# Patient Record
Sex: MALE | Race: WHITE | NOT HISPANIC OR LATINO | Employment: FULL TIME | URBAN - METROPOLITAN AREA
[De-identification: names, ages, dates, MRNs, and addresses within clinical notes are randomized per-mention and may not be internally consistent; named-entity substitution may affect disease eponyms.]

---

## 2018-06-30 ENCOUNTER — HOSPITAL ENCOUNTER (EMERGENCY)
Facility: HOSPITAL | Age: 28
Discharge: HOME | End: 2018-06-30
Attending: EMERGENCY MEDICINE
Payer: COMMERCIAL

## 2018-06-30 VITALS
HEART RATE: 93 BPM | OXYGEN SATURATION: 97 % | BODY MASS INDEX: 28.04 KG/M2 | RESPIRATION RATE: 16 BRPM | HEIGHT: 68 IN | SYSTOLIC BLOOD PRESSURE: 153 MMHG | WEIGHT: 185 LBS | DIASTOLIC BLOOD PRESSURE: 71 MMHG | TEMPERATURE: 98.4 F

## 2018-06-30 DIAGNOSIS — S39.012A ACUTE MYOFASCIAL STRAIN OF LUMBAR REGION, INITIAL ENCOUNTER: Primary | ICD-10-CM

## 2018-06-30 PROCEDURE — 99283 EMERGENCY DEPT VISIT LOW MDM: CPT

## 2018-06-30 PROCEDURE — 96372 THER/PROPH/DIAG INJ SC/IM: CPT

## 2018-06-30 RX ORDER — DIAZEPAM 5 MG/1
TABLET ORAL
Status: DISCONTINUED
Start: 2018-06-30 | End: 2018-07-01 | Stop reason: HOSPADM

## 2018-06-30 RX ORDER — DIAZEPAM 5 MG/1
5 TABLET ORAL ONCE
Status: COMPLETED | OUTPATIENT
Start: 2018-06-30 | End: 2018-06-30

## 2018-06-30 RX ORDER — CYCLOBENZAPRINE HCL 10 MG
10 TABLET ORAL 2 TIMES DAILY PRN
Qty: 20 TABLET | Refills: 0 | Status: SHIPPED | OUTPATIENT
Start: 2018-06-30 | End: 2018-12-16

## 2018-06-30 RX ORDER — IBUPROFEN 800 MG/1
800 TABLET ORAL 3 TIMES DAILY
Qty: 21 TABLET | Refills: 0 | Status: SHIPPED | OUTPATIENT
Start: 2018-06-30 | End: 2018-12-16

## 2018-06-30 RX ORDER — KETOROLAC TROMETHAMINE 30 MG/ML
60 INJECTION, SOLUTION INTRAMUSCULAR; INTRAVENOUS ONCE
Status: COMPLETED | OUTPATIENT
Start: 2018-06-30 | End: 2018-06-30

## 2018-06-30 RX ORDER — KETOROLAC TROMETHAMINE 30 MG/ML
INJECTION, SOLUTION INTRAMUSCULAR; INTRAVENOUS
Status: DISCONTINUED
Start: 2018-06-30 | End: 2018-07-01 | Stop reason: HOSPADM

## 2018-06-30 RX ADMIN — DIAZEPAM 5 MG: 5 TABLET ORAL at 21:37

## 2018-06-30 RX ADMIN — KETOROLAC TROMETHAMINE 60 MG: 30 INJECTION, SOLUTION INTRAMUSCULAR; INTRAVENOUS at 21:36
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
## 2018-07-01 NOTE — DISCHARGE INSTRUCTIONS
Low Back Strain, Ambulatory Care   GENERAL INFORMATION:   Low back strain  is an injury to your lower back muscles or tendons  Tendons are strong tissues that connect muscles to bones  The lower back supports most of your body weight and helps you move, twist, and bend  Low back strain is usually caused by activities that increase stress on the lower back, such as exercise or injury  Common symptoms include the following:   · Low back pain or muscle spasms    · Stiffness or limited movement    · Pain that goes down to the buttocks, groin, or legs    · Pain that is worse with activity  Seek immediate care for the following symptoms:   · A pop in your lower back    · Increased swelling or pain in your lower back    · Trouble moving your legs    · Numbness in your legs  Treatment for low back strain:   · NSAIDs  help decrease swelling and pain or fever  This medicine is available with or without a doctor's order  NSAIDs can cause stomach bleeding or kidney problems in certain people  If you take blood thinner medicine, always ask your healthcare provider if NSAIDs are safe for you  Always read the medicine label and follow directions  · Muscle relaxers  help decrease muscle spasms pain  · Prescription pain medicine  may be given  Ask how to take this medicine safely  Manage your symptoms:   · Rest  in bed after your injury  Slowly start to increase your activity as the pain decreases, or as directed  · Apply ice  on your lower back for 15 to 20 minutes every hour or as directed  Use an ice pack, or put crushed ice in a plastic bag  Cover it with a towel  Ice helps prevent tissue damage and decreases swelling and pain  You can alternate ice and heat  · Apply heat  on your lower back for 20 to 30 minutes every 2 hours for as many days as directed  Heat helps decrease pain and muscle spasms  Prevent another low back strain:   · Use proper body mechanics        ¨ Bend at the hips and knees when you  objects  Do not bend from the waist  Use your leg muscles as you lift the load  Do not use your back  Keep the object close to your chest as you lift it  Try not to twist or lift anything above your waist     ¨ Change your position often when you stand for long periods of time  Rest one foot on a small box or footrest, and then switch to the other foot often  ¨ Try not to sit for long periods of time  When you do, sit in a straight-backed chair with your feet flat on the floor  Never reach, pull, or push while you are sitting  · Exercise regularly  Warm up before you exercise  Do exercises that strengthen your back muscles  Ask about the best exercise plan for you  · Maintain a healthy weight  Ask your healthcare provider how much you should weigh  Ask him to help you create a weight loss plan if you are overweight  Follow up with your healthcare provider as directed:  Write down your questions so you remember to ask them during your visits  CARE AGREEMENT:   You have the right to help plan your care  Learn about your health condition and how it may be treated  Discuss treatment options with your caregivers to decide what care you want to receive  You always have the right to refuse treatment  The above information is an  only  It is not intended as medical advice for individual conditions or treatments  Talk to your doctor, nurse or pharmacist before following any medical regimen to see if it is safe and effective for you  © 2014 [address] is for End User's use only and may not be sold, redistributed or otherwise used for commercial purposes  All illustrations and images included in CareNotes® are the copyrighted property of [employer]  or Dave Sabillon

## 2018-12-16 ENCOUNTER — APPOINTMENT (EMERGENCY)
Dept: CT IMAGING | Facility: HOSPITAL | Age: 28
End: 2018-12-16
Payer: COMMERCIAL

## 2018-12-16 ENCOUNTER — HOSPITAL ENCOUNTER (EMERGENCY)
Facility: HOSPITAL | Age: 28
Discharge: HOME | End: 2018-12-16
Attending: EMERGENCY MEDICINE | Admitting: EMERGENCY MEDICINE
Payer: COMMERCIAL

## 2018-12-16 VITALS
WEIGHT: 185 LBS | HEIGHT: 68 IN | SYSTOLIC BLOOD PRESSURE: 139 MMHG | OXYGEN SATURATION: 96 % | BODY MASS INDEX: 28.04 KG/M2 | TEMPERATURE: 98 F | DIASTOLIC BLOOD PRESSURE: 90 MMHG | RESPIRATION RATE: 18 BRPM | HEART RATE: 80 BPM

## 2018-12-16 DIAGNOSIS — S01.512A LACERATION OF BUCCAL MUCOSA, INITIAL ENCOUNTER: ICD-10-CM

## 2018-12-16 DIAGNOSIS — S01.81XA FACIAL LACERATION, INITIAL ENCOUNTER: ICD-10-CM
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
## 2018-12-16 PROCEDURE — 99284 EMERGENCY DEPT VISIT MOD MDM: CPT

## 2018-12-16 PROCEDURE — 70450 CT HEAD/BRAIN W/O DYE: CPT

## 2018-12-16 RX ORDER — IBUPROFEN 400 MG/1
800 TABLET ORAL ONCE
Status: COMPLETED | OUTPATIENT
Start: 2018-12-16 | End: 2018-12-16

## 2018-12-16 RX ORDER — LIDOCAINE HYDROCHLORIDE AND EPINEPHRINE 10; 10 MG/ML; UG/ML
1 INJECTION, SOLUTION INFILTRATION; PERINEURAL ONCE
Status: COMPLETED | OUTPATIENT
Start: 2018-12-16 | End: 2018-12-16

## 2018-12-16 RX ORDER — BUPIVACAINE HYDROCHLORIDE 2.5 MG/ML
10 INJECTION, SOLUTION EPIDURAL; INFILTRATION; INTRACAUDAL ONCE
Status: COMPLETED | OUTPATIENT
Start: 2018-12-16 | End: 2018-12-16

## 2018-12-16 RX ADMIN — LIDOCAINE HYDROCHLORIDE,EPINEPHRINE BITARTRATE 1 ML: 10; .01 INJECTION, SOLUTION INFILTRATION; PERINEURAL at 15:08

## 2018-12-16 RX ADMIN — BUPIVACAINE HYDROCHLORIDE 10 ML: 2.5 INJECTION, SOLUTION EPIDURAL; INFILTRATION; INTRACAUDAL; PERINEURAL at 15:09

## 2018-12-16 RX ADMIN — IBUPROFEN 800 MG: 400 TABLET ORAL at 15:43
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
## 2018-12-16 NOTE — DISCHARGE INSTRUCTIONS
Facial Laceration   WHAT YOU NEED TO KNOW:   A facial laceration is a tear or cut in the skin caused by blunt or shearing forces, or sharp objects  Facial lacerations may be closed within 24 hours of injury  DISCHARGE INSTRUCTIONS:   Return to the emergency department if:   · You have a fever and the wound is painful, warm, or swollen  The wound area may be red, or fluid may come out of it  · You have heavy bleeding or bleeding that does not stop after 10 minutes of holding firm, direct pressure over the wound  Contact your healthcare provider if:   · Your wound reopens or your tape comes off  · Your wound is very painful  · Your wound is not healing, or you think there is an object in the wound  · The skin around your wound stays numb  · You have questions or concerns about your condition or care  Medicines:   · Antibiotics  may be given to prevent an infection if your wound was deep and had to be cleaned out  · Take your medicine as directed  Contact your healthcare provider if you think your medicine is not helping or if you have side effects  Tell him of her if you are allergic to any medicine  Keep a list of the medicines, vitamins, and herbs you take  Include the amounts, and when and why you take them  Bring the list or the pill bottles to follow-up visits  Carry your medicine list with you in case of an emergency  Care for your wound:  Care for your wound as directed to prevent infection and help it heal  Wash your hands with soap and warm water before and after you care for your wound  You may need to keep the wound dry for the first 24 to 48 hours  When your healthcare provider says it is okay, wash around your wound with soap and water, or as directed  Gently pat the area dry  Do not use alcohol or hydrogen peroxide to clean your wound unless you are directed to  · Do not take aspirin or NSAIDs for 24 hours after being injured  Aspirin and NSAIDs can increase blood flow   Your laceration may continue to bleed  · Do not take hot showers, eat or drink hot foods and liquids for 48 hours after being injured  Also, do not use a heating pad near your laceration  The heat can cause swelling in and around your laceration  · If your wound was covered with a bandage,  leave your bandage on as long as directed  Bandages keep your wound clean and protected  They can also prevent swelling  Ask when and how to change your bandage  Be careful not to apply the bandage or tape too tightly  This could cut off blood flow and cause more injury  · If your wound was closed with stitches,  keep your wound clean  Your healthcare provider may recommend that you apply antibiotic ointment after you clean your wound  · If your wound was closed with wound tape or medical strips,  keep the area clean and dry  The strips will usually fall off on their own after several days  · If your wound was closed with tissue glue,  do not use any ointments or lotions on the area  You may shower, but do not swim or soak in a bathtub  Gently pat the area dry after you take a shower  Do not pick at or scrub the glue area  Decrease scarring: The skin in the area of your wound may turn a different color if it is exposed to direct sunlight  After your wound is healed, use sunscreen over the area when you are out in the sun  You should do this for at least 6 months to 1 year after your injury  Some wounds scar less if they are covered while they heal   Follow up with your healthcare provider as directed: You may need to follow up with your healthcare provider in 24 to 48 hours to have your wound checked for infection  You may need to return in 3 to 5 days if you have stitches that need to be removed  Write down your questions so you remember to ask them during your visits    © 2017 [employer] Information is for End User's use only and may not be sold, redistributed or otherwise used for commercial purposes  All illustrations and images included in CareNotes® are the copyrighted property of [employer]  or Dave Sabillon  The above information is an  only  It is not intended as medical advice for individual conditions or treatments  Talk to your doctor, nurse or pharmacist before following any medical regimen to see if it is safe and effective for you

## 2019-12-21 ENCOUNTER — HOSPITAL ENCOUNTER (EMERGENCY)
Facility: HOSPITAL | Age: 29
Discharge: HOME | End: 2019-12-21
Attending: FAMILY MEDICINE
Payer: COMMERCIAL

## 2019-12-21 ENCOUNTER — APPOINTMENT (EMERGENCY)
Dept: RADIOLOGY | Facility: HOSPITAL | Age: 29
End: 2019-12-21
Payer: COMMERCIAL

## 2019-12-21 VITALS
DIASTOLIC BLOOD PRESSURE: 71 MMHG | SYSTOLIC BLOOD PRESSURE: 135 MMHG | BODY MASS INDEX: 27.28 KG/M2 | WEIGHT: 180 LBS | HEART RATE: 90 BPM | RESPIRATION RATE: 16 BRPM | HEIGHT: 68 IN | OXYGEN SATURATION: 99 % | TEMPERATURE: 102 F

## 2019-12-21 DIAGNOSIS — J20.9 ACUTE BRONCHITIS: Primary | ICD-10-CM

## 2019-12-21 DIAGNOSIS — J10.1 INFLUENZA B: ICD-10-CM

## 2019-12-21 LAB
ANION GAP SERPL CALCULATED.3IONS-SCNC: 7 MMOL/L (ref 4–13)
BUN SERPL-MCNC: 13 MG/DL (ref 7–25)
CALCIUM SERPL-MCNC: 9.7 MG/DL (ref 8.6–10.5)
CHLORIDE SERPL-SCNC: 99 MMOL/L (ref 98–107)
CO2 SERPL-SCNC: 31 MMOL/L (ref 21–31)
CREAT SERPL-MCNC: 1.13 MG/DL (ref 0.7–1.3)
ERYTHROCYTE [DISTWIDTH] IN BLOOD BY AUTOMATED COUNT: 13 % (ref 11.5–14.5)
FLUAV RNA NPH QL NAA+PROBE: ABNORMAL
FLUBV RNA NPH QL NAA+PROBE: DETECTED
GFR SERPL CREATININE-BSD FRML MDRD: 87 ML/MIN/1.73SQ M
GLUCOSE SERPL-MCNC: 124 MG/DL (ref 65–99)
HCT VFR BLD AUTO: 46.7 % (ref 42–47)
HGB BLD-MCNC: 16 G/DL (ref 14–18)
LACTATE SERPL-SCNC: 1.8 MMOL/L (ref 0.5–2)
MCH RBC QN AUTO: 29.2 PG (ref 26–34)
MCHC RBC AUTO-ENTMCNC: 34.3 G/DL (ref 31–37)
MCV RBC AUTO: 85 FL (ref 81–99)
PLATELET # BLD AUTO: 159 THOUSANDS/UL (ref 149–390)
PMV BLD AUTO: 7.5 FL (ref 8.6–11.7)
POTASSIUM SERPL-SCNC: 4 MMOL/L (ref 3.5–5.5)
RBC # BLD AUTO: 5.5 MILLION/UL (ref 4.3–5.9)
RSV RNA NPH QL NAA+PROBE: ABNORMAL
SODIUM SERPL-SCNC: 137 MMOL/L (ref 134–143)
WBC # BLD AUTO: 5.4 THOUSAND/UL (ref 4.8–10.8)

## 2019-12-21 PROCEDURE — 83605 ASSAY OF LACTIC ACID: CPT | Performed by: PHYSICIAN ASSISTANT

## 2019-12-21 PROCEDURE — 96361 HYDRATE IV INFUSION ADD-ON: CPT

## 2019-12-21 PROCEDURE — 87040 BLOOD CULTURE FOR BACTERIA: CPT | Performed by: PHYSICIAN ASSISTANT

## 2019-12-21 PROCEDURE — 99284 EMERGENCY DEPT VISIT MOD MDM: CPT | Performed by: PHYSICIAN ASSISTANT

## 2019-12-21 PROCEDURE — 96365 THER/PROPH/DIAG IV INF INIT: CPT

## 2019-12-21 PROCEDURE — 36415 COLL VENOUS BLD VENIPUNCTURE: CPT | Performed by: PHYSICIAN ASSISTANT

## 2019-12-21 PROCEDURE — 87631 RESP VIRUS 3-5 TARGETS: CPT | Performed by: PHYSICIAN ASSISTANT

## 2019-12-21 PROCEDURE — 99283 EMERGENCY DEPT VISIT LOW MDM: CPT

## 2019-12-21 PROCEDURE — 71046 X-RAY EXAM CHEST 2 VIEWS: CPT

## 2019-12-21 PROCEDURE — 80048 BASIC METABOLIC PNL TOTAL CA: CPT | Performed by: PHYSICIAN ASSISTANT

## 2019-12-21 RX ORDER — AMOXICILLIN AND CLAVULANATE POTASSIUM 875; 125 MG/1; MG/1
1 TABLET, FILM COATED ORAL EVERY 12 HOURS SCHEDULED
Qty: 14 TABLET | Refills: 0 | Status: SHIPPED | OUTPATIENT
Start: 2019-12-21 | End: 2019-12-28

## 2019-12-21 RX ORDER — ACETAMINOPHEN 325 MG/1
650 TABLET ORAL ONCE
Status: COMPLETED | OUTPATIENT
Start: 2019-12-21 | End: 2019-12-21

## 2019-12-21 RX ORDER — CEFTRIAXONE 1 G/50ML
1000 INJECTION, SOLUTION INTRAVENOUS ONCE
Status: COMPLETED | OUTPATIENT
Start: 2019-12-21 | End: 2019-12-21

## 2019-12-21 RX ADMIN — CEFTRIAXONE 1000 MG: 1 INJECTION, SOLUTION INTRAVENOUS at 14:55

## 2019-12-21 RX ADMIN — SODIUM CHLORIDE 1000 ML: 0.9 INJECTION, SOLUTION INTRAVENOUS at 14:27

## 2019-12-21 RX ADMIN — ACETAMINOPHEN 650 MG: 325 TABLET ORAL at 15:30

## 2019-12-21 NOTE — ED PROVIDER NOTES
History  Chief Complaint   Patient presents with    Cough     about 4 weeks, white/green mucus nyquil/ daywuil    Chills     19-year-old male presents emergency room with complaint of cough fevers and chills and sore throat times proximally 4 weeks  He states he was trying to deal with at home but has just gotten worse over the past 3-4 days and now has difficulty breathing presents to emergency room for evaluation  Patient's history of tobacco abuse quit 2 years ago  History provided by:  Patient   used: No    Cough   Cough characteristics:  Productive  Sputum characteristics:  Yellow and green  Severity:  Moderate  Onset quality:  Gradual  Duration:  4 weeks  Timing:  Constant  Progression:  Worsening  Chronicity:  New  Smoker: no    Relieved by:  None tried  Worsened by:  Nothing  Ineffective treatments:  None tried  Associated symptoms: chills, fever, rhinorrhea, shortness of breath and sore throat    Associated symptoms: no chest pain, no diaphoresis, no ear pain, no headaches, no myalgias, no rash and no wheezing    Fever:     Timing:  Intermittent    Temp source:  Subjective    Progression:  Unchanged  Rhinorrhea:     Quality:  Clear    Severity:  Moderate    Timing:  Constant    Progression:  Unchanged  Shortness of breath:     Severity:  Moderate    Onset quality:  Gradual    Timing:  Intermittent    Progression:  Worsening  Sore throat:     Severity:  Mild    Onset quality:  Gradual    Timing:  Intermittent    Progression:  Worsening  Risk factors: recent infection      No Known Allergies      None       Past Medical History:   Diagnosis Date    Asthma        Past Surgical History:   Procedure Laterality Date    APPENDECTOMY         History reviewed  No pertinent family history  I have reviewed and agree with the history as documented      Social History     Tobacco Use    Smoking status: Never Smoker    Smokeless tobacco: Never Used   Substance Use Topics    Alcohol use: No    Drug use: No        Review of Systems   Constitutional: Positive for chills and fever  Negative for diaphoresis and fatigue  HENT: Positive for rhinorrhea and sore throat  Negative for congestion, ear pain and sneezing  Respiratory: Positive for cough and shortness of breath  Negative for wheezing and stridor  Cardiovascular: Negative for chest pain, palpitations and leg swelling  Gastrointestinal: Negative for abdominal pain, constipation, diarrhea, nausea and vomiting  Genitourinary: Negative for difficulty urinating, dysuria, frequency, hematuria and urgency  Musculoskeletal: Negative for gait problem, myalgias and neck pain  Skin: Negative for rash  Neurological: Negative for dizziness, syncope, weakness, light-headedness and headaches  All other systems reviewed and are negative  Physical Exam  Physical Exam   Constitutional: He is oriented to person, place, and time  He appears well-developed and well-nourished  HENT:   Head: Normocephalic and atraumatic  Right Ear: Hearing, tympanic membrane, external ear and ear canal normal  Tympanic membrane is not erythematous  Left Ear: Hearing, tympanic membrane, external ear and ear canal normal  Tympanic membrane is not erythematous  Nose: Mucosal edema and rhinorrhea present  Mouth/Throat: Uvula is midline and mucous membranes are normal  Posterior oropharyngeal erythema present  No oropharyngeal exudate or posterior oropharyngeal edema  Eyes: Pupils are equal, round, and reactive to light  Conjunctivae and EOM are normal    Neck: Normal range of motion  Neck supple  No tracheal deviation present  Cardiovascular: Normal rate, regular rhythm, normal heart sounds and intact distal pulses  No murmur heard  Pulmonary/Chest: Effort normal and breath sounds normal  No respiratory distress  He has no wheezes  He has no rales  Abdominal: Soft  Bowel sounds are normal  He exhibits no distension and no mass   There is no tenderness  Musculoskeletal: Normal range of motion  He exhibits no edema or tenderness  Neurological: He is alert and oriented to person, place, and time  Skin: Skin is warm and dry  No rash noted  No erythema  Nursing note and vitals reviewed        Vital Signs  ED Triage Vitals   Temperature Pulse Respirations Blood Pressure SpO2   12/21/19 1333 12/21/19 1333 12/21/19 1333 12/21/19 1333 12/21/19 1333   (!) 100 6 °F (38 1 °C) 100 16 141/81 98 %      Temp Source Heart Rate Source Patient Position - Orthostatic VS BP Location FiO2 (%)   12/21/19 1333 12/21/19 1333 12/21/19 1525 12/21/19 1525 --   Temporal Monitor Sitting Left arm       Pain Score       12/21/19 1333       No Pain           Vitals:    12/21/19 1333 12/21/19 1525   BP: 141/81 135/71   Pulse: 100 92   Patient Position - Orthostatic VS:  Sitting         Visual Acuity      ED Medications  Medications   sodium chloride 0 9 % bolus 1,000 mL (1,000 mL Intravenous New Bag 12/21/19 1427)   cefTRIAXone (ROCEPHIN) IVPB (premix) 1,000 mg (0 mg Intravenous Stopped 12/21/19 1531)   acetaminophen (TYLENOL) tablet 650 mg (650 mg Oral Given 12/21/19 1530)       Diagnostic Studies  Results Reviewed     Procedure Component Value Units Date/Time    Influenza A/B and RSV PCR [[id number]]  (Abnormal) Collected:  12/21/19 1449    Lab Status:  Final result Specimen:  Nose Updated:  12/21/19 1529     INFLUENZA A PCR None Detected     INFLUENZA B PCR Detected     RSV PCR None Detected    Basic metabolic panel [[id number]]  (Abnormal) Collected:  12/21/19 1403    Lab Status:  Final result Specimen:  Blood from Arm, Right Updated:  12/21/19 1437     Sodium 137 mmol/L      Potassium 4 0 mmol/L      Chloride 99 mmol/L      CO2 31 mmol/L      ANION GAP 7 mmol/L      BUN 13 mg/dL      Creatinine 1 13 mg/dL      Glucose 124 mg/dL      Calcium 9 7 mg/dL      eGFR 87 ml/min/1 73sq m     Narrative:       [place] guidelines for Chronic Kidney Disease (CKD):   Stage 1 with normal or high GFR (GFR > 90 mL/min/1 73 square meters)    Stage 2 Mild CKD (GFR = 60-89 mL/min/1 73 square meters)    Stage 3A Moderate CKD (GFR = 45-59 mL/min/1 73 square meters)    Stage 3B Moderate CKD (GFR = 30-44 mL/min/1 73 square meters)    Stage 4 Severe CKD (GFR = 15-29 mL/min/1 73 square meters)    Stage 5 End Stage CKD (GFR <15 mL/min/1 73 square meters)  Note: GFR calculation is accurate only with a steady state creatinine    Lactic acid, plasma [[id number]]  (Normal) Collected:  12/21/19 1403    Lab Status:  Final result Specimen:  Blood from Arm, Right Updated:  12/21/19 1437     LACTIC ACID 1 8 mmol/L     Narrative:       Result may be elevated if tourniquet was used during collection  CBC and differential [[id number]]  (Abnormal) Collected:  12/21/19 1403    Lab Status:  Final result Specimen:  Blood from Arm, Right Updated:  12/21/19 1427     WBC 5 40 Thousand/uL      RBC 5 50 Million/uL      Hemoglobin 16 0 g/dL      Hematocrit 46 7 %      MCV 85 fL      MCH 29 2 pg      MCHC 34 3 g/dL      RDW 13 0 %      MPV 7 5 fL      Platelets 691 Thousands/uL     Blood culture #1 [[id number]] Collected:  12/21/19 1419    Lab Status: In process Specimen:  Blood from Arm, Left Updated:  12/21/19 1422    Blood culture #2 [[id number]] Collected:  12/21/19 1409    Lab Status: In process Specimen:  Blood from Arm, Right Updated:  12/21/19 1413                 XR chest 2 views   Final Result by Jaja Salter MD (12/21 1526)      No acute cardiopulmonary disease        Workstation performed: [id number]                    Procedures  Procedures         ED Course                               MDM  Number of Diagnoses or Management Options  Acute bronchitis: new and requires workup  Influenza B: new and requires workup     Amount and/or Complexity of Data Reviewed  Clinical lab tests: ordered and reviewed  Tests in the radiology section of CPT®: ordered and reviewed    Risk of Complications, Morbidity, and/or Mortality  Presenting problems: moderate  Diagnostic procedures: moderate  Management options: moderate  General comments: 26-year-old male with acute bronchitis and influenza B infection  O2 saturation remained stable while in emergency room symptomatic treatment of fevers body aches and antibiotic for associated bronchitis times several weeks with additional influenza new diagnosis  Patient is out of the window for Tamiflu will continue recommends conservative management return to ER for acute worsening symptoms anticipatory guidance given stable for discharge home today  Patient Progress  Patient progress: stable        Disposition  Final diagnoses:   Acute bronchitis   Influenza B     Time reflects when diagnosis was documented in both MDM as applicable and the Disposition within this note     Time User Action Codes Description Comment    12/21/2019  3:16 PM Billey Sane Add [J20 9] Acute bronchitis     12/21/2019  3:29 PM Billey Sane Add [A41 3,  R65 20] Severe sepsis with acute organ dysfunction due to Haemophilus influenzae ([facility] [address] )     12/21/2019  3:29 PM Billey Sane Remove [A41 3,  R65 20] Severe sepsis with acute organ dysfunction [facility] to [facility]ae [facility]     12/21/2019  3:29 PM Billey Sane Add [J10 1] Influenza B       ED Disposition     ED Disposition Condition Date/Time Comment    Discharge Stable Sat Dec 21, 2019  3:28 PM Daphene Patches discharge to home/self care  Follow-up Information     Follow up With Specialties Details Why Contact Info    PCP  Schedule an appointment as soon as possible for a visit in 1 week If symptoms worsen return to ER             Patient's Medications   Discharge Prescriptions    AMOXICILLIN-CLAVULANATE (AUGMENTIN) 875-125 MG PER TABLET    Take 1 tablet by mouth every 12 (twelve) hours for 7 days       Start Date: 12/21/2019End Date: 12/28/2019       Order Dose: 1 tablet       Quantity: 14 tablet    Refills: 0     No discharge procedures on file      ED Provider  Electronically Signed by           Scott Amos PA-C  12/21/19 2663

## 2019-12-21 NOTE — DISCHARGE INSTRUCTIONS
REST, DRINK PLENTY OF WATER, TYLENOL OR IBUPROFEN FOR FEVER AND BODY ACHES, STICK TO HEALTHY BLAND DIET  AVOID DAIRY WHILE SICK

## 2019-12-23 ENCOUNTER — HOSPITAL ENCOUNTER (EMERGENCY)
Facility: HOSPITAL | Age: 29
Discharge: HOME | End: 2019-12-23
Attending: EMERGENCY MEDICINE | Admitting: EMERGENCY MEDICINE
Payer: COMMERCIAL

## 2019-12-23 VITALS
DIASTOLIC BLOOD PRESSURE: 82 MMHG | TEMPERATURE: 98 F | SYSTOLIC BLOOD PRESSURE: 157 MMHG | WEIGHT: 180 LBS | OXYGEN SATURATION: 98 % | RESPIRATION RATE: 18 BRPM | HEART RATE: 81 BPM | HEIGHT: 68 IN | BODY MASS INDEX: 27.28 KG/M2

## 2019-12-23 DIAGNOSIS — R51.9 HEADACHE: Primary | ICD-10-CM

## 2019-12-23 PROCEDURE — 99284 EMERGENCY DEPT VISIT MOD MDM: CPT | Performed by: EMERGENCY MEDICINE

## 2019-12-23 PROCEDURE — 96361 HYDRATE IV INFUSION ADD-ON: CPT

## 2019-12-23 PROCEDURE — 96375 TX/PRO/DX INJ NEW DRUG ADDON: CPT

## 2019-12-23 PROCEDURE — 99283 EMERGENCY DEPT VISIT LOW MDM: CPT

## 2019-12-23 PROCEDURE — 96365 THER/PROPH/DIAG IV INF INIT: CPT

## 2019-12-23 RX ORDER — KETOROLAC TROMETHAMINE 30 MG/ML
15 INJECTION, SOLUTION INTRAMUSCULAR; INTRAVENOUS ONCE
Status: COMPLETED | OUTPATIENT
Start: 2019-12-23 | End: 2019-12-23

## 2019-12-23 RX ORDER — IBUPROFEN 600 MG/1
600 TABLET ORAL EVERY 6 HOURS PRN
Qty: 30 TABLET | Refills: 0 | Status: SHIPPED | OUTPATIENT
Start: 2019-12-23 | End: 2021-05-05

## 2019-12-23 RX ORDER — MAGNESIUM SULFATE 1 G/100ML
1 INJECTION INTRAVENOUS ONCE
Status: COMPLETED | OUTPATIENT
Start: 2019-12-23 | End: 2019-12-23

## 2019-12-23 RX ORDER — ONDANSETRON 2 MG/ML
4 INJECTION INTRAMUSCULAR; INTRAVENOUS ONCE
Status: COMPLETED | OUTPATIENT
Start: 2019-12-23 | End: 2019-12-23

## 2019-12-23 RX ADMIN — MAGNESIUM SULFATE HEPTAHYDRATE 1 G: 1 INJECTION, SOLUTION INTRAVENOUS at 11:55

## 2019-12-23 RX ADMIN — SODIUM CHLORIDE 1000 ML: 0.9 INJECTION, SOLUTION INTRAVENOUS at 11:50

## 2019-12-23 RX ADMIN — ONDANSETRON 4 MG: 2 INJECTION INTRAMUSCULAR; INTRAVENOUS at 11:52

## 2019-12-23 RX ADMIN — KETOROLAC TROMETHAMINE 15 MG: 30 INJECTION, SOLUTION INTRAMUSCULAR; INTRAVENOUS at 11:52

## 2019-12-23 NOTE — ED PROVIDER NOTES
History  Chief Complaint   Patient presents with    Headache     dx with flu B last week  has had headache since     69-year-old male past medical history of asthma who was recently diagnosed with influenza B and acute bronchitis on 12/21 presents to the emergency department for evaluation of a headache that has been present since his recent discharge from the emergency department  Patient states the headache has been unchanged since his diagnosis of the flu  Patient states the headache is throbbing sensation retrobulbar bilaterally with radiation toward the crown  Eleven seems alleviate headache that seems to exacerbate headache  Patient has been taking 5 mg q 6hours and Tylenol as well as amoxicillin prescribed    Patient has associated watery eyes as well as photophobia  Patient has been able to tolerate p o , however, has had decreased oral intake for the past few days    Patient denies fever, nuchal rigidity, visual changes, nausea, vomiting, diarrhea, abdominal pain, dysuria, hematuria          Prior to Admission Medications   Prescriptions Last Dose Informant Patient Reported? Taking?   amoxicillin-clavulanate (AUGMENTIN) 875-125 mg per tablet   No No   Sig: Take 1 tablet by mouth every 12 (twelve) hours for 7 days      Facility-Administered Medications: None       Past Medical History:   Diagnosis Date    Asthma        Past Surgical History:   Procedure Laterality Date    APPENDECTOMY         History reviewed  No pertinent family history  I have reviewed and agree with the history as documented  Social History     Tobacco Use    Smoking status: Never Smoker    Smokeless tobacco: Never Used   Substance Use Topics    Alcohol use: No    Drug use: No        Review of Systems   Constitutional: Negative for chills, diaphoresis, fatigue and fever     HENT: Negative for congestion, ear discharge, facial swelling, hearing loss, rhinorrhea, sinus pressure, sinus pain, sneezing, sore throat, tinnitus and trouble swallowing  Eyes: Negative for pain, discharge and redness  Respiratory: Negative for cough, choking, chest tightness, shortness of breath, wheezing and stridor  Cardiovascular: Negative for chest pain, palpitations and leg swelling  Gastrointestinal: Negative for abdominal distention, abdominal pain, blood in stool, constipation, diarrhea, nausea and vomiting  Endocrine: Negative for cold intolerance, polydipsia and polyuria  Genitourinary: Negative for difficulty urinating, dysuria, enuresis, flank pain, frequency and hematuria  Musculoskeletal: Negative for arthralgias, back pain, gait problem and neck stiffness  Skin: Negative for rash and wound  Neurological: Positive for headaches  Negative for dizziness, seizures, syncope, weakness and numbness  Hematological: Negative for adenopathy  Psychiatric/Behavioral: Negative for agitation, confusion, hallucinations, sleep disturbance and suicidal ideas  All other systems reviewed and are negative  Physical Exam  Physical Exam   Constitutional: He is oriented to person, place, and time  He appears well-developed and well-nourished  No distress  HENT:   Head: Normocephalic and atraumatic  Eyes: Pupils are equal, round, and reactive to light  Conjunctivae and EOM are normal    Neck: Normal range of motion  Cardiovascular: Normal rate and regular rhythm  Exam reveals no gallop and no friction rub  No murmur heard  Pulmonary/Chest: Breath sounds normal  No respiratory distress  He has no wheezes  He has no rales  Abdominal: Soft  Normal appearance and bowel sounds are normal  There is no tenderness  There is no rigidity, no rebound, no guarding, no CVA tenderness, no tenderness at McBurney's point and negative Peraza's sign  Neurological: He is alert and oriented to person, place, and time  No cranial nerve deficit or sensory deficit  GCS eye subscore is 4  GCS verbal subscore is 5  GCS motor subscore is 6     Reflex Scores:       Bicep reflexes are 2+ on the right side and 2+ on the left side  Patellar reflexes are 2+ on the right side and 2+ on the left side  Patient has equal 5/5 strength b/l UE and Le  No focal neuro deficits noted  Skin: Skin is warm and dry  Capillary refill takes less than 2 seconds  He is not diaphoretic  Psychiatric: He has a normal mood and affect  His behavior is normal  Judgment and thought content normal    Nursing note and vitals reviewed        Vital Signs  ED Triage Vitals [12/23/19 1122]   Temperature Pulse Respirations Blood Pressure SpO2   98 °F (36 7 °C) 81 18 157/82 98 %      Temp Source Heart Rate Source Patient Position - Orthostatic VS BP Location FiO2 (%)   Temporal Monitor Sitting Left arm --      Pain Score       Worst Possible Pain           Vitals:    12/23/19 1122   BP: 157/82   Pulse: 81   Patient Position - Orthostatic VS: Sitting         Visual Acuity      ED Medications  Medications   ondansetron (ZOFRAN) injection 4 mg (4 mg Intravenous Given 12/23/19 1152)   ketorolac (TORADOL) injection 15 mg (15 mg Intravenous Given 12/23/19 1152)   sodium chloride 0 9 % bolus 1,000 mL (1,000 mL Intravenous New Bag 12/23/19 1150)   magnesium sulfate IVPB (premix) SOLN 1 g (0 g Intravenous Stopped 12/23/19 1235)       Diagnostic Studies  Results Reviewed     None                 No orders to display              Procedures  Procedures         ED Course  ED Course as of Dec 23 1310   Mon Dec 23, 2019   1254 Symptoms have resolved                                  MDM  Number of Diagnoses or Management Options  Headache: new and requires workup  Diagnosis management comments: No nuchal rigidity, lungs are clear likely dehydration versus headache associated with influenza    Patient given Toradol 50 mg IV, 1 L normal saline, Zofran, magnesium 1 g     1  Headache  -symptoms completely resolved  -PCP follow-up  -ED as needed or few developed neck stiffness, worsening headache, nausea, vomiting, fever   -continue to encourage p o  Hydration as well as oral diet        Disposition  Final diagnoses:   Headache     Time reflects when diagnosis was documented in both MDM as applicable and the Disposition within this note     Time User Action Codes Description Comment    12/23/2019  1:08 PM Carlos Beckwithmary Add [R51] Headache       ED Disposition     ED Disposition Condition Date/Time Comment    Discharge Stable Mon Dec 23, 2019  1:08 PM Elisa Coley discharge to home/self care  Follow-up Information     Follow up With Specialties Details Why Contact Info    [employer]  Call in 1 day For establishment of primary care [phone]      [address]  Emergency Department Emergency Medicine Go to  If symptoms worsen, As needed, or if you developed neck stiffness/pain or worsening headache [address] [place] [id number] [phone]          Patient's Medications   Discharge Prescriptions    IBUPROFEN (MOTRIN) 600 MG TABLET    Take 1 tablet (600 mg total) by mouth every 6 (six) hours as needed for mild pain       Start Date: 12/23/2019End Date: --       Order Dose: 600 mg       Quantity: 30 tablet    Refills: 0     No discharge procedures on file      ED Provider  Electronically Signed by           Jerald Craig DO  12/23/19 2163

## 2019-12-26 LAB
BACTERIA BLD CULT: NORMAL
BACTERIA BLD CULT: NORMAL

## 2020-06-29 ENCOUNTER — APPOINTMENT (OUTPATIENT)
Dept: RADIOLOGY | Facility: CLINIC | Age: 30
End: 2020-06-29
Payer: COMMERCIAL

## 2020-06-29 ENCOUNTER — TRANSCRIBE ORDERS (OUTPATIENT)
Dept: RADIOLOGY | Facility: CLINIC | Age: 30
End: 2020-06-29

## 2020-06-29 DIAGNOSIS — G47.00 INSOMNIA, UNSPECIFIED TYPE: ICD-10-CM

## 2020-06-29 DIAGNOSIS — R52 PAIN: Primary | ICD-10-CM

## 2020-06-29 DIAGNOSIS — F41.1 GAD (GENERALIZED ANXIETY DISORDER): ICD-10-CM

## 2020-06-29 DIAGNOSIS — F11.21 OPIOID DEPENDENCE IN REMISSION (HCC): ICD-10-CM

## 2020-06-29 DIAGNOSIS — F32.A DEPRESSION, UNSPECIFIED DEPRESSION TYPE: ICD-10-CM

## 2020-06-29 DIAGNOSIS — F11.10 OPIOID ABUSE (HCC): ICD-10-CM

## 2020-06-29 DIAGNOSIS — R52 PAIN: ICD-10-CM

## 2020-06-29 PROCEDURE — 73562 X-RAY EXAM OF KNEE 3: CPT

## 2020-06-29 PROCEDURE — 72110 X-RAY EXAM L-2 SPINE 4/>VWS: CPT

## 2020-08-18 ENCOUNTER — HOSPITAL ENCOUNTER (EMERGENCY)
Facility: HOSPITAL | Age: 30
Discharge: HOME | End: 2020-08-18
Attending: EMERGENCY MEDICINE
Payer: COMMERCIAL

## 2020-08-18 ENCOUNTER — APPOINTMENT (EMERGENCY)
Dept: RADIOLOGY | Facility: HOSPITAL | Age: 30
End: 2020-08-18
Payer: COMMERCIAL

## 2020-08-18 VITALS
OXYGEN SATURATION: 95 % | SYSTOLIC BLOOD PRESSURE: 139 MMHG | WEIGHT: 198.41 LBS | HEART RATE: 96 BPM | BODY MASS INDEX: 30.17 KG/M2 | TEMPERATURE: 98.4 F | DIASTOLIC BLOOD PRESSURE: 67 MMHG | RESPIRATION RATE: 21 BRPM

## 2020-08-18 DIAGNOSIS — T78.40XA ALLERGIC REACTION, INITIAL ENCOUNTER: Primary | ICD-10-CM

## 2020-08-18 LAB
ALBUMIN SERPL BCP-MCNC: 4.5 G/DL (ref 3.5–5.7)
ALP SERPL-CCNC: 48 U/L (ref 40–150)
ALT SERPL W P-5'-P-CCNC: 35 U/L (ref 7–52)
ANION GAP SERPL CALCULATED.3IONS-SCNC: 6 MMOL/L (ref 4–13)
AST SERPL W P-5'-P-CCNC: 21 U/L (ref 13–39)
BASOPHILS # BLD AUTO: 0.1 THOUSANDS/ΜL (ref 0–0.1)
BASOPHILS NFR BLD AUTO: 1 % (ref 0–2)
BILIRUB SERPL-MCNC: 0.9 MG/DL (ref 0.2–1)
BUN SERPL-MCNC: 14 MG/DL (ref 7–25)
CALCIUM SERPL-MCNC: 9.9 MG/DL (ref 8.6–10.5)
CHLORIDE SERPL-SCNC: 101 MMOL/L (ref 98–107)
CO2 SERPL-SCNC: 29 MMOL/L (ref 21–31)
CREAT SERPL-MCNC: 1.24 MG/DL (ref 0.7–1.3)
EOSINOPHIL # BLD AUTO: 0.1 THOUSAND/ΜL (ref 0–0.61)
EOSINOPHIL NFR BLD AUTO: 1 % (ref 0–5)
ERYTHROCYTE [DISTWIDTH] IN BLOOD BY AUTOMATED COUNT: 16.4 % (ref 11.5–14.5)
GFR SERPL CREATININE-BSD FRML MDRD: 77 ML/MIN/1.73SQ M
GLUCOSE SERPL-MCNC: 145 MG/DL (ref 65–99)
HCT VFR BLD AUTO: 54.8 % (ref 42–47)
HGB BLD-MCNC: 18.4 G/DL (ref 14–18)
LYMPHOCYTES # BLD AUTO: 2.5 THOUSANDS/ΜL (ref 0.6–4.47)
LYMPHOCYTES NFR BLD AUTO: 25 % (ref 21–51)
MCH RBC QN AUTO: 29 PG (ref 26–34)
MCHC RBC AUTO-ENTMCNC: 33.7 G/DL (ref 31–37)
MCV RBC AUTO: 86 FL (ref 81–99)
MONOCYTES # BLD AUTO: 0.5 THOUSAND/ΜL (ref 0.17–1.22)
MONOCYTES NFR BLD AUTO: 5 % (ref 2–12)
NEUTROPHILS # BLD AUTO: 7.1 THOUSANDS/ΜL (ref 1.4–6.5)
NEUTS SEG NFR BLD AUTO: 69 % (ref 42–75)
PLATELET # BLD AUTO: 351 THOUSANDS/UL (ref 149–390)
PMV BLD AUTO: 7.4 FL (ref 8.6–11.7)
POTASSIUM SERPL-SCNC: 4.8 MMOL/L (ref 3.5–5.5)
PROT SERPL-MCNC: 6.9 G/DL (ref 6.4–8.9)
RBC # BLD AUTO: 6.37 MILLION/UL (ref 4.3–5.9)
SODIUM SERPL-SCNC: 136 MMOL/L (ref 134–143)
TROPONIN I SERPL-MCNC: <0.03 NG/ML
TROPONIN I SERPL-MCNC: <0.03 NG/ML
WBC # BLD AUTO: 10.2 THOUSAND/UL (ref 4.8–10.8)

## 2020-08-18 PROCEDURE — 85025 COMPLETE CBC W/AUTO DIFF WBC: CPT | Performed by: EMERGENCY MEDICINE

## 2020-08-18 PROCEDURE — 99285 EMERGENCY DEPT VISIT HI MDM: CPT | Performed by: EMERGENCY MEDICINE

## 2020-08-18 PROCEDURE — 84484 ASSAY OF TROPONIN QUANT: CPT | Performed by: EMERGENCY MEDICINE

## 2020-08-18 PROCEDURE — 99284 EMERGENCY DEPT VISIT MOD MDM: CPT

## 2020-08-18 PROCEDURE — 93005 ELECTROCARDIOGRAM TRACING: CPT

## 2020-08-18 PROCEDURE — 96361 HYDRATE IV INFUSION ADD-ON: CPT

## 2020-08-18 PROCEDURE — 80053 COMPREHEN METABOLIC PANEL: CPT | Performed by: EMERGENCY MEDICINE

## 2020-08-18 PROCEDURE — 71045 X-RAY EXAM CHEST 1 VIEW: CPT

## 2020-08-18 PROCEDURE — 36415 COLL VENOUS BLD VENIPUNCTURE: CPT | Performed by: EMERGENCY MEDICINE

## 2020-08-18 PROCEDURE — 80175 DRUG SCREEN QUAN LAMOTRIGINE: CPT | Performed by: EMERGENCY MEDICINE

## 2020-08-18 PROCEDURE — 96375 TX/PRO/DX INJ NEW DRUG ADDON: CPT

## 2020-08-18 PROCEDURE — 96374 THER/PROPH/DIAG INJ IV PUSH: CPT

## 2020-08-18 RX ORDER — CETIRIZINE HYDROCHLORIDE 5 MG/1
10 TABLET, CHEWABLE ORAL DAILY
Qty: 10 TABLET | Refills: 0 | Status: SHIPPED | OUTPATIENT
Start: 2020-08-18 | End: 2021-05-05

## 2020-08-18 RX ORDER — METHYLPREDNISOLONE SODIUM SUCCINATE 125 MG/2ML
125 INJECTION, POWDER, LYOPHILIZED, FOR SOLUTION INTRAMUSCULAR; INTRAVENOUS ONCE
Status: COMPLETED | OUTPATIENT
Start: 2020-08-18 | End: 2020-08-18

## 2020-08-18 RX ORDER — PREDNISONE 20 MG/1
50 TABLET ORAL DAILY
Qty: 13 TABLET | Refills: 0 | Status: SHIPPED | OUTPATIENT
Start: 2020-08-18 | End: 2020-08-23

## 2020-08-18 RX ORDER — ARIPIPRAZOLE 2 MG/1
2 TABLET ORAL DAILY
COMMUNITY
Start: 2020-06-10 | End: 2021-05-05

## 2020-08-18 RX ORDER — LAMOTRIGINE 50 MG/1
1 TABLET, EXTENDED RELEASE ORAL EVERY EVENING
COMMUNITY
Start: 2020-05-19 | End: 2021-05-05

## 2020-08-18 RX ORDER — PROPRANOLOL HYDROCHLORIDE 40 MG/1
40 TABLET ORAL 3 TIMES DAILY
COMMUNITY
Start: 2020-06-09 | End: 2021-05-05

## 2020-08-18 RX ORDER — DIPHENHYDRAMINE HYDROCHLORIDE 50 MG/ML
25 INJECTION INTRAMUSCULAR; INTRAVENOUS ONCE
Status: COMPLETED | OUTPATIENT
Start: 2020-08-18 | End: 2020-08-18

## 2020-08-18 RX ORDER — FAMOTIDINE 20 MG/1
20 TABLET, FILM COATED ORAL 2 TIMES DAILY
Qty: 10 TABLET | Refills: 0 | Status: SHIPPED | OUTPATIENT
Start: 2020-08-18 | End: 2021-05-05

## 2020-08-18 RX ADMIN — DIPHENHYDRAMINE HYDROCHLORIDE 25 MG: 50 INJECTION INTRAMUSCULAR; INTRAVENOUS at 10:59

## 2020-08-18 RX ADMIN — FAMOTIDINE 20 MG: 10 INJECTION, SOLUTION INTRAVENOUS at 11:01

## 2020-08-18 RX ADMIN — SODIUM CHLORIDE 1000 ML: 0.9 INJECTION, SOLUTION INTRAVENOUS at 11:05

## 2020-08-18 RX ADMIN — METHYLPREDNISOLONE SODIUM SUCCINATE 125 MG: 125 INJECTION, POWDER, FOR SOLUTION INTRAMUSCULAR; INTRAVENOUS at 11:03

## 2020-08-18 NOTE — ED PROVIDER NOTES
History  Chief Complaint   Patient presents with    Allergic Reaction     HPI    31 y o  M w/ hx of depression on Lamictal presenting to the ED for eval of possible allergic reaction  Patient had worked out and taken Clomid (an estrogen blocking medication) when he felt goose bumps throughout and chest tightness  Patient had never taken this medication before  Patient has also been taking testosterone patches from the Internet for the past one month  He has not had these symptoms before  No known allergies  Currently feels better, denies chest pain  No abd pain, no nausea or vomiting  No tongue or lip swelling, otherwise feels like his normal self  Prior to Admission Medications   Prescriptions Last Dose Informant Patient Reported? Taking? ARIPiprazole (ABILIFY) 2 mg tablet   Yes No   Sig: Take 2 mg by mouth daily   ibuprofen (MOTRIN) 600 mg tablet   No No   Sig: Take 1 tablet (600 mg total) by mouth every 6 (six) hours as needed for mild pain   lamoTRIgine ER 50 MG TB24   Yes No   Sig: Take 1 tablet by mouth every evening   propranolol (INDERAL) 40 mg tablet   Yes No   Sig: Take 40 mg by mouth 3 (three) times a day      Facility-Administered Medications: None       Past Medical History:   Diagnosis Date    Asthma     Depressed        Past Surgical History:   Procedure Laterality Date    APPENDECTOMY         History reviewed  No pertinent family history  I have reviewed and agree with the history as documented  E-Cigarette/Vaping    E-Cigarette Use Never User      E-Cigarette/Vaping Substances     Social History     Tobacco Use    Smoking status: Never Smoker    Smokeless tobacco: Never Used   Substance Use Topics    Alcohol use: No    Drug use: No       Review of Systems   Constitutional: Negative for chills, fatigue and fever  HENT: Negative for nosebleeds and sore throat  Eyes: Negative for redness and visual disturbance  Respiratory: Positive for chest tightness   Negative for shortness of breath and wheezing  Cardiovascular: Negative for chest pain and palpitations  Gastrointestinal: Negative for abdominal pain and diarrhea  Endocrine: Negative for polyuria  Genitourinary: Negative for difficulty urinating and testicular pain  Musculoskeletal: Negative for back pain and neck stiffness  Skin: Positive for color change  Negative for rash and wound  Neurological: Negative for seizures, speech difficulty and headaches  Psychiatric/Behavioral: Negative for dysphoric mood and hallucinations  All other systems reviewed and are negative  Physical Exam  Physical Exam  Vitals signs and nursing note reviewed  Constitutional:       Appearance: He is well-developed  HENT:      Head: Normocephalic and atraumatic  Right Ear: External ear normal       Left Ear: External ear normal    Eyes:      Conjunctiva/sclera: Conjunctivae normal    Neck:      Musculoskeletal: Normal range of motion  Cardiovascular:      Rate and Rhythm: Normal rate and regular rhythm  Heart sounds: Normal heart sounds  Pulmonary:      Effort: Pulmonary effort is normal       Breath sounds: Normal breath sounds  No wheezing  Chest:      Chest wall: No tenderness  Abdominal:      General: Bowel sounds are normal       Palpations: Abdomen is soft  Tenderness: There is no abdominal tenderness  There is no guarding  Musculoskeletal: Normal range of motion  Skin:     General: Skin is warm and dry  Findings: No rash  Comments: Patches of erythema over chest, without urticaria   Neurological:      Mental Status: He is alert and oriented to person, place, and time  Cranial Nerves: No cranial nerve deficit  Sensory: No sensory deficit  Motor: No abnormal muscle tone        Coordination: Coordination normal          Vital Signs  ED Triage Vitals [08/18/20 1043]   Temperature Pulse Respirations Blood Pressure SpO2   98 4 °F (36 9 °C) 100 18 146/81 98 %      Temp Source Heart Rate Source Patient Position - Orthostatic VS BP Location FiO2 (%)   Tympanic Monitor Sitting Left arm --      Pain Score       --           Vitals:    08/18/20 1200 08/18/20 1230 08/18/20 1300 08/18/20 1330   BP: 145/84 133/83 139/84 139/67   Pulse: 92 93 92 96   Patient Position - Orthostatic VS:             Visual Acuity      ED Medications  Medications   sodium chloride 0 9 % bolus 1,000 mL (0 mL Intravenous Stopped 8/18/20 1348)   diphenhydrAMINE (BENADRYL) injection 25 mg (25 mg Intravenous Given 8/18/20 1059)   famotidine (PEPCID) injection 20 mg (20 mg Intravenous Given 8/18/20 1101)   methylPREDNISolone sodium succinate (Solu-MEDROL) injection 125 mg (125 mg Intravenous Given 8/18/20 1103)       Diagnostic Studies  Results Reviewed     Procedure Component Value Units Date/Time    Troponin I [[id number]]  (Normal) Collected:  08/18/20 1256    Lab Status:  Final result Specimen:  Blood from Arm, Left Updated:  08/18/20 1326     Troponin I <0 03 ng/mL     Troponin I [[id number]]  (Normal) Collected:  08/18/20 1051    Lab Status:  Final result Specimen:  Blood from Arm, Left Updated:  08/18/20 1117     Troponin I <0 03 ng/mL     Comprehensive metabolic panel [[id number]]  (Abnormal) Collected:  08/18/20 1054    Lab Status:  Final result Specimen:  Blood Updated:  08/18/20 1115     Sodium 136 mmol/L      Potassium 4 8 mmol/L      Chloride 101 mmol/L      CO2 29 mmol/L      ANION GAP 6 mmol/L      BUN 14 mg/dL      Creatinine 1 24 mg/dL      Glucose 145 mg/dL      Calcium 9 9 mg/dL      AST 21 U/L      ALT 35 U/L      Alkaline Phosphatase 48 U/L      Total Protein 6 9 g/dL      Albumin 4 5 g/dL      Total Bilirubin 0 90 mg/dL      eGFR 77 ml/min/1 73sq m     Narrative:       [facility]con guidelines for Chronic Kidney Disease (CKD):     Stage 1 with normal or high GFR (GFR > 90 mL/min/1 73 square meters)    Stage 2 Mild CKD (GFR = 60-89 mL/min/1 73 square meters)    Stage 3A Moderate CKD (GFR = 45-59 mL/min/1 73 square meters)    Stage 3B Moderate CKD (GFR = 30-44 mL/min/1 73 square meters)    Stage 4 Severe CKD (GFR = 15-29 mL/min/1 73 square meters)    Stage 5 End Stage CKD (GFR <15 mL/min/1 73 square meters)  Note: GFR calculation is accurate only with a steady state creatinine    CBC and differential [[id number]]  (Abnormal) Collected:  08/18/20 1051    Lab Status:  Final result Specimen:  Blood from Arm, Left Updated:  08/18/20 1103     WBC 10 20 Thousand/uL      RBC 6 37 Million/uL      Hemoglobin 18 4 g/dL      Hematocrit 54 8 %      MCV 86 fL      MCH 29 0 pg      MCHC 33 7 g/dL      RDW 16 4 %      MPV 7 4 fL      Platelets 046 Thousands/uL      Neutrophils Relative 69 %      Lymphocytes Relative 25 %      Monocytes Relative 5 %      Eosinophils Relative 1 %      Basophils Relative 1 %      Neutrophils Absolute 7 10 Thousands/µL      Lymphocytes Absolute 2 50 Thousands/µL      Monocytes Absolute 0 50 Thousand/µL      Eosinophils Absolute 0 10 Thousand/µL      Basophils Absolute 0 10 Thousands/µL     Lamotrigine level [[id number]] Collected:  08/18/20 1057    Lab Status: In process Specimen:  Blood from Arm, Left Updated:  08/18/20 1100                 XR chest 1 view portable   Final Result by Roda Meckel, MD (08/18 1210)      No acute cardiopulmonary disease  Workstation performed: [id number]                    Procedures  Procedures         ED Course  ED Course as of Aug 18 1741   Tue Aug 18, 2020   1125 ECG 12 Lead Documentation  Date/Time: today/date: 8/18/2020  Performed by: Gill Ogden  Authorized by:  Gill Ogden    ECG reviewed by me, the ED Provider: yes    Patient location:  ED   Previous ECG: If available: no  Rate:  ECG rate assessment: normal    Rhythm: sinus rhythm    Ectopy: none    QRS axis:  Normal  Intervals: normal   Q waves: None   ST segments:  No acute ST changes  T waves: normal      Impression: Normal Sinus EKG          1310 ECG 12 Lead Documentation  Date/Time: today/date: 8/18/2020  Performed by: Krzysztof Cook  Authorized by: Krzysztof Cook    ECG reviewed by me, the ED Provider: yes    Patient location:  ED   Previous ECG: If available: nsr  Rate:  ECG rate assessment: 101    Rhythm: sinus rhythm    Ectopy: none    QRS axis:  Normal  Intervals: normal   Q waves: None   ST segments:  No acute ST changes  T waves: normal      Impression: Sinus tach EKG              US AUDIT      Most Recent Value   Initial Alcohol Screen: US AUDIT-C    1  How often do you have a drink containing alcohol?  0 Filed at: 08/18/2020 1044   2  How many drinks containing alcohol do you have on a typical day you are drinking? 0 Filed at: 08/18/2020 1044   3a  Male UNDER 65: How often do you have five or more drinks on one occasion? 0 Filed at: 08/18/2020 1044   3b  FEMALE Any Age, or MALE 65+: How often do you have 4 or more drinks on one occassion? 0 Filed at: 08/18/2020 1044   Audit-C Score  0 Filed at: 08/18/2020 1044            HEART Risk Score      Most Recent Value   Heart Score Risk Calculator   History  0 Filed at: 08/18/2020 1220   ECG  0 Filed at: 08/18/2020 1220   Age  0 Filed at: 08/18/2020 1220   Risk Factors  0 Filed at: 08/18/2020 1220   Troponin  0 Filed at: 08/18/2020 1220   HEART Score  0 Filed at: 08/18/2020 1220            OSIRIS/DAST-10      Most Recent Value   How many times in the past year have you    Used an illegal drug or used a prescription medication for non-medical reasons? Never Filed at: 08/18/2020 1044                [facility]  77-year-old male who presents to the ER for evaluation of possible allergic reaction in the setting of using estrogen blocking medication Clovid  Patient has had improvement of his symptoms, will do a cardiac workup, given risk of hormone usage and ACS  Will also treat with H1 H2 blocker and steroids  Only 1 system involvement, will defer appy at this time  Patient had negative troponin x2, EKG no acute changes  Patient will continue allergic medications, no continued reaction a recurrence during his observation stay in the ER  Was discharged home with primary care follow-up  The patient was instructed to follow up as documented  Strict return precautions were discussed with the patient and the patient was instructed to return to the emergency department immediately if symptoms worsen  The patient/patient family member acknowledged and were in agreement with plan  Disposition  Final diagnoses: Allergic reaction, initial encounter     Time reflects when diagnosis was documented in both MDM as applicable and the Disposition within this note     Time User Action Codes Description Comment    8/18/2020  1:41 PM Peg Wright Allergic reaction, initial encounter       ED Disposition     ED Disposition Condition Date/Time Comment    Discharge Stable Tue Aug 18, 2020  1:41 PM Sheryle Latina discharge to home/self care              Follow-up Information     Follow up With Specialties Details Why Contact Info Additional Information    [facility] Primary Care Family Medicine Schedule an appointment as soon as possible for a visit in 5 days For follow up regarding your symptoms and recheck [address] [id number]  [facility], [address], [address]     [facility]  Call  To find a primary care doctor [address] [facility]   Emergency Department Emergency Medicine Go to  If symptoms worsen [address] [facility] [address] [phone]           Discharge Medication List as of 8/18/2020  1:46 PM      START taking these medications    Details   cetirizine (ZyrTEC) 5 MG chewable tablet Chew 2 tablets (10 mg total) daily for 5 days, Starting Tue 8/18/2020, Until Sun 8/23/2020, Normal      famotidine (PEPCID) 20 mg tablet Take 1 tablet (20 mg total) by mouth 2 (two) times a day for 5 days, Starting Tue 8/18/2020, Until Sun 8/23/2020, Normal      predniSONE 20 mg tablet Take 2 5 tablets (50 mg total) by mouth daily for 5 days, Starting Tue 8/18/2020, Until Sun 8/23/2020, Normal         CONTINUE these medications which have NOT CHANGED    Details   ARIPiprazole (ABILIFY) 2 mg tablet Take 2 mg by mouth daily, Starting Wed 6/10/2020, Historical Med      ibuprofen (MOTRIN) 600 mg tablet Take 1 tablet (600 mg total) by mouth every 6 (six) hours as needed for mild pain, Starting Mon 12/23/2019, Print      lamoTRIgine ER 50 MG TB24 Take 1 tablet by mouth every evening, Starting Tue 5/19/2020, Historical Med      propranolol (INDERAL) 40 mg tablet Take 40 mg by mouth 3 (three) times a day, Starting Tue 6/9/2020, Historical Med           No discharge procedures on file      PDMP Review     None          ED Provider  Electronically Signed by           Margarita Torres MD  08/18/20 8817

## 2020-08-19 LAB
ATRIAL RATE: 101 BPM
ATRIAL RATE: 92 BPM
P AXIS: 59 DEGREES
P AXIS: 73 DEGREES
PR INTERVAL: 138 MS
PR INTERVAL: 146 MS
QRS AXIS: 26 DEGREES
QRS AXIS: 47 DEGREES
QRSD INTERVAL: 82 MS
QRSD INTERVAL: 84 MS
QT INTERVAL: 318 MS
QT INTERVAL: 320 MS
QTC INTERVAL: 395 MS
QTC INTERVAL: 412 MS
T WAVE AXIS: 15 DEGREES
T WAVE AXIS: 3 DEGREES
VENTRICULAR RATE: 101 BPM
VENTRICULAR RATE: 92 BPM

## 2020-08-19 PROCEDURE — 93010 ELECTROCARDIOGRAM REPORT: CPT | Performed by: INTERNAL MEDICINE

## 2020-08-20 LAB — LAMOTRIGINE SERPL-MCNC: 1.9 UG/ML (ref 2–20)

## 2021-05-05 ENCOUNTER — APPOINTMENT (OUTPATIENT)
Dept: LAB | Facility: CLINIC | Age: 31
End: 2021-05-05
Payer: COMMERCIAL

## 2021-05-05 ENCOUNTER — TRANSCRIBE ORDERS (OUTPATIENT)
Dept: LAB | Facility: CLINIC | Age: 31
End: 2021-05-05

## 2021-05-05 ENCOUNTER — CONSULT (OUTPATIENT)
Dept: PAIN MEDICINE | Facility: CLINIC | Age: 31
End: 2021-05-05
Payer: COMMERCIAL

## 2021-05-05 ENCOUNTER — HOSPITAL ENCOUNTER (OUTPATIENT)
Dept: RADIOLOGY | Facility: HOSPITAL | Age: 31
Discharge: HOME | End: 2021-05-05
Attending: ANESTHESIOLOGY
Payer: COMMERCIAL

## 2021-05-05 VITALS
RESPIRATION RATE: 18 BRPM | SYSTOLIC BLOOD PRESSURE: 154 MMHG | HEART RATE: 72 BPM | HEIGHT: 68 IN | WEIGHT: 207 LBS | DIASTOLIC BLOOD PRESSURE: 97 MMHG | BODY MASS INDEX: 31.37 KG/M2

## 2021-05-05 DIAGNOSIS — M25.552 LEFT HIP PAIN: ICD-10-CM

## 2021-05-05 DIAGNOSIS — M79.18 MYOFASCIAL PAIN SYNDROME: ICD-10-CM

## 2021-05-05 DIAGNOSIS — M54.9 MID BACK PAIN: ICD-10-CM

## 2021-05-05 DIAGNOSIS — G89.4 CHRONIC PAIN SYNDROME: Primary | ICD-10-CM

## 2021-05-05 DIAGNOSIS — G89.4 CHRONIC PAIN SYNDROME: ICD-10-CM

## 2021-05-05 LAB
CRP SERPL QL: <3 MG/L
ERYTHROCYTE [SEDIMENTATION RATE] IN BLOOD: 1 MM/HOUR (ref 0–14)
RHEUMATOID FACT SER QL LA: NEGATIVE

## 2021-05-05 PROCEDURE — 86038 ANTINUCLEAR ANTIBODIES: CPT

## 2021-05-05 PROCEDURE — 85652 RBC SED RATE AUTOMATED: CPT

## 2021-05-05 PROCEDURE — 36415 COLL VENOUS BLD VENIPUNCTURE: CPT

## 2021-05-05 PROCEDURE — 86430 RHEUMATOID FACTOR TEST QUAL: CPT

## 2021-05-05 PROCEDURE — 72072 X-RAY EXAM THORAC SPINE 3VWS: CPT

## 2021-05-05 PROCEDURE — 73522 X-RAY EXAM HIPS BI 3-4 VIEWS: CPT

## 2021-05-05 PROCEDURE — 86140 C-REACTIVE PROTEIN: CPT

## 2021-05-05 PROCEDURE — 99204 OFFICE O/P NEW MOD 45 MIN: CPT | Performed by: ANESTHESIOLOGY

## 2021-05-05 PROCEDURE — 86618 LYME DISEASE ANTIBODY: CPT

## 2021-05-05 RX ORDER — METHOCARBAMOL 750 MG/1
750 TABLET, FILM COATED ORAL
Qty: 30 TABLET | Refills: 2 | Status: SHIPPED | OUTPATIENT
Start: 2021-05-05 | End: 2021-08-30 | Stop reason: SDUPTHER

## 2021-05-05 RX ORDER — MELOXICAM 15 MG/1
15 TABLET ORAL DAILY
Qty: 30 TABLET | Refills: 1 | Status: SHIPPED | OUTPATIENT
Start: 2021-05-05 | End: 2021-07-06 | Stop reason: SDUPTHER

## 2021-05-05 RX ORDER — ACETAMINOPHEN 325 MG/1
650 TABLET ORAL EVERY 6 HOURS PRN
COMMUNITY
End: 2021-09-17 | Stop reason: ALTCHOICE

## 2021-05-05 NOTE — PROGRESS NOTES
Assessment  1  Chronic pain syndrome    2  Mid back pain    3  Left hip pain    4  Myofascial pain syndrome        Plan  The patient's symptoms, history/physical are consistent with pain that is multifactorial in origin but predominantly the result of widespread myofascial pain and likely an underlying arthritic component  His prior x-ray lumbar spine did show a chronic wedge deformity in the lower thoracic region as well  His main pain generator is in his lower thoracic so at this time I will get dedicated x-rays of the thoracic spine  I will also order an inflammatory blood work panel  I will also order x-rays of the hip/pelvis  For now, I will start him on meloxicam 15 mg daily to help with inflammation and pain which he will take in the morning  I will also start him on methocarbamol 750 mg at bedtime to help with muscle spasms  My impressions and treatment recommendations were discussed in detail with the patient who verbalized understanding and had no further questions  Discharge instructions were provided  I personally saw and examined the patient and I agree with the above discussed plan of care  Orders Placed This Encounter   Procedures    X-ray thoracic spine 3 views     Standing Status:   Future     Standing Expiration Date:   5/5/2025     Scheduling Instructions:      Bring along any outside films relating to this procedure   XR hips bilateral 3-4 vw w pelvis if performed     Standing Status:   Future     Standing Expiration Date:   5/5/2025     Scheduling Instructions:      Bring along any outside films relating to this procedure            YANIV Screen w/ Reflex to Titer/Pattern     Standing Status:   Future     Standing Expiration Date:   5/5/2022    C-reactive protein     Standing Status:   Future     Standing Expiration Date:   5/5/2022    RF Screen w/ Reflex to Titer     Standing Status:   Future     Standing Expiration Date:   5/5/2022    Sedimentation rate, automated Standing Status:   Future     Standing Expiration Date:   5/5/2022    Lyme disease, western blot     Standing Status:   Future     Standing Expiration Date:   5/5/2022     New Medications Ordered This Visit   Medications    acetaminophen (TYLENOL) 325 mg tablet     Sig: Take 650 mg by mouth every 6 (six) hours as needed for mild pain    meloxicam (MOBIC) 15 mg tablet     Sig: Take 1 tablet (15 mg total) by mouth daily     Dispense:  30 tablet     Refill:  1    methocarbamol (ROBAXIN) 750 mg tablet     Sig: Take 1 tablet (750 mg total) by mouth daily at bedtime as needed for muscle spasms     Dispense:  30 tablet     Refill:  2       History of Present Illness    Samuel Allen is a 31 y o  male who presents for consultation in regards to neck pain back pain hip and leg pain that has been present for over 10 years  Symptoms are severe rated 9-10/10 on numeric rating scale and felt constantly  Symptoms are dull, aching, sharp shooting with numbness and paresthesias  He reports difficulty with standing sometimes  Pain symptoms are aggravated standing, sitting, walking and decreased with relaxation and lying down  There is no change with coughing, sneezing or bowel movements  Treatment history has included physical therapy and exercise which have provided no relief  Heat/ice has provided moderate relief  He uses Tylenol as needed which provides minimal relief  I have personally reviewed and/or updated the patient's past medical history, past surgical history, family history, social history, current medications, allergies, and vital signs today  Review of Systems   Constitutional: Negative for fever and unexpected weight change  HENT: Negative for trouble swallowing  Eyes: Negative for visual disturbance  Respiratory: Positive for cough and shortness of breath  Negative for wheezing  Cardiovascular: Positive for leg swelling  Negative for chest pain and palpitations     Gastrointestinal: Negative for constipation, diarrhea, nausea and vomiting  Endocrine: Negative for cold intolerance, heat intolerance and polydipsia  Genitourinary: Negative for difficulty urinating and frequency  Musculoskeletal: Positive for back pain and joint swelling  Negative for arthralgias, gait problem and myalgias  Neck, B/L Hand, Hip & Knee Pain   Skin: Negative for rash  Neurological: Negative for dizziness, seizures, syncope, weakness and headaches  Hematological: Does not bruise/bleed easily  Psychiatric/Behavioral: Negative for dysphoric mood  All other systems reviewed and are negative  There is no problem list on file for this patient  Past Medical History:   Diagnosis Date    Asthma     Depressed        Past Surgical History:   Procedure Laterality Date    APPENDECTOMY         History reviewed  No pertinent family history      Social History     Occupational History    Not on file   Tobacco Use    Smoking status: Never Smoker    Smokeless tobacco: Never Used   Substance and Sexual Activity    Alcohol use: No    Drug use: No    Sexual activity: Not on file       Current Outpatient Medications on File Prior to Visit   Medication Sig    acetaminophen (TYLENOL) 325 mg tablet Take 650 mg by mouth every 6 (six) hours as needed for mild pain    [DISCONTINUED] ARIPiprazole (ABILIFY) 2 mg tablet Take 2 mg by mouth daily    [DISCONTINUED] cetirizine (ZyrTEC) 5 MG chewable tablet Chew 2 tablets (10 mg total) daily for 5 days    [DISCONTINUED] famotidine (PEPCID) 20 mg tablet Take 1 tablet (20 mg total) by mouth 2 (two) times a day for 5 days    [DISCONTINUED] ibuprofen (MOTRIN) 600 mg tablet Take 1 tablet (600 mg total) by mouth every 6 (six) hours as needed for mild pain (Patient not taking: Reported on 5/5/2021)    [DISCONTINUED] lamoTRIgine ER 50 MG TB24 Take 1 tablet by mouth every evening    [DISCONTINUED] propranolol (INDERAL) 40 mg tablet Take 40 mg by mouth 3 (three) times a day     No current facility-administered medications on file prior to visit  No Known Allergies    Physical Exam    /97   Pulse 72   Resp 18   Ht 5' 8" (1 727 m)   Wt 93 9 kg (207 lb)   BMI 31 47 kg/m²     Constitutional: normal, well developed, well nourished, alert, in no distress and non-toxic and no overt pain behavior  and obese  Eyes: anicteric  HEENT: grossly intact  Neck: supple, symmetric, trachea midline and no masses   Pulmonary:even and unlabored  Cardiovascular:No edema or pitting edema present  Skin:Normal without rashes or lesions and well hydrated  Psychiatric:Mood and affect appropriate  Neurologic:Cranial Nerves II-XII grossly intact  Musculoskeletal:normal     Thoracic Spine Exam  Appearance:  Kyphosis  Palpation/Tenderness:  left thoracic paraspinal tenderness  right thoracic paraspinal tenderness  left thoracic spasm  right thoracic spasm  Range of Motion:  Flexion:  Minimally limited  with pain  Extension:  Minimally limited  with pain  Lateral Flexion - Left:  No limitation  without pain  Lateral Flexion - Right:  No limitation  without pain  Motor Strength:  Left hip flexion:  5/5  Left hip extension:  5/5  Right hip flexion:  5/5  Right hip extension:  5/5  Left knee flexion:  5/5  Left knee extension:  5/5  Right knee flexion:  5/5  Right knee extension:  5/5  Left foot dorsiflexion:  5/5  Left foot plantar flexion:  5/5  Right foot dorsiflexion:  5/5  Right foot plantar flexion:  5/5  Reflexes:  Left Patellar:  2+   Right Patellar:  2+   Left Achilles:  2+   Right Achilles:  2+     Imaging    Study Result    LUMBAR SPINE     INDICATION:   R52: Pain, unspecified  F32 9: Major depressive disorder, single episode, unspecified  F41 1: Generalized anxiety disorder  G47 00: Insomnia, unspecified  F11 10: Opioid abuse, uncomplicated  G13 60:  Opioid dependence, in remission      COMPARISON:  None     VIEWS:  XR SPINE LUMBAR MINIMUM 4 VIEWS NON INJURY        FINDINGS:     There are 5 non rib bearing lumbar vertebral bodies       There is no evidence of acute fracture or destructive osseous lesion      T11, T12 mild chronic appearing anterior wedge deformities with T11-T12, T12-L1 degenerative disc changes      L1-L2 through L4-L5 slight retrolisthesis    Anatomic alignment otherwise       No significant lumbar degenerative change noted      The pedicles appear intact      Soft tissues are unremarkable      IMPRESSION:     Lower thoracic chronic and degenerative change      No acute osseous abnormality

## 2021-05-05 NOTE — PATIENT INSTRUCTIONS
Core Strengthening Exercises   WHAT YOU NEED TO KNOW:   What do I need to know about core strengthening exercises? Core strengthening exercises help heal and strengthen muscles of your hips, back, and abdomen to prevent reinjury  They are beginning exercises to help support your spine  Ask your healthcare provider if you need to see a physical therapist for more advanced exercises  · Do the exercises on a mat or firm surface  (not on a bed) to support your spine and avoid low back pain  · Do the exercises in the same order every time  to train your muscles to work together  Your healthcare provider will show you how to perform these exercises  Do them every day, or as directed by your healthcare provider  · Move slowly and smoothly  Avoid fast or jerky motions  · Stop if you feel pain  It is normal to feel some discomfort at first  Regular exercise will help decrease your discomfort over time  How do I perform core strengthening exercises safely? Hold each exercise for 5 seconds  When you can do the exercise without pain for 5 seconds, increase your hold to 10 to 15 seconds  When you can do the exercise without pain for 10 to 15 seconds, add the next exercise  Increase the time you hold each exercise, or repeat the exercises as directed  As you do each exercise, breathe normally  Do not hold your breath  · Abdominal bracing:  Lie on your back with your knees bent and feet flat on the floor  Place your arms in a relaxed position beside your body  Pull your belly button in toward your spine  Do not flatten or arch your back  Tighten the abdominal muscles below your belly button  Hold for 5 seconds  Begin all of your exercises with abdominal bracing  You can also practice abdominal bracing throughout the day while you are sitting or standing  · Bridging:  Lie on your back with your knees bent and feet flat on the floor  Rest your arms at your side   Tighten your buttocks, and then lift your hips 1 inch off the floor  Hold for 5 seconds  When you can do this exercise without pain for 10 seconds, increase the distance you lift your hips  A good goal is to be able to lift your hips so that your shoulders, hips, and knees are in a straight line  · Curl up:  Lie on your back with your knees bent and feet flat on the floor  Place your hands, palms down, underneath the curve in your lower back  Next, with your elbows on the floor, lift your shoulders and chest 2 to 3 inches  Keep your head in line with your shoulders  Hold this position for 5 seconds  When you can do this exercise without pain for 10 to 15 seconds, you may add a rotation  While your shoulders and chest are lifted off the ground, turn slightly to the left and hold  Repeat on the other side  · Dead bug:  Lie on your back with your knees bent and feet flat on the floor  Place your arms in a relaxed position beside your body  Begin with abdominal bracing  Next, raise one leg, keeping your knee bent  Hold for 5 seconds  Repeat with the other leg  When you can do this exercise without pain for 10 to 15 seconds, you may raise one straight leg and hold  Repeat with the other leg  · Quadruped:  Place your hands and knees on the floor  Keep your wrists directly below your shoulders and your knees directly below your hips  Pull your belly button in toward your spine  Do not flatten or arch your back  Tighten your abdominal muscles below your belly button  Hold for 5 seconds  When you can do this exercise without pain for 10 to 15 seconds, you may extend one arm and hold  Repeat on the other side  · Side Bridge:      ¨ Standing side bridge:  Stand next to a wall and extend one arm toward the wall  Place your palm flat on the wall with your fingers pointing upward  Begin with abdominal bracing  Next, without moving your feet, slowly bend your arm to 90 degrees  Hold for 5 seconds  Repeat on the other side   When you can do this exercise without pain for 10 to 15 seconds, you may do the bent leg side bridge on the floor  ¨ Bent leg side bridge:  Lie on one side with your legs, hips, and shoulders in a straight line  Prop yourself up onto your forearm so your elbow is directly below your shoulder  Bend your knees back to 90 degrees  Begin with abdominal bracing  Next, lift your hips and balance yourself on your forearm and knees  Hold for 5 seconds  Repeat on the other side  When you can do this exercise without pain for 10 to 15 seconds, you may do the straight leg side bridge on the floor  ¨ Straight leg side bridge:  Lie on one side with your legs, hips, and shoulders in a straight line  Prop yourself up onto your forearm so your elbow is directly below your shoulder  Begin with abdominal bracing  Lift your hips off the floor and balance yourself on your forearm and the outside of your flexed foot  Do not let your ankle bend sideways  Hold for 5 seconds  Repeat on the other side  When you can do this exercise without pain for 10 to 15 seconds, ask your healthcare provider for more advanced exercises  When should I contact my healthcare provider? · Your pain becomes worse  · You have new pain  · You have questions or concerns about your condition, care, or exercise program   CARE AGREEMENT:   You have the right to help plan your care  Learn about your health condition and how it may be treated  Discuss treatment options with your caregivers to decide what care you want to receive  You always have the right to refuse treatment  The above information is an  only  It is not intended as medical advice for individual conditions or treatments  Talk to your doctor, nurse or pharmacist before following any medical regimen to see if it is safe and effective for you    © 2016 [address] is for End User's use only and may not be sold, redistributed or otherwise used for commercial purposes  All illustrations and images included in CareNotes® are the copyrighted property of [employer]  or Dave Ramandeep  Neck Exercises   WHAT YOU NEED TO KNOW:   Why is it important to do neck exercises? Neck exercises help reduce neck pain, and improve neck movement and strength  Neck exercises also help prevent long-term neck problems  What do I need to know about neck exercises? · Do the exercises every day,  or as often as directed by your healthcare provider  · Move slowly, gently, and smoothly  Avoid fast or jerky motions  · Stand and sit the way your healthcare provider shows you  Good posture may reduce your neck pain  Check your posture often, even when you are not doing your neck exercises  How do I perform neck exercises safely? · Exercise position:  You may sit or stand while you do neck exercises  Face forward  Your shoulders should be straight and relaxed, with a good posture  · Head tilts, forward and back:  Gently bow your head and try to touch your chin to your chest  Your healthcare provider may tell you to push on the back of your neck to help bow your head  Raise your chin back to the starting position  Tilt your head back as far as possible so you are looking up at the ceiling  Your healthcare provider may tell you to lift your chin to help tilt your head back  Return your head to the starting position  · Head tilts, side to side:  Tilt your head, bringing your ear toward your shoulder  Then tilt your head toward the other shoulder  · Head turns:  Turn your head to look over your shoulder  Tilt your chin down and try to touch it to your shoulder  Do not raise your shoulder to your chin  Face forward again  Do the same on the other side  · Head rolls:  Slowly bring your chin toward your chest  Next, roll your head to the right  Your ear should be positioned over your shoulder   Hold this position for 5 seconds  Roll your head back toward your chest and to the left into the same position  Hold for 5 seconds  Gently roll your head back and around in a clockwise [place] 3 times  Next, move your head in the reverse direction (counterclockwise) in a [place] 3 times  Do not shrug your shoulders upwards while you do this exercise  When should I contact my healthcare provider? · Your pain does not get better, or gets worse  · You have questions or concerns about your condition, care, or exercise program     CARE AGREEMENT:   You have the right to help plan your care  Learn about your health condition and how it may be treated  Discuss treatment options with your healthcare providers to decide what care you want to receive  You always have the right to refuse treatment  The above information is an  only  It is not intended as medical advice for individual conditions or treatments  Talk to your doctor, nurse or pharmacist before following any medical regimen to see if it is safe and effective for you  © Copyright [address] Information is for End User's use only and may not be sold, redistributed or otherwise used for commercial purposes   All illustrations and images included in CareNotes® are the copyrighted property of [employer]  or [address] [place], [place] [address]O[place]

## 2021-05-06 LAB — B BURGDOR IGG+IGM SER-ACNC: 2

## 2021-05-07 LAB — RYE IGE QN: NEGATIVE

## 2021-05-10 ENCOUNTER — TELEPHONE (OUTPATIENT)
Dept: PAIN MEDICINE | Facility: CLINIC | Age: 31
End: 2021-05-10

## 2021-05-10 DIAGNOSIS — S22.000S THORACIC COMPRESSION FRACTURE, SEQUELA: Primary | ICD-10-CM

## 2021-05-10 NOTE — TELEPHONE ENCOUNTER
Pt informed no weight restriction needed b/c the compression fxs are chronic  Pt told that  ordered MRI of thoracic spine, pt given phone # for CS  Pt told that  will call him with the MRI results and for now to cont on the same meds  Pt understood and appreciated the c/b

## 2021-05-10 NOTE — TELEPHONE ENCOUNTER
RN informed pt that his blood work was normal but per FQ his xray shows old mild compression fractures at T11 and T12  Pt was asked how the meloxicam and methocarbamol are working  Pt said the pain remains the same they have not made any difference with his pain  Pt asking what the next step would be?

## 2021-05-10 NOTE — TELEPHONE ENCOUNTER
Please let patient know that blood work was normal but x-rays did show old mild compression fractures at T11 and T12 at the level where he is experiencing pain    Please get update on how meloxicam and methocarbamol are working

## 2021-05-12 ENCOUNTER — HOSPITAL ENCOUNTER (OUTPATIENT)
Dept: MRI IMAGING | Facility: HOSPITAL | Age: 31
Discharge: HOME | End: 2021-05-12
Payer: COMMERCIAL

## 2021-05-12 DIAGNOSIS — S22.000S THORACIC COMPRESSION FRACTURE, SEQUELA: ICD-10-CM

## 2021-05-12 PROCEDURE — 72146 MRI CHEST SPINE W/O DYE: CPT

## 2021-05-12 PROCEDURE — G1004 CDSM NDSC: HCPCS

## 2021-05-17 ENCOUNTER — TELEPHONE (OUTPATIENT)
Dept: PAIN MEDICINE | Facility: CLINIC | Age: 31
End: 2021-05-17

## 2021-05-17 DIAGNOSIS — M51.14 INTERVERTEBRAL DISC DISORDER WITH RADICULOPATHY OF THORACIC REGION: Primary | ICD-10-CM

## 2021-05-17 NOTE — TELEPHONE ENCOUNTER
Left voicemail to go over MRI thoracic spine results which shows small anterior wedge compression at T11 as seen on prior x-ray as well as disc bulges at T9-10 and T10-11  Will likely recommend proceeding with thoracic epidural steroid injection at the T 10 level

## 2021-05-17 NOTE — TELEPHONE ENCOUNTER
RN informed pt of results of thoracic MRI as stated in task  Pt told that FQ rec Thoracic TITO  Pt is interested in getting inj  Pt has not had covid vaccines and none are scheduled  Pt informed that if he decides on getting the vaccine he will need to wait until  2 wks after inj  Pt understood  Told pt he will get a call from our  to set up inj

## 2021-05-17 NOTE — TELEPHONE ENCOUNTER
Pt is calling stating that he is in a lot of back pain when he is walking and standing pain level 9-10/10       Pt also stated he has a completed MRI and would like to know the results of it    Please advise  Pt can be reached at [phone]

## 2021-05-18 NOTE — TELEPHONE ENCOUNTER
Scheduled pt for T10 TESI for 6/10/21  Pt denies rx blood thinners  Pt is taking excedrin and was instructed to hold for 6 days with last dose on 6/3/21, pt is also taking meloxicam and was instructed to hold for 4 days with last dose on 6/5/21  Went over pre-procedure instructions below:  Nothing to eat or drink 1 hr prior to procedure  Need to arrange transportation  Proper clothing for procedure  If ill or placed on antibiotics please call to reschedule  Covid/travel/ and vaccine instructions

## 2021-06-10 ENCOUNTER — HOSPITAL ENCOUNTER (OUTPATIENT)
Dept: RADIOLOGY | Facility: CLINIC | Age: 31
Discharge: HOME | End: 2021-06-10
Attending: ANESTHESIOLOGY | Admitting: ANESTHESIOLOGY
Payer: COMMERCIAL

## 2021-06-10 VITALS
TEMPERATURE: 97.8 F | DIASTOLIC BLOOD PRESSURE: 90 MMHG | SYSTOLIC BLOOD PRESSURE: 147 MMHG | HEART RATE: 68 BPM | RESPIRATION RATE: 20 BRPM | OXYGEN SATURATION: 96 %

## 2021-06-10 DIAGNOSIS — M51.14 INTERVERTEBRAL DISC DISORDER WITH RADICULOPATHY OF THORACIC REGION: ICD-10-CM

## 2021-06-10 PROCEDURE — 62321 NJX INTERLAMINAR CRV/THRC: CPT | Performed by: ANESTHESIOLOGY

## 2021-06-10 RX ORDER — METHYLPREDNISOLONE ACETATE 80 MG/ML
80 INJECTION, SUSPENSION INTRA-ARTICULAR; INTRALESIONAL; INTRAMUSCULAR; PARENTERAL; SOFT TISSUE ONCE
Status: COMPLETED | OUTPATIENT
Start: 2021-06-10 | End: 2021-06-10

## 2021-06-10 RX ADMIN — METHYLPREDNISOLONE ACETATE 80 MG: 80 INJECTION, SUSPENSION INTRA-ARTICULAR; INTRALESIONAL; INTRAMUSCULAR; PARENTERAL; SOFT TISSUE at 15:10

## 2021-06-10 RX ADMIN — IOHEXOL 1 ML: 300 INJECTION, SOLUTION INTRAVENOUS at 15:10

## 2021-06-10 NOTE — DISCHARGE INSTR - LAB
Epidural Steroid Injection   WHAT YOU NEED TO KNOW:   An epidural steroid injection (TITO) is a procedure to inject steroid medicine into the epidural space  The epidural space is between your spinal cord and vertebrae  Steroids reduce inflammation and fluid buildup in your spine that may be causing pain  You may be given pain medicine along with the steroids  ACTIVITY  · Do not drive or operate machinery today  · No strenuous activity today - bending, lifting, etc   · You may resume normal activites starting tomorrow - start slowly and as tolerated  · You may shower today, but no tub baths or hot tubs  · You may have numbness for several hours from the local anesthetic  Please use caution and common sense, especially with weight-bearing activities  CARE OF THE INJECTION SITE  · If you have soreness or pain, apply ice to the area today (20 minutes on/20 minutes off)  · Starting tomorrow, you may use warm, moist heat or ice if needed  · You may have an increase or change in your discomfort for 36-48 hours after your treatment  · Apply ice and continue with any pain medication you have been prescribed  · Notify the [facility] if you have any of the following: redness, drainage, swelling, headache, stiff neck or fever above 100°F     SPECIAL INSTRUCTIONS  · Our office will contact you in approximately 7 days for a progress report  MEDICATIONS  · Continue to take all routine medications  · Our office may have instructed you to hold some medications  As no general anesthesia was used in today's procedure, you should not experience any side effects related to anesthesia  If you have a problem specifically related to your procedure, please call our office at [phone]  Problems not related to your procedure should be directed to your primary care physician

## 2021-06-10 NOTE — H&P
History of Present Illness: The patient is a 31 y o  male who presents with complaints of mid to low back pain secondary to thoracic disc bulging and is here today for thoracic epidural steroid injection  There is no problem list on file for this patient  Past Medical History:   Diagnosis Date    Asthma     Depressed        Past Surgical History:   Procedure Laterality Date    APPENDECTOMY           Current Outpatient Medications:     acetaminophen (TYLENOL) 325 mg tablet, Take 650 mg by mouth every 6 (six) hours as needed for mild pain, Disp: , Rfl:     meloxicam (MOBIC) 15 mg tablet, Take 1 tablet (15 mg total) by mouth daily, Disp: 30 tablet, Rfl: 1    methocarbamol (ROBAXIN) 750 mg tablet, Take 1 tablet (750 mg total) by mouth daily at bedtime as needed for muscle spasms, Disp: 30 tablet, Rfl: 2    Current Facility-Administered Medications:     iohexol (OMNIPAQUE) 300 mg/mL injection 50 mL, 50 mL, Epidural, Once, Partha Cosby MD    methylPREDNISolone acetate (DEPO-MEDROL) injection 80 mg, 80 mg, Epidural, Once, Partha Cosby MD    No Known Allergies    Physical Exam:   Vitals:    06/10/21 1443   BP: 124/90   Pulse: 78   Resp: 20   Temp: 97 8 °F (36 6 °C)   SpO2: 96%     General: Awake, Alert, Oriented x 3, Mood and affect appropriate  Respiratory: Respirations even and unlabored  Cardiovascular: Peripheral pulses intact; no edema  Musculoskeletal Exam:  Midback    ASA Score: 2    Patient/Chart Verification  Patient ID Verified: Verbal  Consents Confirmed: To be obtained in the Pre-Procedure area  H&P( within 30 days) Verified: To be obtained in the Pre-Procedure area  Allergies Reviewed: Yes  Anticoag/NSAID held?: Yes(meloxicam held 4 days)  Currently on antibiotics?: No    Assessment:   1   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

## 2021-06-17 ENCOUNTER — TELEPHONE (OUTPATIENT)
Dept: RADIOLOGY | Facility: CLINIC | Age: 31
End: 2021-06-17

## 2021-06-17 NOTE — TELEPHONE ENCOUNTER
Patient reports that he is doing good  Patient states that he has received 80% relief with a pain scale of 2/10  Patient states that he still has a dull ache in his left leg

## 2021-07-01 NOTE — TELEPHONE ENCOUNTER
Pt called in to let provider know that the pain has worsened and would like to know what the next step will be   Please be advise thank you        Please call patient back @ [phone]

## 2021-07-01 NOTE — TELEPHONE ENCOUNTER
Pt of Dr Azevedo Schooling    Pt said the inj only helped for 1 week and slowly has come back  The amt of pain is close to the same amt he had prior to inj  Pt said FQ told him at the procedure to hold off on picking up the 2nd bottle of meloxicam and to see how the inj helps but he could continue to take the methocarbamol at HS  RN advised pt to resume taking the meloxicam 15 mg daily and continue with methocarbamol  RN explained that FQ is out of office for 2 wks so I would reach out to on call provider for what the next step should be  Pt is s/p Thoracic TITO @ T10 from 6/10/21

## 2021-07-01 NOTE — TELEPHONE ENCOUNTER
Pt informed of the same as stated in previous task from [facility]  Pt given same day appt slot w/ [facility] for 7/9 at 1 pm for re-eval post inj   Pt very appreciative of c/b

## 2021-07-01 NOTE — TELEPHONE ENCOUNTER
I agree that patient should restart meloxicam and should come in next week for an office visit with a same-day appointment if there is nothing available

## 2021-07-06 DIAGNOSIS — G89.4 CHRONIC PAIN SYNDROME: ICD-10-CM

## 2021-07-06 NOTE — TELEPHONE ENCOUNTER
S/w pt regarding refill request, states meloxicam had been helpful when he was taking it  Pt has no medication remaining  LP 5/5 one refill  Please advise, thank you

## 2021-07-07 RX ORDER — MELOXICAM 15 MG/1
15 TABLET ORAL DAILY
Qty: 30 TABLET | Refills: 0 | Status: SHIPPED | OUTPATIENT
Start: 2021-07-07 | End: 2021-08-30 | Stop reason: SDUPTHER

## 2021-07-09 ENCOUNTER — TELEMEDICINE (OUTPATIENT)
Dept: PAIN MEDICINE | Facility: CLINIC | Age: 31
End: 2021-07-09
Payer: COMMERCIAL

## 2021-07-09 DIAGNOSIS — G89.4 CHRONIC PAIN SYNDROME: Primary | ICD-10-CM

## 2021-07-09 DIAGNOSIS — M51.14 INTERVERTEBRAL DISC DISORDER WITH RADICULOPATHY OF THORACIC REGION: ICD-10-CM

## 2021-07-09 PROCEDURE — 99213 OFFICE O/P EST LOW 20 MIN: CPT | Performed by: NURSE PRACTITIONER

## 2021-07-09 PROCEDURE — 1036F TOBACCO NON-USER: CPT | Performed by: NURSE PRACTITIONER

## 2021-07-09 NOTE — PATIENT INSTRUCTIONS
Epidural Steroid Injection   AMBULATORY CARE:   What you need to know about an epidural steroid injection (TITO):  An TITO is a procedure to inject steroid medicine into the epidural space  The epidural space is between your spinal cord and vertebrae  Steroids reduce inflammation and fluid buildup in your spine that may be causing pain  You may be given pain medicine along with the steroids  How to prepare for an TITO:  Your healthcare provider will talk to you about how to prepare for your procedure  He or she will tell you what medicines to take or not take on the day of your procedure  You may need to stop taking blood thinners or other medicines several days before your procedure  You may need to adjust any diabetes medicine you take on the day of your procedure  Steroid medicine can increase your blood sugar level  Arrange for someone to drive you home when you are discharged  What will happen during an TITO:   · You will be given medicine to numb the procedure area  You will be awake for the procedure, but you will not feel pain  You may also be given medicine to help you relax  Contrast liquid will be used to help your healthcare provider see the area better  Tell the healthcare provider if you have ever had an allergic reaction to contrast liquid  · Your healthcare provider may place the needle into your neck area, middle of your back, or tailbone area  He may inject the medicine next to the nerves that are causing your pain  He may instead inject the medicine into a larger area of the epidural space  This helps the medicine spread to more nerves  Your healthcare provider will use a fluoroscope to help guide the needle to the right place  A fluoroscope is a type of x-ray  After the procedure, a bandage will be placed over the injection site to prevent infection  What will happen after an TITO:  You will have a bandage over the injection site to prevent infection   Your healthcare provider will tell you when you can bathe and any activity guidelines  You will be able to go home  Risks of an TITO:  You may have temporary or permanent nerve damage or paralysis  You may have bleeding or develop a serious infection, such as meningitis (swelling of the brain coverings)  An abscess may also develop  An abscess is a pus-filled area under the skin  You may need surgery to fix the abscess  You may have a seizure, anxiety, or trouble sleeping  If you are a man, you may have temporary erectile dysfunction (not able to have an erection)  Call your local emergency number (911 in the [address] [place] [address],[address]) if:   · You have a seizure  · You have trouble moving your legs  Seek care immediately if:   · Blood soaks through your bandage  · You have a fever or chills, severe back pain, and the procedure area is sensitive to the touch  · You cannot control when you urinate or have a bowel movement  Call your doctor if:   · You have weakness or numbness in your legs  · Your wound is red, swollen, or draining pus  · You have nausea or are vomiting  · Your face or neck is red and you feel warm  · You have more pain than you had before the procedure  · You have swelling in your hands or feet  · You have questions or concerns about your condition or care  Care for your wound as directed: You may remove the bandage before you go to bed the day of your procedure  You may take a shower, but do not take a bath for at least 24 hours  Self-care:   · Do not drive,  use machines, or do strenuous activity for 24 hours after your procedure or as directed  · Continue other treatments  as directed  Steroid injections alone will not control your pain  The injections are meant to be used with other treatments, such as physical therapy  Follow up with your doctor as directed:  Write down your questions so you remember to ask them during your visits     © Copyright [employer] 2020 Information is for End User's use only and may not be sold, redistributed or otherwise used for commercial purposes  All illustrations and images included in CareNotes® are the copyrighted property of [employer]  or Tomas Hardy  The above information is an  only  It is not intended as medical advice for individual conditions or treatments  Talk to your doctor, nurse or pharmacist before following any medical regimen to see if it is safe and effective for you

## 2021-07-09 NOTE — PROGRESS NOTES
Virtual Brief Visit    Assessment/Plan:  Toya Brennan is a 27 y o  male who was last seen 6/10/2021 in regards to chronic pain syndrome secondary to thoracic intervertebral disc disorder with radiculopathy  I discussed with the patient repeating the epidural steroid injection as it provided 80% relief of his pain symptoms  The most common risks of the procedure were reviewed with the patient  An order was placed for T10 interlaminar epidural steroid injection  Complete risk and benefits including bleeding, infection, tissue reaction, nerve injury, and allergic reaction were discussed  The patient was instructed to restart the meloxicam and that he could also take 1/2 tab of methocarbamol twice daily during the day with a full tab at bedtime  The patient was reminded to not drive or operate heavy machinery while taking this medication  The patient reports he does not need refills of either medication at this time  The patient was instructed to call for refills, if needed  The patient was agreeable and verbalized understanding  The patient will follow-up after thoracic epidural steroid injection or sooner if symptoms worsen in the interim  The patient was agreeable and verbalized understanding  Problem List Items Addressed This Visit        Nervous and Auditory    Intervertebral disc disorder with radiculopathy of thoracic region      Other Visit Diagnoses     Chronic pain syndrome    -  Primary                Reason for visit is   Chief Complaint   Patient presents with    Virtual Brief Visit        Encounter provider NAIMA Chen    Provider located at [address] [place] [id number]    Recent Visits  No visits were found meeting these conditions    Showing recent visits within past 7 days and meeting all other requirements  Today's Visits  Date Type Provider Dept   07/09/21 Telemedicine NAIMA [facility]   Showing today's visits and meeting all other requirements  Future Appointments  No visits were found meeting these conditions  Showing future appointments within next 150 days and meeting all other requirements       After connecting through telephone, the patient was identified by name and date of birth  Zenaida Renner was informed that this is a telemedicine visit and that the visit is being conducted through telephone  My office door was closed  No one else was in the room  He acknowledged consent and understanding of privacy and security of the platform  The patient has agreed to participate and understands he can discontinue the visit at any time  It was my intent to perform this visit via video technology but the patient was not able to do a video connection so the visit was completed via audio telephone only  Patient is aware this is a billable service  Subjective    Zenaida Renner is a 27 y o  male who was last seen 6/10/2021 in regards to chronic pain syndrome secondary to thoracic intervertebral disc disorder with radiculopathy  The patient currently reports ongoing back pain that is unchanged since last office visit  At the last visit, the patient underwent interlaminal epidural steroid injection at T10, which he reports provided 80% relief of his pain symptoms for approximately 1 week  At this time, the patient reports increased back pain with lower extremity numbness  The patient reports no weakness nor bowel or bladder dysfunction and is able to complete ADLs with minimal difficulty  The patient was prescribed meloxicam; however, the patient is not currently taking the medication at this time  The patient also reports he is taking methocarbamol 750mg PO hs with mild to moderate relief      HPI   MRI THORACIC SPINE WITHOUT CONTRAST     INDICATION: S22 000S: Wedge compression fracture of unspecified thoracic vertebra, sequela      COMPARISON:  Plain film dated 4/5/2021     TECHNIQUE:  Sagittal T1, sagittal T2, sagittal inversion recovery, axial T2,  axial 2D MERGE      IMAGE QUALITY: Diagnostic      FINDINGS:     ALIGNMENT: Normal alignment of the thoracic spine  T11-T12 anterior wedge compression appear chronic without significant retropulsion     No subluxation  No scoliosis      MARROW SIGNAL:  Normal marrow signal is identified within the visualized bony structures  No discrete marrow lesion      THORACIC CORD: Normal signal within the thoracic cord      PARAVERTEBRAL SOFT TISSUES:  Renal cysts identified      THORACIC DEGENERATIVE CHANGE: At T9-10 there is a minor disc bulge without significant central canal or foraminal stenosis  At T10 with superimposed right central disc protrusion at abuts the right ventral cord with slight indentation  There is also   mild right central canal narrowing at this level without foraminal stenosis  At T11-12 there is a minor disc bulge without central canal or foraminal stenosis      IMPRESSION:  1  Mild chronic T11/T12 anterior wedge compression deformities without significant retropulsion  2    Mild spondylotic changes of the thoracic spine      Past Medical History:   Diagnosis Date    Asthma     Depressed        Past Surgical History:   Procedure Laterality Date    APPENDECTOMY         Current Outpatient Medications   Medication Sig Dispense Refill    acetaminophen (TYLENOL) 325 mg tablet Take 650 mg by mouth every 6 (six) hours as needed for mild pain      meloxicam (MOBIC) 15 mg tablet Take 1 tablet (15 mg total) by mouth daily 30 tablet 0    methocarbamol (ROBAXIN) 750 mg tablet Take 1 tablet (750 mg total) by mouth daily at bedtime as needed for muscle spasms 30 tablet 2     No current facility-administered medications for this visit  No Known Allergies    Review of Systems    There were no vitals filed for this visit        I spent 10 minutes with patient today in which greater than 50% of the time was spent in counseling/coordination of care regarding treatment planning and medication management    VIRTUAL VISIT DISCLAIMER    Tommy Puga acknowledges that he has consented to an online visit or consultation  He understands that the online visit is based solely on information provided by him, and that, in the absence of a face-to-face physical evaluation by the physician, the diagnosis he receives is both limited and provisional in terms of accuracy and completeness  This is not intended to replace a full medical face-to-face evaluation by the physician  Tommy Puga understands and accepts these terms

## 2021-07-15 ENCOUNTER — TELEPHONE (OUTPATIENT)
Dept: PAIN MEDICINE | Facility: CLINIC | Age: 31
End: 2021-07-15

## 2021-07-28 ENCOUNTER — TELEPHONE (OUTPATIENT)
Dept: PAIN MEDICINE | Facility: CLINIC | Age: 31
End: 2021-07-28

## 2021-07-28 NOTE — TELEPHONE ENCOUNTER
Scheduled pt for TESi for 8/26/21  Pt denies rx blood thinners   Pt is taking meloxicam and was instructed to hold for 4 days with last dose on 8/21/21  Went over pre-procedure instructions below:  Nothing to eat or drink 1 hr prior to procedure  Need to arrange transportation  Proper clothing for procedure  If ill or placed on antibiotics please call to reschedule  Covid/travel/ and vaccine instructions

## 2021-07-28 NOTE — TELEPHONE ENCOUNTER
----- Message from Eder Trujillo RN sent at 7/28/2021  9:33 AM EDT -----  Regarding: FW: Non-Urgent Medical Question  Contact: [phone]  Mary Carmen, Can you reach back out to pt about scheduling his inj, looks like you left  for pt to call you on 7/15/21     ----- Message -----  From: Giovani Joe  Sent: 7/28/2021   8:43 AM EDT  To: [facility]  Subject: Non-Urgent Medical Question                      Hello Doctor,    I was told that I would receive a call to schedule another procedure, that was weeks ago I have not heard anything back yet

## 2021-08-05 ENCOUNTER — OFFICE VISIT (OUTPATIENT)
Dept: FAMILY MEDICINE CLINIC | Facility: CLINIC | Age: 31
End: 2021-08-05
Payer: COMMERCIAL

## 2021-08-05 VITALS
HEIGHT: 68 IN | OXYGEN SATURATION: 100 % | WEIGHT: 200.5 LBS | DIASTOLIC BLOOD PRESSURE: 90 MMHG | HEART RATE: 61 BPM | BODY MASS INDEX: 30.39 KG/M2 | TEMPERATURE: 96.3 F | SYSTOLIC BLOOD PRESSURE: 138 MMHG

## 2021-08-05 DIAGNOSIS — Z11.4 SCREENING FOR HIV (HUMAN IMMUNODEFICIENCY VIRUS): ICD-10-CM

## 2021-08-05 DIAGNOSIS — Z13.29 SCREENING FOR THYROID DISORDER: ICD-10-CM

## 2021-08-05 DIAGNOSIS — Z76.89 ENCOUNTER TO ESTABLISH CARE WITH NEW DOCTOR: Primary | ICD-10-CM

## 2021-08-05 DIAGNOSIS — M51.14 INTERVERTEBRAL DISC DISORDER WITH RADICULOPATHY OF THORACIC REGION: ICD-10-CM

## 2021-08-05 DIAGNOSIS — Z23 ENCOUNTER FOR IMMUNIZATION: ICD-10-CM

## 2021-08-05 DIAGNOSIS — E55.9 VITAMIN D DEFICIENCY: ICD-10-CM

## 2021-08-05 DIAGNOSIS — F41.9 ANXIETY: ICD-10-CM

## 2021-08-05 DIAGNOSIS — Z13.220 SCREENING FOR LIPID DISORDERS: ICD-10-CM

## 2021-08-05 DIAGNOSIS — Z11.59 NEED FOR HEPATITIS C SCREENING TEST: ICD-10-CM

## 2021-08-05 DIAGNOSIS — S22.080D CLOSED WEDGE COMPRESSION FRACTURE OF T11 VERTEBRA WITH ROUTINE HEALING: ICD-10-CM

## 2021-08-05 PROCEDURE — 99204 OFFICE O/P NEW MOD 45 MIN: CPT | Performed by: FAMILY MEDICINE

## 2021-08-05 PROCEDURE — 3008F BODY MASS INDEX DOCD: CPT | Performed by: NURSE PRACTITIONER

## 2021-08-05 RX ORDER — VENLAFAXINE HYDROCHLORIDE 75 MG/1
75 CAPSULE, EXTENDED RELEASE ORAL
Qty: 30 CAPSULE | Refills: 5 | Status: SHIPPED | OUTPATIENT
Start: 2021-08-05 | End: 2021-09-17 | Stop reason: ALTCHOICE

## 2021-08-05 RX ORDER — HYDROXYZINE 50 MG/1
50 TABLET, FILM COATED ORAL 3 TIMES DAILY PRN
Qty: 30 TABLET | Refills: 0 | Status: SHIPPED | OUTPATIENT
Start: 2021-08-05 | End: 2021-08-15 | Stop reason: SDUPTHER

## 2021-08-12 ENCOUNTER — APPOINTMENT (OUTPATIENT)
Dept: LAB | Facility: CLINIC | Age: 31
End: 2021-08-12
Payer: COMMERCIAL

## 2021-08-12 DIAGNOSIS — Z11.4 SCREENING FOR HIV (HUMAN IMMUNODEFICIENCY VIRUS): ICD-10-CM

## 2021-08-12 DIAGNOSIS — Z76.89 ENCOUNTER TO ESTABLISH CARE WITH NEW DOCTOR: ICD-10-CM

## 2021-08-12 DIAGNOSIS — E55.9 VITAMIN D DEFICIENCY: ICD-10-CM

## 2021-08-12 DIAGNOSIS — Z13.29 SCREENING FOR THYROID DISORDER: ICD-10-CM

## 2021-08-12 DIAGNOSIS — Z11.59 NEED FOR HEPATITIS C SCREENING TEST: ICD-10-CM

## 2021-08-12 DIAGNOSIS — Z13.220 SCREENING FOR LIPID DISORDERS: ICD-10-CM

## 2021-08-12 LAB
25(OH)D3 SERPL-MCNC: 25 NG/ML (ref 30–100)
ALBUMIN SERPL BCP-MCNC: 4.4 G/DL (ref 3.5–5)
ALP SERPL-CCNC: 81 U/L (ref 46–116)
ALT SERPL W P-5'-P-CCNC: 47 U/L (ref 12–78)
ANION GAP SERPL CALCULATED.3IONS-SCNC: 5 MMOL/L (ref 4–13)
AST SERPL W P-5'-P-CCNC: 22 U/L (ref 5–45)
BASOPHILS # BLD AUTO: 0.03 THOUSANDS/ΜL (ref 0–0.1)
BASOPHILS NFR BLD AUTO: 1 % (ref 0–1)
BILIRUB SERPL-MCNC: 1.09 MG/DL (ref 0.2–1)
BUN SERPL-MCNC: 14 MG/DL (ref 5–25)
CALCIUM SERPL-MCNC: 9.2 MG/DL (ref 8.3–10.1)
CHLORIDE SERPL-SCNC: 104 MMOL/L (ref 100–108)
CHOLEST SERPL-MCNC: 167 MG/DL (ref 50–200)
CO2 SERPL-SCNC: 29 MMOL/L (ref 21–32)
CREAT SERPL-MCNC: 1.14 MG/DL (ref 0.6–1.3)
EOSINOPHIL # BLD AUTO: 0.15 THOUSAND/ΜL (ref 0–0.61)
EOSINOPHIL NFR BLD AUTO: 3 % (ref 0–6)
ERYTHROCYTE [DISTWIDTH] IN BLOOD BY AUTOMATED COUNT: 12.8 % (ref 11.6–15.1)
GFR SERPL CREATININE-BSD FRML MDRD: 85 ML/MIN/1.73SQ M
GLUCOSE P FAST SERPL-MCNC: 113 MG/DL (ref 65–99)
HCT VFR BLD AUTO: 53.5 % (ref 36.5–49.3)
HCV AB SER QL: NORMAL
HDLC SERPL-MCNC: 45 MG/DL
HGB BLD-MCNC: 17.9 G/DL (ref 12–17)
IMM GRANULOCYTES # BLD AUTO: 0.02 THOUSAND/UL (ref 0–0.2)
IMM GRANULOCYTES NFR BLD AUTO: 0 % (ref 0–2)
LDLC SERPL CALC-MCNC: 108 MG/DL (ref 0–100)
LYMPHOCYTES # BLD AUTO: 2.55 THOUSANDS/ΜL (ref 0.6–4.47)
LYMPHOCYTES NFR BLD AUTO: 43 % (ref 14–44)
MCH RBC QN AUTO: 29.1 PG (ref 26.8–34.3)
MCHC RBC AUTO-ENTMCNC: 33.5 G/DL (ref 31.4–37.4)
MCV RBC AUTO: 87 FL (ref 82–98)
MONOCYTES # BLD AUTO: 0.36 THOUSAND/ΜL (ref 0.17–1.22)
MONOCYTES NFR BLD AUTO: 6 % (ref 4–12)
NEUTROPHILS # BLD AUTO: 2.78 THOUSANDS/ΜL (ref 1.85–7.62)
NEUTS SEG NFR BLD AUTO: 47 % (ref 43–75)
NONHDLC SERPL-MCNC: 122 MG/DL
NRBC BLD AUTO-RTO: 0 /100 WBCS
PLATELET # BLD AUTO: 193 THOUSANDS/UL (ref 149–390)
PMV BLD AUTO: 9.4 FL (ref 8.9–12.7)
POTASSIUM SERPL-SCNC: 4.2 MMOL/L (ref 3.5–5.3)
PROT SERPL-MCNC: 7.2 G/DL (ref 6.4–8.2)
RBC # BLD AUTO: 6.15 MILLION/UL (ref 3.88–5.62)
SODIUM SERPL-SCNC: 138 MMOL/L (ref 136–145)
TRIGL SERPL-MCNC: 68 MG/DL
TSH SERPL DL<=0.05 MIU/L-ACNC: 0.95 UIU/ML (ref 0.36–3.74)
WBC # BLD AUTO: 5.89 THOUSAND/UL (ref 4.31–10.16)

## 2021-08-12 PROCEDURE — 82306 VITAMIN D 25 HYDROXY: CPT

## 2021-08-12 PROCEDURE — 80053 COMPREHEN METABOLIC PANEL: CPT

## 2021-08-12 PROCEDURE — 84443 ASSAY THYROID STIM HORMONE: CPT

## 2021-08-12 PROCEDURE — 87389 HIV-1 AG W/HIV-1&-2 AB AG IA: CPT

## 2021-08-12 PROCEDURE — 36415 COLL VENOUS BLD VENIPUNCTURE: CPT

## 2021-08-12 PROCEDURE — 85025 COMPLETE CBC W/AUTO DIFF WBC: CPT

## 2021-08-12 PROCEDURE — 80061 LIPID PANEL: CPT

## 2021-08-12 PROCEDURE — 86803 HEPATITIS C AB TEST: CPT

## 2021-08-13 LAB — HIV 1+2 AB+HIV1 P24 AG SERPL QL IA: NORMAL

## 2021-08-15 DIAGNOSIS — F41.9 ANXIETY: ICD-10-CM

## 2021-08-16 RX ORDER — HYDROXYZINE 50 MG/1
50 TABLET, FILM COATED ORAL 3 TIMES DAILY PRN
Qty: 30 TABLET | Refills: 0 | Status: SHIPPED | OUTPATIENT
Start: 2021-08-16 | End: 2021-08-30 | Stop reason: SDUPTHER

## 2021-08-19 ENCOUNTER — OFFICE VISIT (OUTPATIENT)
Dept: FAMILY MEDICINE CLINIC | Facility: CLINIC | Age: 31
End: 2021-08-19
Payer: COMMERCIAL

## 2021-08-19 VITALS
HEART RATE: 80 BPM | TEMPERATURE: 97.6 F | WEIGHT: 201 LBS | HEIGHT: 68 IN | OXYGEN SATURATION: 98 % | DIASTOLIC BLOOD PRESSURE: 80 MMHG | BODY MASS INDEX: 30.46 KG/M2 | SYSTOLIC BLOOD PRESSURE: 130 MMHG

## 2021-08-19 DIAGNOSIS — R73.03 PRE-DIABETES: ICD-10-CM

## 2021-08-19 DIAGNOSIS — E55.9 VITAMIN D DEFICIENCY: ICD-10-CM

## 2021-08-19 DIAGNOSIS — Z00.00 ANNUAL PHYSICAL EXAM: Primary | ICD-10-CM

## 2021-08-19 DIAGNOSIS — Z23 ENCOUNTER FOR IMMUNIZATION: ICD-10-CM

## 2021-08-19 DIAGNOSIS — F40.10 SOCIAL ANXIETY DISORDER: ICD-10-CM

## 2021-08-19 PROBLEM — E66.09 CLASS 1 OBESITY DUE TO EXCESS CALORIES WITHOUT SERIOUS COMORBIDITY WITH BODY MASS INDEX (BMI) OF 30.0 TO 30.9 IN ADULT: Status: ACTIVE | Noted: 2021-08-19

## 2021-08-19 PROBLEM — E66.811 CLASS 1 OBESITY DUE TO EXCESS CALORIES WITHOUT SERIOUS COMORBIDITY WITH BODY MASS INDEX (BMI) OF 30.0 TO 30.9 IN ADULT: Status: ACTIVE | Noted: 2021-08-19

## 2021-08-19 PROCEDURE — 3725F SCREEN DEPRESSION PERFORMED: CPT | Performed by: FAMILY MEDICINE

## 2021-08-19 PROCEDURE — 99395 PREV VISIT EST AGE 18-39: CPT | Performed by: FAMILY MEDICINE

## 2021-08-19 PROCEDURE — 1036F TOBACCO NON-USER: CPT | Performed by: FAMILY MEDICINE

## 2021-08-19 PROCEDURE — 3008F BODY MASS INDEX DOCD: CPT | Performed by: FAMILY MEDICINE

## 2021-08-19 NOTE — PROGRESS NOTES
[facility]    NAME: Daniel De La Cruz  AGE: 27 y o  SEX: male  : 1990     DATE: 2021     Assessment and Plan:     Problem List Items Addressed This Visit     None      Visit Diagnoses     Annual physical exam    -  Primary    Vitamin D deficiency        Pre-diabetes        Social anxiety disorder        Encounter for immunization              Immunizations and preventive care screenings were discussed with patient today  Appropriate education was printed on patient's after visit summary  Counseling:  · Alcohol/drug use: discussed moderation in alcohol intake, the recommendations for healthy alcohol use, and avoidance of illicit drug use  BMI Counseling: Body mass index is 30 56 kg/m²  The BMI is above normal  Nutrition recommendations include decreasing portion sizes, encouraging healthy choices of fruits and vegetables, decreasing fast food intake and limiting drinks that contain sugar  Exercise recommendations include vigorous physical activity 75 minutes/week and exercising 3-5 times per week  Return in about 4 weeks (around 2021) for Recheck - social anxiety  Chief Complaint:     Chief Complaint   Patient presents with    Annual Exam      History of Present Illness:     Adult Annual Physical   Patient here for a comprehensive physical exam  The patient reports no problems  Diet and Physical Activity  · Diet/Nutrition: well balanced diet and consuming 3-5 servings of fruits/vegetables daily  · Exercise: no formal exercise  Depression Screening  PHQ-9 Depression Screening    PHQ-9:   Frequency of the following problems over the past two weeks:      Little interest or pleasure in doing things: 0 - not at all  Feeling down, depressed, or hopeless: 0 - not at all  PHQ-2 Score: 0       General Health  · Sleep: gets 4-6 hours of sleep on average     · Hearing: normal - bilateral   · Vision: no vision problems, goes for regular eye exams, most recent eye exam <1 year ago and wears contacts  · Dental: regular dental visits, brushes teeth twice daily and does not floss   Health  · History of STDs?: no      Review of Systems:     Review of Systems   Constitutional: Negative  Negative for activity change, appetite change, chills, diaphoresis, fatigue and fever  HENT: Negative  Negative for congestion, dental problem, hearing loss, postnasal drip, sore throat, trouble swallowing and voice change  Eyes: Negative  Negative for pain, redness and visual disturbance  Respiratory: Negative for cough, chest tightness, shortness of breath and wheezing  Cardiovascular: Negative for chest pain, palpitations and leg swelling  Gastrointestinal: Negative for abdominal pain, blood in stool, constipation, diarrhea, nausea and vomiting  Endocrine: Negative  Negative for cold intolerance and heat intolerance  Genitourinary: Negative for decreased urine volume, difficulty urinating, dysuria, frequency, hematuria and urgency  Musculoskeletal: Negative for arthralgias, back pain, joint swelling and myalgias  Skin: Negative  Negative for color change and rash  Allergic/Immunologic: Negative  Neurological: Negative for dizziness, seizures, syncope, weakness, light-headedness and headaches  Hematological: Negative for adenopathy  Psychiatric/Behavioral: Negative for behavioral problems, decreased concentration, dysphoric mood, sleep disturbance and suicidal ideas  The patient is nervous/anxious         Past Medical History:     Past Medical History:   Diagnosis Date    Asthma     Depressed       Past Surgical History:     Past Surgical History:   Procedure Laterality Date    APPENDECTOMY        Social History:     Social History     Socioeconomic History    Marital status: Single     Spouse name: None    Number of children: None    Years of education: None    Highest education level: None   Occupational History    None   Tobacco Use    Smoking status: Former Smoker     Packs/day: 1 00     Years: 10 00     Pack years: 10 00     Types: Cigarettes    Smokeless tobacco: Never Used   Vaping Use    Vaping Use: Never used   Substance and Sexual Activity    Alcohol use: Yes     Comment: monthly    Drug use: No    Sexual activity: Yes     Partners: Female   Other Topics Concern    None   Social History Narrative    None     Social Determinants of Health     Financial Resource Strain:     Difficulty of Paying Living Expenses:    Food Insecurity:     Worried About Running Out of Food in the Last Year:     Ran Out of Food in the Last Year:    Transportation Needs:     Lack of Transportation (Medical):  Lack of Transportation (Non-Medical):    Physical Activity:     Days of Exercise per Week:     Minutes of Exercise per Session:    Stress:     Feeling of Stress :    Social Connections:     Frequency of Communication with Friends and Family:     Frequency of Social Gatherings with Friends and Family:     Attends [employer]ism Services:     Active Member of Clubs or Organizations:     Attends Club or Organization Meetings:     Marital Status:    Intimate Partner Violence:     Fear of Current or Ex-Partner:     Emotionally Abused:     Physically Abused:     Sexually Abused:       Family History:     History reviewed  No pertinent family history     Current Medications:     Current Outpatient Medications   Medication Sig Dispense Refill    hydrOXYzine HCL (ATARAX) 50 mg tablet Take 1 tablet (50 mg total) by mouth 3 (three) times a day as needed for itching 30 tablet 0    meloxicam (MOBIC) 15 mg tablet Take 1 tablet (15 mg total) by mouth daily 30 tablet 0    venlafaxine (EFFEXOR-XR) 75 mg 24 hr capsule Take 1 capsule (75 mg total) by mouth daily with breakfast 30 capsule 5    acetaminophen (TYLENOL) 325 mg tablet Take 650 mg by mouth every 6 (six) hours as needed for mild pain (Patient not taking: Reported on 8/5/2021)      methocarbamol (ROBAXIN) 750 mg tablet Take 1 tablet (750 mg total) by mouth daily at bedtime as needed for muscle spasms 30 tablet 2     No current facility-administered medications for this visit  Allergies:     No Known Allergies   Physical Exam:     /80   Pulse 80   Temp 97 6 °F (36 4 °C)   Ht 5' 8" (1 727 m)   Wt 91 2 kg (201 lb)   SpO2 98%   BMI 30 56 kg/m²     Physical Exam  Vitals and nursing note reviewed  Constitutional:       General: He is not in acute distress  Appearance: Normal appearance  He is well-developed  He is not ill-appearing, toxic-appearing or diaphoretic  HENT:      Head: Normocephalic and atraumatic  Right Ear: Tympanic membrane, ear canal and external ear normal       Left Ear: Tympanic membrane, ear canal and external ear normal       Mouth/Throat:      Mouth: Mucous membranes are moist       Pharynx: Oropharynx is clear  Eyes:      Conjunctiva/sclera: Conjunctivae normal    Neck:      Vascular: No carotid bruit  Cardiovascular:      Rate and Rhythm: Normal rate and regular rhythm  Pulses: Normal pulses  Heart sounds: Normal heart sounds  No murmur heard  Pulmonary:      Effort: Pulmonary effort is normal  No respiratory distress  Breath sounds: Normal breath sounds  Abdominal:      General: Abdomen is flat  There is no distension  Palpations: Abdomen is soft  There is no mass  Tenderness: There is no abdominal tenderness  Musculoskeletal:      Cervical back: Neck supple  No muscular tenderness  Right lower leg: No edema  Left lower leg: No edema  Lymphadenopathy:      Cervical: No cervical adenopathy  Skin:     General: Skin is warm and dry  Neurological:      General: No focal deficit present  Mental Status: He is alert and oriented to person, place, and time  Sensory: No sensory deficit     Psychiatric:         Mood and Affect: Mood normal          Behavior: Behavior normal          Thought Content:  Thought content normal          Judgment: Judgment normal           Amie Peterson, DO   [facility]

## 2021-08-19 NOTE — LETTER
August 19, 2021     Patient: Lam Pearson   YOB: 1990   Date of Visit: 8/19/2021       To Whom it May Concern:    Lam Pearson is under my professional care  He was seen in my office on 8/19/2021  He may return to work on 8/20/2021  If you have any questions or concerns, please don't hesitate to call           Sincerely,          Bianca Ruiz,         CC: No Recipients

## 2021-08-19 NOTE — PATIENT INSTRUCTIONS

## 2021-08-26 ENCOUNTER — HOSPITAL ENCOUNTER (OUTPATIENT)
Dept: RADIOLOGY | Facility: CLINIC | Age: 31
Discharge: HOME | End: 2021-08-26
Attending: ANESTHESIOLOGY | Admitting: ANESTHESIOLOGY
Payer: COMMERCIAL

## 2021-08-26 VITALS
DIASTOLIC BLOOD PRESSURE: 84 MMHG | HEART RATE: 63 BPM | RESPIRATION RATE: 20 BRPM | SYSTOLIC BLOOD PRESSURE: 134 MMHG | TEMPERATURE: 97.6 F | OXYGEN SATURATION: 98 %

## 2021-08-26 DIAGNOSIS — M51.14 INTERVERTEBRAL DISC DISORDER WITH RADICULOPATHY OF THORACIC REGION: ICD-10-CM

## 2021-08-26 PROCEDURE — 62321 NJX INTERLAMINAR CRV/THRC: CPT | Performed by: ANESTHESIOLOGY

## 2021-08-26 RX ORDER — METHYLPREDNISOLONE ACETATE 80 MG/ML
80 INJECTION, SUSPENSION INTRA-ARTICULAR; INTRALESIONAL; INTRAMUSCULAR; PARENTERAL; SOFT TISSUE ONCE
Status: COMPLETED | OUTPATIENT
Start: 2021-08-26 | End: 2021-08-26

## 2021-08-26 RX ADMIN — IOHEXOL 1 ML: 300 INJECTION, SOLUTION INTRAVENOUS at 10:51

## 2021-08-26 RX ADMIN — METHYLPREDNISOLONE ACETATE 80 MG: 80 INJECTION, SUSPENSION INTRA-ARTICULAR; INTRALESIONAL; INTRAMUSCULAR; PARENTERAL; SOFT TISSUE at 10:51

## 2021-08-26 NOTE — DISCHARGE INSTR - LAB
Epidural Steroid Injection   WHAT YOU NEED TO KNOW:   An epidural steroid injection (TITO) is a procedure to inject steroid medicine into the epidural space  The epidural space is between your spinal cord and vertebrae  Steroids reduce inflammation and fluid buildup in your spine that may be causing pain  You may be given pain medicine along with the steroids  ACTIVITY  · Do not drive or operate machinery today  · No strenuous activity today - bending, lifting, etc   · You may resume normal activites starting tomorrow - start slowly and as tolerated  · You may shower today, but no tub baths or hot tubs  · You may have numbness for several hours from the local anesthetic  Please use caution and common sense, especially with weight-bearing activities  CARE OF THE INJECTION SITE  · If you have soreness or pain, apply ice to the area today (20 minutes on/20 minutes off)  · Starting tomorrow, you may use warm, moist heat or ice if needed  · You may have an increase or change in your discomfort for 36-48 hours after your treatment  · Apply ice and continue with any pain medication you have been prescribed  · Notify [facility] if you have any of the following: redness, drainage, swelling, headache, stiff neck or fever above 100°F     SPECIAL INSTRUCTIONS  · Our office will contact you in approximately 7 days for a progress report  MEDICATIONS  · Continue to take all routine medications  · Our office may have instructed you to hold some medications  As no general anesthesia was used in today's procedure, you should not experience any side effects related to anesthesia  If you have a problem specifically related to your procedure, please call our office at [phone]  Problems not related to your procedure should be directed to your primary care physician

## 2021-08-26 NOTE — H&P
History of Present Illness: The patient is a 27 y o  male who presents with complaints of lower thoracic pain and is here today for thoracic epidural steroid injection      Patient Active Problem List   Diagnosis    Intervertebral disc disorder with radiculopathy of thoracic region    Closed wedge compression fracture of T11 vertebra with routine healing    Class 1 obesity due to excess calories without serious comorbidity with body mass index (BMI) of 30 0 to 30 9 in adult       Past Medical History:   Diagnosis Date    Asthma     Depressed        Past Surgical History:   Procedure Laterality Date    APPENDECTOMY           Current Outpatient Medications:     acetaminophen (TYLENOL) 325 mg tablet, Take 650 mg by mouth every 6 (six) hours as needed for mild pain (Patient not taking: Reported on 8/5/2021), Disp: , Rfl:     hydrOXYzine HCL (ATARAX) 50 mg tablet, Take 1 tablet (50 mg total) by mouth 3 (three) times a day as needed for itching, Disp: 30 tablet, Rfl: 0    meloxicam (MOBIC) 15 mg tablet, Take 1 tablet (15 mg total) by mouth daily, Disp: 30 tablet, Rfl: 0    methocarbamol (ROBAXIN) 750 mg tablet, Take 1 tablet (750 mg total) by mouth daily at bedtime as needed for muscle spasms, Disp: 30 tablet, Rfl: 2    venlafaxine (EFFEXOR-XR) 75 mg 24 hr capsule, Take 1 capsule (75 mg total) by mouth daily with breakfast, Disp: 30 capsule, Rfl: 5    Current Facility-Administered Medications:     iohexol (OMNIPAQUE) 300 mg/mL injection 50 mL, 50 mL, Epidural, Once, Samuel Vásquez MD    methylPREDNISolone acetate (DEPO-MEDROL) injection 80 mg, 80 mg, Epidural, Once, Samuel Vásquez MD    No Known Allergies    Physical Exam:   Vitals:    08/26/21 1039   BP: 136/90   Pulse: 67   Resp: 20   Temp: 97 6 °F (36 4 °C)   SpO2: 99%     General: Awake, Alert, Oriented x 3, Mood and affect appropriate  Respiratory: Respirations even and unlabored  Cardiovascular: Peripheral pulses intact; no edema  Musculoskeletal Exam: Lower thoracic pain    ASA Score: 1    Patient/Chart Verification  Patient ID Verified: Verbal  Consents Confirmed: To be obtained in the Pre-Procedure area  Interval H&P(within 24 hr) Complete (required for Outpatients and Surgery Admit only): To be obtained in the Pre-Procedure area  Allergies Reviewed: Yes  Anticoag/NSAID held?: Yes (meloxicam stopped on 8/20)  Currently on antibiotics?: No    Assessment:   1   Intervertebral disc disorder with radiculopathy of thoracic region        Plan: T10 interlaminar epidural steroid injection

## 2021-08-30 DIAGNOSIS — F41.9 ANXIETY: ICD-10-CM

## 2021-08-31 RX ORDER — HYDROXYZINE 50 MG/1
50 TABLET, FILM COATED ORAL 3 TIMES DAILY PRN
Qty: 30 TABLET | Refills: 0 | Status: SHIPPED | OUTPATIENT
Start: 2021-08-31 | End: 2021-09-12 | Stop reason: SDUPTHER

## 2021-09-02 ENCOUNTER — TELEPHONE (OUTPATIENT)
Dept: PAIN MEDICINE | Facility: CLINIC | Age: 31
End: 2021-09-02

## 2021-09-17 ENCOUNTER — TELEPHONE (OUTPATIENT)
Dept: PAIN MEDICINE | Facility: CLINIC | Age: 31
End: 2021-09-17

## 2021-09-17 ENCOUNTER — OFFICE VISIT (OUTPATIENT)
Dept: FAMILY MEDICINE CLINIC | Facility: CLINIC | Age: 31
End: 2021-09-17
Payer: COMMERCIAL

## 2021-09-17 VITALS
TEMPERATURE: 98.2 F | SYSTOLIC BLOOD PRESSURE: 132 MMHG | OXYGEN SATURATION: 99 % | HEIGHT: 68 IN | WEIGHT: 206.25 LBS | BODY MASS INDEX: 31.26 KG/M2 | HEART RATE: 71 BPM | DIASTOLIC BLOOD PRESSURE: 98 MMHG

## 2021-09-17 DIAGNOSIS — F51.05 INSOMNIA DUE TO OTHER MENTAL DISORDER: ICD-10-CM

## 2021-09-17 DIAGNOSIS — F40.10 SOCIAL ANXIETY DISORDER: Primary | ICD-10-CM

## 2021-09-17 DIAGNOSIS — M51.14 INTERVERTEBRAL DISC DISORDER WITH RADICULOPATHY OF THORACIC REGION: Primary | ICD-10-CM

## 2021-09-17 DIAGNOSIS — F41.1 GAD (GENERALIZED ANXIETY DISORDER): ICD-10-CM

## 2021-09-17 DIAGNOSIS — F99 INSOMNIA DUE TO OTHER MENTAL DISORDER: ICD-10-CM

## 2021-09-17 PROCEDURE — 3008F BODY MASS INDEX DOCD: CPT | Performed by: FAMILY MEDICINE

## 2021-09-17 PROCEDURE — 3725F SCREEN DEPRESSION PERFORMED: CPT | Performed by: FAMILY MEDICINE

## 2021-09-17 PROCEDURE — 1036F TOBACCO NON-USER: CPT | Performed by: FAMILY MEDICINE

## 2021-09-17 PROCEDURE — 99214 OFFICE O/P EST MOD 30 MIN: CPT | Performed by: FAMILY MEDICINE

## 2021-09-17 RX ORDER — DICLOFENAC SODIUM 75 MG/1
75 TABLET, DELAYED RELEASE ORAL 2 TIMES DAILY
Qty: 60 TABLET | Refills: 1 | Status: SHIPPED | OUTPATIENT
Start: 2021-09-17 | End: 2021-10-18

## 2021-09-17 RX ORDER — SERTRALINE HYDROCHLORIDE 100 MG/1
100 TABLET, FILM COATED ORAL DAILY
Qty: 30 TABLET | Refills: 5 | Status: SHIPPED | OUTPATIENT
Start: 2021-09-17 | End: 2022-02-11 | Stop reason: ALTCHOICE

## 2021-09-17 RX ORDER — TRAZODONE HYDROCHLORIDE 50 MG/1
50 TABLET ORAL
Qty: 30 TABLET | Refills: 5 | Status: SHIPPED | OUTPATIENT
Start: 2021-09-17 | End: 2021-10-15 | Stop reason: ALTCHOICE

## 2021-09-17 NOTE — TELEPHONE ENCOUNTER
Please have him discontinue meloxicam and methocarbamol and I will switch him to diclofenac 75 mg twice daily to help with inflammation and pain    Should be taken with a meal     Please also have him scheduled for follow-up with me for re-evaluation

## 2021-09-17 NOTE — PROGRESS NOTES
Assessment/Plan:    No problem-specific Assessment & Plan notes found for this encounter  Diagnoses and all orders for this visit:    Social anxiety disorder  Comments:  D/C effexor, start Zoloft  Orders:  -     sertraline (ZOLOFT) 100 mg tablet; Take 1 tablet (100 mg total) by mouth daily  -     Ambulatory referral to Psychology; Future    FATIMAH (generalized anxiety disorder)  -     sertraline (ZOLOFT) 100 mg tablet; Take 1 tablet (100 mg total) by mouth daily  -     Ambulatory referral to Psychology; Future    Insomnia due to other mental disorder  Comments:  Start Trazodone 1-2 tablets qhs which may explain the fatigue vs the effexor  Orders:  -     traZODone (DESYREL) 50 mg tablet; Take 1 tablet (50 mg total) by mouth daily at bedtime as needed for sleep    Other orders  -     Cancel: TDAP VACCINE GREATER THAN OR EQUAL TO 8YO IM          PHQ-9 Depression Screening    PHQ-9:   Frequency of the following problems over the past two weeks:      Little interest or pleasure in doing things: 0 - not at all  Feeling down, depressed, or hopeless: 0 - not at all  PHQ-2 Score: 0            Subjective:      Patient ID: Marcela Kinney is a 27 y o  male  Patient presents for follow up of social anxiety disorder, he does not note any improvement on the medication at all  He has felt weak for approximately 3 weeks, he was started on the effexor 4 weeks ago  He has felt weak daily for the past 3 weeks, he denies depression  He describes the weakness as he sits down and doesn't want to move  He feels as if the social anxiety is the same, no worse  He had labs done last month, thyroid normal, no anemia  He had to miss work a couple of days  He does not exercise, he does have a normal appetite  He reports, with questioning, that he has anxiety often which is worsened in social situations  Currently this is not affecting his interpersonal relationships        The following portions of the patient's history were reviewed and updated as appropriate: allergies, current medications, past family history, past medical history, past social history, past surgical history and problem list     Review of Systems   Constitutional: Positive for activity change  Negative for appetite change, chills, fatigue and fever  Neurological: Positive for weakness  Negative for dizziness and light-headedness  Psychiatric/Behavioral: Positive for sleep disturbance  Negative for dysphoric mood  The patient is nervous/anxious  He has difficulty sleeping off and on          Objective:    /98 (BP Location: Left arm, Patient Position: Sitting, Cuff Size: Standard)   Pulse 71   Temp 98 2 °F (36 8 °C) (Tympanic)   Ht 5' 8" (1 727 m)   Wt 93 6 kg (206 lb 4 oz)   SpO2 99%   BMI 31 36 kg/m²      Physical Exam  Constitutional:       General: He is not in acute distress  Appearance: Normal appearance  He is not ill-appearing or toxic-appearing  HENT:      Head: Normocephalic and atraumatic  Cardiovascular:      Rate and Rhythm: Normal rate and regular rhythm  Pulses: Normal pulses  Heart sounds: Normal heart sounds  No murmur heard  Pulmonary:      Effort: Pulmonary effort is normal       Breath sounds: Normal breath sounds  Neurological:      General: No focal deficit present  Mental Status: He is alert and oriented to person, place, and time  Cranial Nerves: No cranial nerve deficit  Sensory: No sensory deficit  Motor: No weakness  Coordination: Coordination normal    Psychiatric:         Behavior: Behavior normal          Thought Content:  Thought content normal       Comments: Flat affect

## 2021-10-15 ENCOUNTER — OFFICE VISIT (OUTPATIENT)
Dept: FAMILY MEDICINE CLINIC | Facility: CLINIC | Age: 31
End: 2021-10-15
Payer: COMMERCIAL

## 2021-10-15 VITALS
WEIGHT: 210 LBS | HEIGHT: 68 IN | HEART RATE: 83 BPM | SYSTOLIC BLOOD PRESSURE: 118 MMHG | DIASTOLIC BLOOD PRESSURE: 74 MMHG | TEMPERATURE: 97.5 F | OXYGEN SATURATION: 97 % | BODY MASS INDEX: 31.83 KG/M2

## 2021-10-15 DIAGNOSIS — F51.01 PRIMARY INSOMNIA: ICD-10-CM

## 2021-10-15 DIAGNOSIS — K04.7 DENTAL INFECTION: ICD-10-CM

## 2021-10-15 DIAGNOSIS — F40.10 SOCIAL ANXIETY DISORDER: ICD-10-CM

## 2021-10-15 DIAGNOSIS — Z23 ENCOUNTER FOR IMMUNIZATION: Primary | ICD-10-CM

## 2021-10-15 PROCEDURE — 90715 TDAP VACCINE 7 YRS/> IM: CPT

## 2021-10-15 PROCEDURE — 3008F BODY MASS INDEX DOCD: CPT | Performed by: ANESTHESIOLOGY

## 2021-10-15 PROCEDURE — 90471 IMMUNIZATION ADMIN: CPT

## 2021-10-15 PROCEDURE — 99214 OFFICE O/P EST MOD 30 MIN: CPT | Performed by: FAMILY MEDICINE

## 2021-10-15 RX ORDER — TRAZODONE HYDROCHLORIDE 50 MG/1
100 TABLET ORAL
Qty: 30 TABLET | Refills: 5 | Status: SHIPPED | OUTPATIENT
Start: 2021-10-15 | End: 2021-12-27 | Stop reason: SDUPTHER

## 2021-10-15 RX ORDER — AMOXICILLIN 500 MG/1
500 CAPSULE ORAL EVERY 8 HOURS SCHEDULED
Qty: 30 CAPSULE | Refills: 0 | Status: SHIPPED | OUTPATIENT
Start: 2021-10-15 | End: 2021-10-25

## 2021-10-15 NOTE — PROGRESS NOTES
Assessment/Plan:    No problem-specific Assessment & Plan notes found for this encounter  Diagnoses and all orders for this visit:    Encounter for immunization  -     TDAP VACCINE GREATER THAN OR EQUAL TO 6YO IM  -     Cancel: influenza vaccine, quadrivalent, 0 5 mL, preservative-free, for adult and pediatric patients 6 mos+ (AFLURIA, FLUARIX, FLULAVAL, FLUZONE)    Social anxiety disorder  Comments:  Increase zoloft by 50mg to a total daily dose of 150mg daily  Orders:  -     Discontinue: sertraline (Zoloft) 50 mg tablet; Take 1 tablet (50 mg total) by mouth daily    Primary insomnia  Comments:  Trazodone 50-100mg qhs with melatonin 4mg qhs otc  Orders:  -     traZODone (DESYREL) 50 mg tablet; Take 2 tablets (100 mg total) by mouth daily at bedtime as needed for sleep    Dental infection  -     amoxicillin (AMOXIL) 500 mg capsule; Take 1 capsule (500 mg total) by mouth every 8 (eight) hours for 10 days          PHQ-9 Depression Screening    PHQ-9:   Frequency of the following problems over the past two weeks:      Little interest or pleasure in doing things: 0 - not at all  Feeling down, depressed, or hopeless: 0 - not at all  PHQ-2 Score: 0            Subjective:      Patient ID: Edd Delatorre is a 27 y o  male  He notices a lot of improvement in his depression symptoms and anxiety  He feels he could still feel better and would like a higher dose of the zoloft  The following portions of the patient's history were reviewed and updated as appropriate: allergies, current medications, past family history, past medical history, past social history, past surgical history and problem list     Review of Systems   Constitutional: Negative  Respiratory: Negative for chest tightness and shortness of breath  Cardiovascular: Negative for chest pain and palpitations  Neurological: Negative  Psychiatric/Behavioral: Positive for sleep disturbance  Negative for dysphoric mood   The patient is not nervous/anxious  Objective:    /74 (BP Location: Left arm, Patient Position: Sitting, Cuff Size: Standard)   Pulse 83   Temp 97 5 °F (36 4 °C) (Tympanic)   Ht 5' 8" (1 727 m)   Wt 95 3 kg (210 lb)   SpO2 97%   BMI 31 93 kg/m²      Physical Exam  Constitutional:       General: He is not in acute distress  Appearance: Normal appearance  He is not ill-appearing or toxic-appearing  HENT:      Head: Normocephalic and atraumatic  Mouth/Throat:      Mouth: Mucous membranes are moist       Comments: Open hole low mid gingiva  Cardiovascular:      Rate and Rhythm: Normal rate and regular rhythm  Pulses: Normal pulses  Heart sounds: Normal heart sounds  No murmur heard  Pulmonary:      Effort: Pulmonary effort is normal       Breath sounds: Normal breath sounds  Neurological:      General: No focal deficit present  Mental Status: He is alert and oriented to person, place, and time  Cranial Nerves: No cranial nerve deficit  Sensory: No sensory deficit  Motor: No weakness  Coordination: Coordination normal    Psychiatric:         Mood and Affect: Mood normal          Behavior: Behavior normal          Thought Content:  Thought content normal          Judgment: Judgment normal

## 2021-10-18 ENCOUNTER — OFFICE VISIT (OUTPATIENT)
Dept: PAIN MEDICINE | Facility: CLINIC | Age: 31
End: 2021-10-18
Payer: COMMERCIAL

## 2021-10-18 VITALS — DIASTOLIC BLOOD PRESSURE: 96 MMHG | HEART RATE: 80 BPM | SYSTOLIC BLOOD PRESSURE: 124 MMHG

## 2021-10-18 DIAGNOSIS — M47.814 THORACIC SPONDYLOSIS: Primary | ICD-10-CM

## 2021-10-18 DIAGNOSIS — M79.18 MYOFASCIAL PAIN SYNDROME: ICD-10-CM

## 2021-10-18 PROCEDURE — 1036F TOBACCO NON-USER: CPT | Performed by: ANESTHESIOLOGY

## 2021-10-18 PROCEDURE — 99214 OFFICE O/P EST MOD 30 MIN: CPT | Performed by: ANESTHESIOLOGY

## 2021-10-18 RX ORDER — TIZANIDINE 4 MG/1
4 TABLET ORAL 2 TIMES DAILY PRN
Qty: 60 TABLET | Refills: 1 | Status: SHIPPED | OUTPATIENT
Start: 2021-10-18 | End: 2022-02-24 | Stop reason: ALTCHOICE

## 2021-10-21 ENCOUNTER — TELEPHONE (OUTPATIENT)
Dept: PAIN MEDICINE | Facility: CLINIC | Age: 31
End: 2021-10-21

## 2021-12-05 ENCOUNTER — PATIENT MESSAGE (OUTPATIENT)
Dept: FAMILY MEDICINE CLINIC | Facility: CLINIC | Age: 31
End: 2021-12-05

## 2021-12-05 DIAGNOSIS — R51.9 HEADACHE: ICD-10-CM

## 2021-12-05 DIAGNOSIS — R50.9 FEVER AND CHILLS: Primary | ICD-10-CM

## 2021-12-06 PROCEDURE — 87635 SARS-COV-2 COVID-19 AMP PRB: CPT | Performed by: FAMILY MEDICINE

## 2021-12-07 LAB — SARS-COV-2 RNA RESP QL NAA+PROBE: POSITIVE

## 2021-12-24 DIAGNOSIS — F41.9 ANXIETY: ICD-10-CM

## 2021-12-24 DIAGNOSIS — F51.01 PRIMARY INSOMNIA: ICD-10-CM

## 2021-12-24 RX ORDER — TRAZODONE HYDROCHLORIDE 50 MG/1
100 TABLET ORAL
Qty: 30 TABLET | Refills: 0 | Status: CANCELLED | OUTPATIENT
Start: 2021-12-24

## 2021-12-27 ENCOUNTER — TELEPHONE (OUTPATIENT)
Dept: FAMILY MEDICINE CLINIC | Facility: CLINIC | Age: 31
End: 2021-12-27

## 2021-12-27 RX ORDER — HYDROXYZINE 50 MG/1
50 TABLET, FILM COATED ORAL 3 TIMES DAILY PRN
Qty: 30 TABLET | Refills: 0 | Status: SHIPPED | OUTPATIENT
Start: 2021-12-27 | End: 2022-01-24

## 2022-01-04 ENCOUNTER — TELEPHONE (OUTPATIENT)
Dept: RADIOLOGY | Facility: CLINIC | Age: 32
End: 2022-01-04

## 2022-01-04 DIAGNOSIS — M51.14 INTERVERTEBRAL DISC DISORDER WITH RADICULOPATHY OF THORACIC REGION: Primary | ICD-10-CM

## 2022-01-04 RX ORDER — PREGABALIN 75 MG/1
75 CAPSULE ORAL 2 TIMES DAILY
Qty: 60 CAPSULE | Refills: 0 | Status: SHIPPED | OUTPATIENT
Start: 2022-01-04 | End: 2022-01-27 | Stop reason: SDUPTHER

## 2022-01-04 NOTE — TELEPHONE ENCOUNTER
----- Message from Abby Willard RN sent at 1/4/2022  7:33 AM EST -----  Regarding: FW: Medication  FQ, see both messages from pt from yest and advise   ----- Message -----  From: Christin Waite  Sent: 1/4/2022   3:49 AM EST  To: [facility]  Subject: Medication                                       Good morning,  I never received a call back, did Dr Yumi Blackmon choose a different medication for me?   Thank you,  Christin Waite

## 2022-01-04 NOTE — TELEPHONE ENCOUNTER
Pt called asking if the Lyrica has been sent to the [facility] on file yet- informed pt that not as of yet has any medication been sent to the pharmacy-pt is ok

## 2022-01-04 NOTE — TELEPHONE ENCOUNTER
With symptoms radiating leg pain, would recommend starting him on Lyrica 75 mg twice daily which will help with back pain as well as radiating leg pain  If amenable, I will send that medication to the pharmacy

## 2022-01-04 NOTE — TELEPHONE ENCOUNTER
S/W pt  Advised pt of the same  Pt will like to try the medication  Pt verbalized understanding  Please advise

## 2022-01-21 DIAGNOSIS — F41.9 ANXIETY: ICD-10-CM

## 2022-01-24 RX ORDER — HYDROXYZINE 50 MG/1
TABLET, FILM COATED ORAL
Qty: 30 TABLET | Refills: 0 | Status: SHIPPED | OUTPATIENT
Start: 2022-01-24 | End: 2022-02-23 | Stop reason: SDUPTHER

## 2022-01-27 ENCOUNTER — TELEPHONE (OUTPATIENT)
Dept: RADIOLOGY | Facility: CLINIC | Age: 32
End: 2022-01-27

## 2022-01-27 DIAGNOSIS — M51.14 INTERVERTEBRAL DISC DISORDER WITH RADICULOPATHY OF THORACIC REGION: ICD-10-CM

## 2022-01-27 RX ORDER — PREGABALIN 100 MG/1
100 CAPSULE ORAL 2 TIMES DAILY
Qty: 60 CAPSULE | Refills: 5 | Status: SHIPPED | OUTPATIENT
Start: 2022-01-27 | End: 2022-03-16

## 2022-01-27 NOTE — TELEPHONE ENCOUNTER
I will increase the dose of his Lyrica to 100 mg twice daily    Prescription sent to his [facility] pharmacy

## 2022-02-06 ENCOUNTER — PATIENT MESSAGE (OUTPATIENT)
Dept: FAMILY MEDICINE CLINIC | Facility: CLINIC | Age: 32
End: 2022-02-06

## 2022-02-06 DIAGNOSIS — F51.01 PRIMARY INSOMNIA: ICD-10-CM

## 2022-02-07 ENCOUNTER — TELEPHONE (OUTPATIENT)
Dept: PAIN MEDICINE | Facility: CLINIC | Age: 32
End: 2022-02-07

## 2022-02-07 RX ORDER — TRAZODONE HYDROCHLORIDE 50 MG/1
TABLET ORAL
Qty: 60 TABLET | Refills: 0 | Status: SHIPPED | OUTPATIENT
Start: 2022-02-07 | End: 2022-02-11 | Stop reason: ALTCHOICE

## 2022-02-07 RX ORDER — TRAZODONE HYDROCHLORIDE 50 MG/1
100 TABLET ORAL
Qty: 60 TABLET | Refills: 0 | Status: SHIPPED | OUTPATIENT
Start: 2022-02-07 | End: 2022-02-11 | Stop reason: ALTCHOICE

## 2022-02-11 ENCOUNTER — TELEPHONE (OUTPATIENT)
Dept: FAMILY MEDICINE CLINIC | Facility: CLINIC | Age: 32
End: 2022-02-11

## 2022-02-11 DIAGNOSIS — F51.01 PRIMARY INSOMNIA: Primary | ICD-10-CM

## 2022-02-11 RX ORDER — MIRTAZAPINE 7.5 MG/1
7.5 TABLET, FILM COATED ORAL
Qty: 30 TABLET | Refills: 5 | Status: SHIPPED | OUTPATIENT
Start: 2022-02-11 | End: 2022-03-01 | Stop reason: ALTCHOICE

## 2022-02-11 NOTE — TELEPHONE ENCOUNTER
Regarding: FW: New insurance  L/m for pt to call the office    ----- Message -----  From: Jesenia Gonzáles DO  Sent: 2/11/2022   2:43 PM EST  To: Juan Olivas MA  Subject: FW: New insurance                                Stop the trazodone, new medication sent to the pharmacy  ----- Message -----  From: Tamir Barajas MA  Sent: 2/7/2022   9:12 AM EST  To: Jesenia Gonzáles DO  Subject: New insurance                                    ----- Message from Mary Jo Ordonez sent at 2/7/2022  9:12 AM EST -----       ----- Message from Sherine Victoria to Jesenia Gonzáles DO sent at 2/6/2022 11:32 AM -----   Hello,  I have a new insurance, [employer]  identification [id number]  Is the doctor able to give me something stronger to sleep, the trazadone does not seem to be working anymore  If I need an appointment that is fine    Thank you

## 2022-02-23 DIAGNOSIS — F41.9 ANXIETY: ICD-10-CM

## 2022-02-24 RX ORDER — HYDROXYZINE 50 MG/1
25 TABLET, FILM COATED ORAL EVERY 8 HOURS PRN
Qty: 30 TABLET | Refills: 0 | Status: SHIPPED | OUTPATIENT
Start: 2022-02-24 | End: 2022-04-03 | Stop reason: SDUPTHER

## 2022-03-01 ENCOUNTER — OFFICE VISIT (OUTPATIENT)
Dept: FAMILY MEDICINE CLINIC | Facility: CLINIC | Age: 32
End: 2022-03-01
Payer: COMMERCIAL

## 2022-03-01 VITALS
DIASTOLIC BLOOD PRESSURE: 76 MMHG | TEMPERATURE: 98.4 F | HEIGHT: 68 IN | OXYGEN SATURATION: 97 % | BODY MASS INDEX: 34.8 KG/M2 | HEART RATE: 98 BPM | SYSTOLIC BLOOD PRESSURE: 132 MMHG | WEIGHT: 229.6 LBS

## 2022-03-01 DIAGNOSIS — F51.05 INSOMNIA DUE TO OTHER MENTAL DISORDER: Primary | ICD-10-CM

## 2022-03-01 DIAGNOSIS — F40.10 SOCIAL ANXIETY DISORDER: Primary | ICD-10-CM

## 2022-03-01 DIAGNOSIS — F99 INSOMNIA DUE TO OTHER MENTAL DISORDER: Primary | ICD-10-CM

## 2022-03-01 DIAGNOSIS — F40.10 SOCIAL ANXIETY DISORDER: ICD-10-CM

## 2022-03-01 DIAGNOSIS — F32.A ANXIETY AND DEPRESSION: ICD-10-CM

## 2022-03-01 DIAGNOSIS — F41.9 ANXIETY AND DEPRESSION: ICD-10-CM

## 2022-03-01 PROCEDURE — 99214 OFFICE O/P EST MOD 30 MIN: CPT | Performed by: FAMILY MEDICINE

## 2022-03-01 RX ORDER — SERTRALINE HYDROCHLORIDE 100 MG/1
100 TABLET, FILM COATED ORAL DAILY
COMMUNITY
End: 2022-03-01 | Stop reason: SDUPTHER

## 2022-03-01 RX ORDER — QUETIAPINE FUMARATE 50 MG/1
50 TABLET, FILM COATED ORAL
Qty: 30 TABLET | Refills: 1 | Status: SHIPPED | OUTPATIENT
Start: 2022-03-01 | End: 2022-04-03 | Stop reason: SDUPTHER

## 2022-03-01 RX ORDER — CLONAZEPAM 0.5 MG/1
0.5 TABLET ORAL DAILY
Qty: 30 TABLET | Refills: 0 | Status: SHIPPED | OUTPATIENT
Start: 2022-03-01 | End: 2022-03-28 | Stop reason: SDUPTHER

## 2022-03-01 NOTE — PROGRESS NOTES
Assessment/Plan:    No problem-specific Assessment & Plan notes found for this encounter  Diagnoses and all orders for this visit:    Insomnia due to other mental disorder  -     QUEtiapine (SEROquel) 50 mg tablet; Take 1 tablet (50 mg total) by mouth daily at bedtime    Social anxiety disorder  -     clonazePAM (KlonoPIN) 0 5 mg tablet; Take 1 tablet (0 5 mg total) by mouth daily    Anxiety and depression  -     clonazePAM (KlonoPIN) 0 5 mg tablet; Take 1 tablet (0 5 mg total) by mouth daily    Other orders  -     sertraline (ZOLOFT) 100 mg tablet; Take 100 mg by mouth daily          PHQ-2/9 Depression Screening    Little interest or pleasure in doing things: 0 - not at all  Feeling down, depressed, or hopeless: 0 - not at all  PHQ-2 Score: 0  PHQ-2 Interpretation: Negative depression screen            Subjective:      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: 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  Patient presents with continued difficulty sleeping, he initially did well on trazodone which no longer became effective, he trialed the Remeron which has also not been effective  He continues on the Zoloft 100 mg which she feels controls his depression  His anxiety is still a struggle for him and he reports it is rather bad  He has difficulty falling and staying asleep  He also finds he has no energy during the day  The following portions of the patient's history were reviewed and updated as appropriate: allergies, current medications, past family history, past medical history, past social history, past surgical history and problem list     Review of Systems   Constitutional: Negative  Psychiatric/Behavioral: Positive for sleep disturbance  Negative for decreased concentration and dysphoric mood  The patient is not nervous/anxious            Objective:    /76 (BP Location: Left arm, Patient Position: Sitting)   Pulse 98   Temp 98 4 °F (36 9 °C) (Tympanic)   Ht 5' 8" (1 727 m)   Wt 104 kg (229 lb 9 6 oz)   SpO2 97%   BMI 34 91 kg/m²      Physical Exam  Vitals and nursing note reviewed  Constitutional:       General: He is not in acute distress  Appearance: Normal appearance  He is not ill-appearing  Neurological:      General: No focal deficit present  Mental Status: He is alert and oriented to person, place, and time  Psychiatric:         Mood and Affect: Mood normal          Behavior: Behavior normal          Thought Content:  Thought content normal          Judgment: Judgment normal

## 2022-03-01 NOTE — PATIENT INSTRUCTIONS
Will continue Zoloft at 100 mg daily for depression and anxiety  Will add 0 5 mg of clonazepam every a m   To help control anxiety through the day  Trial Seroquel 50 mg at bedtime to help with insomnia

## 2022-03-02 RX ORDER — SERTRALINE HYDROCHLORIDE 100 MG/1
100 TABLET, FILM COATED ORAL DAILY
Qty: 90 TABLET | Refills: 1 | Status: SHIPPED | OUTPATIENT
Start: 2022-03-02 | End: 2022-04-03 | Stop reason: SDUPTHER

## 2022-03-04 ENCOUNTER — TELEPHONE (OUTPATIENT)
Dept: FAMILY MEDICINE CLINIC | Facility: CLINIC | Age: 32
End: 2022-03-04

## 2022-03-04 NOTE — TELEPHONE ENCOUNTER
Spoke to patient regarding his my chart message sent to you,  I did go over the directions and how to take his medication   I advised patient to try the klonopin for a week as he stated it was making him a little tired and advised him if it his continues to call and we will get him in sooner or dr Kiet Arredondo advise on any changes

## 2022-03-16 ENCOUNTER — OFFICE VISIT (OUTPATIENT)
Dept: PAIN MEDICINE | Facility: CLINIC | Age: 32
End: 2022-03-16
Payer: COMMERCIAL

## 2022-03-16 VITALS
SYSTOLIC BLOOD PRESSURE: 142 MMHG | HEART RATE: 92 BPM | WEIGHT: 229 LBS | BODY MASS INDEX: 34.82 KG/M2 | DIASTOLIC BLOOD PRESSURE: 95 MMHG

## 2022-03-16 DIAGNOSIS — M51.16 INTERVERTEBRAL DISC DISORDER WITH RADICULOPATHY OF LUMBAR REGION: Primary | ICD-10-CM

## 2022-03-16 PROCEDURE — 99214 OFFICE O/P EST MOD 30 MIN: CPT | Performed by: ANESTHESIOLOGY

## 2022-03-16 RX ORDER — METHYLPREDNISOLONE 4 MG/1
TABLET ORAL
Qty: 21 TABLET | Refills: 0 | Status: SHIPPED | OUTPATIENT
Start: 2022-03-16 | End: 2022-03-24

## 2022-03-16 NOTE — PROGRESS NOTES
Assessment:  1  Intervertebral disc disorder with radiculopathy of lumbar region        Plan:  The patient experiencing worsening lower back pain into the left leg with hyper reflexia on that side  At this time, I will order an MRI lumbar spine to evaluate further  I advised him I will call with the results  For now, I will place him on a Medrol Dosepak for the next 6 days to help reduce swelling and inflammation  My impressions and treatment recommendations were discussed in detail with the patient who verbalized understanding and had no further questions  Discharge instructions were provided  I personally saw and examined the patient and I agree with the above discussed plan of care  Orders Placed This Encounter   Procedures    MRI lumbar spine without contrast     Standing Status:   Future     Standing Expiration Date:   3/16/2026     Scheduling Instructions: There is no preparation for this test  Please leave your jewelry and valuables at home, wedding rings are the exception  Magnetic nail polish must be removed prior to arrival for your test  Please bring your insurance cards, a form of photo ID and a list of your medications with you  Arrive 15 minutes prior to your appointment time in order to register  Please bring any prior CT or MRI studies of this area that were not performed at a [facility] facility  To schedule this appointment, please contact Central Scheduling at [phone]  Prior to your appointment, please make sure you complete the MRI Screening Form when you e-Check in for your appointment  This will be available starting 7 days before your appointment in [address]  You may receive an e-mail with an activation code if you do not have a [employer] account  If you do not have access to a device, we will complete your screening at your appointment  Order Specific Question:   What is the patient's sedation requirement?      Answer:   No Sedation     Order Specific Question:   Release to patient through [employer]     Answer:   Immediate     Order Specific Question:   Is order priority selected as STAT? Answer:   No     Order Specific Question:   Reason for Exam (FREE TEXT)     Answer:   worsening lbp into thighs with weakness     New Medications Ordered This Visit   Medications    methylPREDNISolone 4 MG tablet therapy pack     Sig: Use as directed on package     Dispense:  21 tablet     Refill:  0       History of Present Illness:  Aurea Lopez is a 27 y o  male who presents for a follow up office visit in regards to Leg Pain and Back Pain  The patient has a history of thoracic compression fracture and thoracic spondylosis returns for follow-up he reports worsening lower back pain into the left leg with weakness over the last month  Symptoms are severe  He has had to call out of work multiple times because of the pain  He was taking Lyrica but is no longer as it was not helpful  I have personally reviewed and/or updated the patient's past medical history, past surgical history, family history, social history, current medications, allergies, and vital signs today  Review of Systems   Respiratory: Negative for shortness of breath  Cardiovascular: Negative for chest pain  Gastrointestinal: Negative for constipation, diarrhea, nausea and vomiting  Musculoskeletal: Positive for back pain, gait problem and joint swelling  Negative for arthralgias and myalgias  Skin: Negative for rash  Neurological: Positive for weakness and numbness  Negative for dizziness and seizures  All other systems reviewed and are negative        Patient Active Problem List   Diagnosis    Intervertebral disc disorder with radiculopathy of thoracic region    Closed wedge compression fracture of T11 vertebra with routine healing    Class 1 obesity due to excess calories without serious comorbidity with body mass index (BMI) of 30 0 to 30 9 in adult    Social anxiety disorder    Insomnia due to other mental disorder       Past Medical History:   Diagnosis Date    Asthma     Depressed        Past Surgical History:   Procedure Laterality Date    APPENDECTOMY         No family history on file  Social History     Occupational History    Not on file   Tobacco Use    Smoking status: Former Smoker     Packs/day: 1 00     Years: 10 00     Pack years: 10 00     Types: Cigarettes    Smokeless tobacco: Never Used   Vaping Use    Vaping Use: Never used   Substance and Sexual Activity    Alcohol use: Yes     Comment: monthly    Drug use: No    Sexual activity: Yes     Partners: Female       Current Outpatient Medications on File Prior to Visit   Medication Sig    clonazePAM (KlonoPIN) 0 5 mg tablet Take 1 tablet (0 5 mg total) by mouth daily    hydrOXYzine HCL (ATARAX) 50 mg tablet Take 0 5 tablets (25 mg total) by mouth every 8 (eight) hours as needed for itching    pregabalin (LYRICA) 100 mg capsule Take 1 capsule (100 mg total) by mouth 2 (two) times a day    QUEtiapine (SEROquel) 50 mg tablet Take 1 tablet (50 mg total) by mouth daily at bedtime    sertraline (ZOLOFT) 100 mg tablet Take 1 tablet (100 mg total) by mouth daily     No current facility-administered medications on file prior to visit  No Known Allergies    Physical Exam:    /95   Pulse 92   Wt 104 kg (229 lb)   BMI 34 82 kg/m²     Constitutional:normal, well developed, well nourished, alert, in no distress and non-toxic and no overt pain behavior    Eyes:anicteric  HEENT:grossly intact  Neck:supple, symmetric, trachea midline and no masses   Pulmonary:even and unlabored  Cardiovascular:No edema or pitting edema present  Skin:Normal without rashes or lesions and well hydrated  Psychiatric:Mood and affect appropriate  Neurologic:Cranial Nerves II-XII grossly intact  Musculoskeletal:normal     Lumbar Spine Exam  Appearance:  Normal lordosis  Palpation/Tenderness:  left lumbar paraspinal tenderness  right lumbar paraspinal tenderness  Range of Motion:  Flexion:   Moderately limited  with pain  Extension:  Moderately limited  with pain  Motor Strength:  Left hip flexion:  4/5  Left hip extension:  5/5  Right hip flexion:  5/5  Right hip extension:  5/5  Left knee flexion:  5/5  Left knee extension:  4/5  Right knee flexion:  5/5  Right knee extension:  5/5  Left foot dorsiflexion:  5/5  Left foot plantar flexion:  5/5  Right foot dorsiflexion:  5/5  Right foot plantar flexion:  5/5  Reflexes:  Left Patellar:  3+   Right Patellar:  2+   Left Achilles:  2+   Right Achilles:  2+   Special Tests:  Left Straight Leg Test:  positive  Right Straight Leg Test:  negative

## 2022-03-24 ENCOUNTER — TELEPHONE (OUTPATIENT)
Dept: PAIN MEDICINE | Facility: CLINIC | Age: 32
End: 2022-03-24

## 2022-03-24 DIAGNOSIS — G89.4 CHRONIC PAIN SYNDROME: Primary | ICD-10-CM

## 2022-03-24 DIAGNOSIS — M54.42 CHRONIC BILATERAL LOW BACK PAIN WITH BILATERAL SCIATICA: ICD-10-CM

## 2022-03-24 DIAGNOSIS — M54.41 CHRONIC BILATERAL LOW BACK PAIN WITH BILATERAL SCIATICA: ICD-10-CM

## 2022-03-24 DIAGNOSIS — G89.29 CHRONIC BILATERAL LOW BACK PAIN WITH BILATERAL SCIATICA: ICD-10-CM

## 2022-03-24 RX ORDER — DULOXETIN HYDROCHLORIDE 20 MG/1
20 CAPSULE, DELAYED RELEASE ORAL DAILY
Qty: 30 CAPSULE | Refills: 0 | Status: SHIPPED | OUTPATIENT
Start: 2022-03-24 | End: 2022-05-11 | Stop reason: SDUPTHER

## 2022-03-24 NOTE — TELEPHONE ENCOUNTER
S/w pt, advised of FQ's notation  Pt verbalized understanding and agreeable to start Cymbalta  Advised it was sent to [facility]  Pt made aware of potential for interaction with Zoloft, advised pt to call back if he experiences any symptoms of side effect  Pt verbalized understanding  Pt aware it will take several days to receive MRI results once imaging is completed, aware staff member will call with results  Pt appreciative of the call

## 2022-03-24 NOTE — TELEPHONE ENCOUNTER
Please let patient know that I am starting him on Cymbalta 20 mg daily to help with the back pain  There is a slight interaction with the Zoloft where he might get really warm or flush  I am starting a low-dose so hopefully he does not have that side effect      Will call with the results of the MRI once that has been completed

## 2022-03-28 DIAGNOSIS — F41.9 ANXIETY AND DEPRESSION: ICD-10-CM

## 2022-03-28 DIAGNOSIS — F32.A ANXIETY AND DEPRESSION: ICD-10-CM

## 2022-03-28 DIAGNOSIS — F40.10 SOCIAL ANXIETY DISORDER: ICD-10-CM

## 2022-03-29 RX ORDER — CLONAZEPAM 0.5 MG/1
0.5 TABLET ORAL DAILY
Qty: 30 TABLET | Refills: 0 | Status: SHIPPED | OUTPATIENT
Start: 2022-03-29 | End: 2022-04-03 | Stop reason: SDUPTHER

## 2022-03-30 ENCOUNTER — HOSPITAL ENCOUNTER (OUTPATIENT)
Dept: MRI IMAGING | Facility: HOSPITAL | Age: 32
Discharge: HOME | End: 2022-03-30
Payer: COMMERCIAL

## 2022-03-30 ENCOUNTER — OFFICE VISIT (OUTPATIENT)
Dept: FAMILY MEDICINE CLINIC | Facility: CLINIC | Age: 32
End: 2022-03-30
Payer: COMMERCIAL

## 2022-03-30 VITALS
HEIGHT: 68 IN | BODY MASS INDEX: 34.46 KG/M2 | OXYGEN SATURATION: 97 % | HEART RATE: 83 BPM | WEIGHT: 227.38 LBS | TEMPERATURE: 97.9 F | DIASTOLIC BLOOD PRESSURE: 80 MMHG | SYSTOLIC BLOOD PRESSURE: 128 MMHG

## 2022-03-30 DIAGNOSIS — F51.05 INSOMNIA DUE TO OTHER MENTAL DISORDER: ICD-10-CM

## 2022-03-30 DIAGNOSIS — F99 INSOMNIA DUE TO OTHER MENTAL DISORDER: ICD-10-CM

## 2022-03-30 DIAGNOSIS — M51.16 INTERVERTEBRAL DISC DISORDER WITH RADICULOPATHY OF LUMBAR REGION: ICD-10-CM

## 2022-03-30 DIAGNOSIS — M54.16 LUMBAR RADICULOPATHY: ICD-10-CM

## 2022-03-30 DIAGNOSIS — F32.A ANXIETY AND DEPRESSION: ICD-10-CM

## 2022-03-30 DIAGNOSIS — L21.9 SEBORRHEIC DERMATITIS: ICD-10-CM

## 2022-03-30 DIAGNOSIS — F40.10 SOCIAL ANXIETY DISORDER: Primary | ICD-10-CM

## 2022-03-30 DIAGNOSIS — F41.9 ANXIETY AND DEPRESSION: ICD-10-CM

## 2022-03-30 PROCEDURE — 72148 MRI LUMBAR SPINE W/O DYE: CPT

## 2022-03-30 PROCEDURE — 1036F TOBACCO NON-USER: CPT | Performed by: FAMILY MEDICINE

## 2022-03-30 PROCEDURE — G1004 CDSM NDSC: HCPCS

## 2022-03-30 PROCEDURE — 3725F SCREEN DEPRESSION PERFORMED: CPT | Performed by: FAMILY MEDICINE

## 2022-03-30 PROCEDURE — 99214 OFFICE O/P EST MOD 30 MIN: CPT | Performed by: FAMILY MEDICINE

## 2022-03-30 PROCEDURE — 3008F BODY MASS INDEX DOCD: CPT | Performed by: FAMILY MEDICINE

## 2022-03-30 RX ORDER — IBUPROFEN 600 MG/1
TABLET ORAL
COMMUNITY
Start: 2022-03-25 | End: 2022-04-13 | Stop reason: ALTCHOICE

## 2022-03-30 RX ORDER — OXYCODONE HYDROCHLORIDE AND ACETAMINOPHEN 5; 325 MG/1; MG/1
1 TABLET ORAL EVERY 6 HOURS PRN
COMMUNITY
Start: 2022-03-25 | End: 2022-04-13 | Stop reason: ALTCHOICE

## 2022-03-30 RX ORDER — AMOXICILLIN 500 MG/1
CAPSULE ORAL
COMMUNITY
Start: 2022-03-25 | End: 2022-04-13 | Stop reason: ALTCHOICE

## 2022-03-30 RX ORDER — CHLORHEXIDINE GLUCONATE 0.12 MG/ML
RINSE ORAL
COMMUNITY
Start: 2022-03-25 | End: 2022-04-13 | Stop reason: ALTCHOICE

## 2022-03-30 RX ORDER — PREDNISONE 10 MG/1
TABLET ORAL
Qty: 36 TABLET | Refills: 0 | Status: SHIPPED | OUTPATIENT
Start: 2022-03-30 | End: 2022-05-13 | Stop reason: ALTCHOICE

## 2022-03-30 RX ORDER — NYSTATIN 100000 U/G
CREAM TOPICAL 2 TIMES DAILY
Qty: 30 G | Refills: 0 | Status: SHIPPED | OUTPATIENT
Start: 2022-03-30 | End: 2022-05-13 | Stop reason: ALTCHOICE

## 2022-03-30 NOTE — PROGRESS NOTES
Assessment/Plan:    No problem-specific Assessment & Plan notes found for this encounter  Diagnoses and all orders for this visit:    Social anxiety disorder  Comments:  Doing well on current regimen, continue klonopin once daily as needed    Insomnia due to other mental disorder  Comments:  Improved on low dose seroquel    Anxiety and depression    Lumbar radiculopathy  Comments:  Continue follow up with Pain management, MRI scheduled for today  Continue cymbalta 20mg    Orders:  -     predniSONE 10 mg tablet; Take 6 tablets for 2 days, 5 tablets for 2 days, 4 tablets for 2 days, 3 the next day, 2 the following and 1 the last day with food    Seborrheic dermatitis  -     nystatin (MYCOSTATIN) cream; Apply topically 2 (two) times a day    Other orders  -     amoxicillin (AMOXIL) 500 mg capsule; take 1 capsule by mouth every 8 hours until finished  -     chlorhexidine (PERIDEX) 0 12 % solution; RINSE MOUTH WITH 15 MLS for 30 SECONDS every morning and evening      (REFER TO PRESCRIPTION NOTES)  -     ibuprofen (MOTRIN) 600 mg tablet; take 1 tablet by mouth every 6 hours with meals  -     oxyCODONE-acetaminophen (PERCOCET) 5-325 mg per tablet; Take 1 tablet by mouth every 6 (six) hours as needed          PHQ-2/9 Depression Screening    Little interest or pleasure in doing things: 0 - not at all  Feeling down, depressed, or hopeless: 0 - not at all  PHQ-2 Score: 0  PHQ-2 Interpretation: Negative depression screen            Subjective:      Patient ID: Nati Smiley is a 27 y o  male  Patient presents for follow-up with social and generalized anxiety disorder, he has been on Klonopin 0 5 mg daily, he has been requesting to have this dose increased  He continues on Zoloft 100 mg daily    He has a history of thoracic spondylosis and thoracic intervertebral disc disorder, he has been seeing Pain Management now with a complaint of severe low back pain with radiculopathy, no known injury or inciting event for which this pain has developed but Mounika states he has always stated he had LBP  He he has contacted pain management regarding what he describes as unbearable pain, he was given a Medrol Dosepak and started on duloxetine 20 mg to help with his pain, this was started at a low dose given that he is on Zoloft  Patient then contacted me requesting to be started on an opioid medication to help control his pain, it was explained mixing benzos and opioids can be a dangerous combination  He has bilateral lumbar radiculopathy and states he has always experienced that for as long as he can remember  He saw an oral surgeon on Friday who extracted his two front teeth due to infection and gave him one percocet tablet, he reports having no pain with that medication  He feels his anxiety is a little better, the addition of seroquel at bedtime has helped with sleep, he states this combination of medications is helpful overall  He reports work is going well overall  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[employer] which also helped changing from being a manager at [employer]  Denies breakthrough depression/anxiety  He also notes a beverly on his face for the past two days that he states comes and goes  He will have a follow up with PM after MRI done  The following portions of the patient's history were reviewed and updated as appropriate: allergies, current medications, past family history, past medical history, past social history, past surgical history and problem list     Review of Systems   Musculoskeletal: Positive for back pain  Neurological: Positive for numbness  Psychiatric/Behavioral: Negative for dysphoric mood and sleep disturbance  The patient is not nervous/anxious            Objective:    /80 (BP Location: Left arm, Patient Position: Sitting, Cuff Size: Large)   Pulse 83   Temp 97 9 °F (36 6 °C) (Tympanic)   Ht 5' 8" (1 727 m)   Wt 103 kg (227 lb 6 oz)   SpO2 97%   BMI 34 57 kg/m²      Physical Exam  Vitals and nursing note reviewed  Constitutional:       General: He is not in acute distress  Appearance: Normal appearance  He is not ill-appearing  Cardiovascular:      Rate and Rhythm: Normal rate and regular rhythm  Pulmonary:      Effort: Pulmonary effort is normal  No respiratory distress  Breath sounds: Normal breath sounds  No wheezing or rales  Musculoskeletal:         General: Tenderness present  Neurological:      Mental Status: He is alert and oriented to person, place, and time  Sensory: Sensory deficit present  Comments: Numbness R  Lateral thigh, normal throughout rest of legs bilaterally, Hyperreflexia on left, normal on right   Psychiatric:         Mood and Affect: Mood normal          Behavior: Behavior normal          Thought Content:  Thought content normal          Judgment: Judgment normal

## 2022-04-03 DIAGNOSIS — F51.05 INSOMNIA DUE TO OTHER MENTAL DISORDER: ICD-10-CM

## 2022-04-03 DIAGNOSIS — F41.9 ANXIETY: ICD-10-CM

## 2022-04-03 DIAGNOSIS — F41.9 ANXIETY AND DEPRESSION: ICD-10-CM

## 2022-04-03 DIAGNOSIS — F99 INSOMNIA DUE TO OTHER MENTAL DISORDER: ICD-10-CM

## 2022-04-03 DIAGNOSIS — F32.A ANXIETY AND DEPRESSION: ICD-10-CM

## 2022-04-03 DIAGNOSIS — F40.10 SOCIAL ANXIETY DISORDER: ICD-10-CM

## 2022-04-05 ENCOUNTER — TELEPHONE (OUTPATIENT)
Dept: PAIN MEDICINE | Facility: CLINIC | Age: 32
End: 2022-04-05

## 2022-04-05 DIAGNOSIS — M51.16 INTERVERTEBRAL DISC DISORDER WITH RADICULOPATHY OF LUMBAR REGION: Primary | ICD-10-CM

## 2022-04-05 RX ORDER — SERTRALINE HYDROCHLORIDE 100 MG/1
100 TABLET, FILM COATED ORAL DAILY
Qty: 90 TABLET | Refills: 0 | Status: SHIPPED | OUTPATIENT
Start: 2022-04-05 | End: 2022-05-09 | Stop reason: SDUPTHER

## 2022-04-05 RX ORDER — HYDROXYZINE 50 MG/1
25 TABLET, FILM COATED ORAL EVERY 8 HOURS PRN
Qty: 30 TABLET | Refills: 0 | Status: SHIPPED | OUTPATIENT
Start: 2022-04-05 | End: 2022-05-09 | Stop reason: SDUPTHER

## 2022-04-05 RX ORDER — CLONAZEPAM 0.5 MG/1
0.5 TABLET ORAL DAILY
Qty: 30 TABLET | Refills: 0 | Status: SHIPPED | OUTPATIENT
Start: 2022-04-05 | End: 2022-04-13 | Stop reason: ALTCHOICE

## 2022-04-05 RX ORDER — QUETIAPINE FUMARATE 50 MG/1
50 TABLET, FILM COATED ORAL
Qty: 30 TABLET | Refills: 0 | Status: SHIPPED | OUTPATIENT
Start: 2022-04-05 | End: 2022-05-09 | Stop reason: SDUPTHER

## 2022-04-05 NOTE — TELEPHONE ENCOUNTER
Discussed MRI lumbar spine results with patient which shows disc bulging at L3-4 small disc herniation at left L4-5 as well as L5-S1 on the right  Also high-intensity zone seen at the L5 disc  Recommended proceeding with LESI which he would like to do    Order placed

## 2022-04-13 ENCOUNTER — OFFICE VISIT (OUTPATIENT)
Dept: FAMILY MEDICINE CLINIC | Facility: CLINIC | Age: 32
End: 2022-04-13
Payer: COMMERCIAL

## 2022-04-13 VITALS
OXYGEN SATURATION: 97 % | BODY MASS INDEX: 32.07 KG/M2 | WEIGHT: 211.6 LBS | HEART RATE: 109 BPM | DIASTOLIC BLOOD PRESSURE: 74 MMHG | HEIGHT: 68 IN | SYSTOLIC BLOOD PRESSURE: 128 MMHG | TEMPERATURE: 98.3 F

## 2022-04-13 DIAGNOSIS — F11.20 OPIOID TYPE DEPENDENCE, EPISODIC (HCC): ICD-10-CM

## 2022-04-13 DIAGNOSIS — F51.05 INSOMNIA DUE TO OTHER MENTAL DISORDER: ICD-10-CM

## 2022-04-13 DIAGNOSIS — F99 INSOMNIA DUE TO OTHER MENTAL DISORDER: ICD-10-CM

## 2022-04-13 DIAGNOSIS — F40.10 SOCIAL ANXIETY DISORDER: Primary | ICD-10-CM

## 2022-04-13 PROCEDURE — 3725F SCREEN DEPRESSION PERFORMED: CPT | Performed by: FAMILY MEDICINE

## 2022-04-13 PROCEDURE — 3008F BODY MASS INDEX DOCD: CPT | Performed by: FAMILY MEDICINE

## 2022-04-13 PROCEDURE — 1036F TOBACCO NON-USER: CPT | Performed by: FAMILY MEDICINE

## 2022-04-13 PROCEDURE — 99214 OFFICE O/P EST MOD 30 MIN: CPT | Performed by: FAMILY MEDICINE

## 2022-04-13 RX ORDER — BUPRENORPHINE AND NALOXONE 8; 2 MG/1; MG/1
FILM, SOLUBLE BUCCAL; SUBLINGUAL
COMMUNITY
Start: 2022-04-13 | End: 2022-06-08

## 2022-04-13 NOTE — PATIENT INSTRUCTIONS
I spent 25 minutes at this visit discussing patient concerns around opioid use and depression/anxiety and a plan moving forward

## 2022-04-13 NOTE — PROGRESS NOTES
Assessment/Plan:    No problem-specific Assessment & Plan notes found for this encounter  Diagnoses and all orders for this visit:    Social anxiety disorder  Comments:  Stable on medications at current dose    Insomnia due to other mental disorder  Comments:  Seroquel helping with sleep    Opioid type dependence, episodic (HCC)  Comments: On suboxone, in counseling, Continue with [facility]    Other orders  -     buprenorphine-naloxone (Suboxone) 8-2 mg          PHQ-2/9 Depression Screening    Little interest or pleasure in doing things: 0 - not at all  Feeling down, depressed, or hopeless: 0 - not at all  PHQ-2 Score: 0  PHQ-2 Interpretation: Negative depression screen            Subjective:      Patient ID: Kaur Hyde is a 27 y o  male  Patient presents for follow up, he had an opiod problem in the past, when he was 17 he had back pain and became dependent on them  When he had his teeth removed he was given percocet and he felt he wanted more  He was taking percocet 15mg 4-5 daily  He knew this was not good for him and he did not want to go down that path so he started suboxone today twice daily through [facility]  He did experience withdrawal symptoms when he stopped the percocet  He is going to counseling at [facility] which he started yesterday  He will go weekly  The following portions of the patient's history were reviewed and updated as appropriate: allergies, current medications, past family history, past medical history, past social history, past surgical history and problem list     Review of Systems   Constitutional: Negative  Musculoskeletal: Positive for back pain  Neurological: Negative  Psychiatric/Behavioral: Negative for dysphoric mood and sleep disturbance  The patient is not nervous/anxious            Objective:    /74 (BP Location: Left arm, Patient Position: Sitting)   Pulse (!) 109   Temp 98 3 °F (36 8 °C) (Tympanic)   Ht 5' 8" (1 727 m)   Wt 96 kg (211 lb 9 6 oz) SpO2 97%   BMI 32 17 kg/m²      Physical Exam  Vitals and nursing note reviewed  Constitutional:       General: He is not in acute distress  Appearance: Normal appearance  He is not ill-appearing  Neurological:      Mental Status: He is alert  Psychiatric:         Mood and Affect: Mood normal          Behavior: Behavior normal          Thought Content:  Thought content normal          Judgment: Judgment normal

## 2022-05-09 ENCOUNTER — TELEPHONE (OUTPATIENT)
Dept: PAIN MEDICINE | Facility: CLINIC | Age: 32
End: 2022-05-09

## 2022-05-09 DIAGNOSIS — M54.41 CHRONIC BILATERAL LOW BACK PAIN WITH BILATERAL SCIATICA: ICD-10-CM

## 2022-05-09 DIAGNOSIS — F99 INSOMNIA DUE TO OTHER MENTAL DISORDER: ICD-10-CM

## 2022-05-09 DIAGNOSIS — F51.05 INSOMNIA DUE TO OTHER MENTAL DISORDER: ICD-10-CM

## 2022-05-09 DIAGNOSIS — G89.29 CHRONIC BILATERAL LOW BACK PAIN WITH BILATERAL SCIATICA: ICD-10-CM

## 2022-05-09 DIAGNOSIS — F40.10 SOCIAL ANXIETY DISORDER: ICD-10-CM

## 2022-05-09 DIAGNOSIS — F41.9 ANXIETY: ICD-10-CM

## 2022-05-09 DIAGNOSIS — M54.42 CHRONIC BILATERAL LOW BACK PAIN WITH BILATERAL SCIATICA: ICD-10-CM

## 2022-05-09 RX ORDER — DULOXETIN HYDROCHLORIDE 20 MG/1
20 CAPSULE, DELAYED RELEASE ORAL DAILY
Qty: 30 CAPSULE | Refills: 0 | Status: CANCELLED | OUTPATIENT
Start: 2022-05-09

## 2022-05-11 DIAGNOSIS — M54.41 CHRONIC BILATERAL LOW BACK PAIN WITH BILATERAL SCIATICA: ICD-10-CM

## 2022-05-11 DIAGNOSIS — G89.29 CHRONIC BILATERAL LOW BACK PAIN WITH BILATERAL SCIATICA: ICD-10-CM

## 2022-05-11 DIAGNOSIS — M54.42 CHRONIC BILATERAL LOW BACK PAIN WITH BILATERAL SCIATICA: ICD-10-CM

## 2022-05-11 RX ORDER — DULOXETIN HYDROCHLORIDE 20 MG/1
20 CAPSULE, DELAYED RELEASE ORAL DAILY
Qty: 90 CAPSULE | Refills: 1 | Status: SHIPPED | OUTPATIENT
Start: 2022-05-11 | End: 2022-05-11

## 2022-05-11 RX ORDER — DULOXETIN HYDROCHLORIDE 20 MG/1
CAPSULE, DELAYED RELEASE ORAL
Qty: 90 CAPSULE | Refills: 1 | Status: SHIPPED | OUTPATIENT
Start: 2022-05-11 | End: 2022-05-13 | Stop reason: ALTCHOICE

## 2022-05-11 NOTE — TELEPHONE ENCOUNTER
Pt called in to check on the status of his medication refill  Please be advised thank you    Pt can be contacted @ [phone]

## 2022-05-11 NOTE — TELEPHONE ENCOUNTER
[facility] on the phone for PT The pt is on sertraline (ZOLOFT) 100 mg tablet and Dr Yesi Baer prescribed DULoxetine (CYMBALTA) 20 mg capsule             Please be advised thank you    Pt can be contacted @ [phone]

## 2022-05-11 NOTE — TELEPHONE ENCOUNTER
Pt requesting RF of Duloxetine 20 mg daily  Pt doing well on current dose and denies s/e  Pt said he just started a new job and works 70 hours a week so he is going to call back when he has time to make another appt

## 2022-05-13 ENCOUNTER — HOSPITAL ENCOUNTER (EMERGENCY)
Facility: HOSPITAL | Age: 32
Discharge: HOME | End: 2022-05-13
Attending: EMERGENCY MEDICINE
Payer: COMMERCIAL

## 2022-05-13 ENCOUNTER — OFFICE VISIT (OUTPATIENT)
Dept: FAMILY MEDICINE CLINIC | Facility: CLINIC | Age: 32
End: 2022-05-13
Payer: COMMERCIAL

## 2022-05-13 ENCOUNTER — APPOINTMENT (EMERGENCY)
Dept: CT IMAGING | Facility: HOSPITAL | Age: 32
End: 2022-05-13
Payer: COMMERCIAL

## 2022-05-13 VITALS
BODY MASS INDEX: 32.58 KG/M2 | SYSTOLIC BLOOD PRESSURE: 127 MMHG | DIASTOLIC BLOOD PRESSURE: 77 MMHG | HEIGHT: 68 IN | WEIGHT: 215 LBS | RESPIRATION RATE: 20 BRPM | HEART RATE: 63 BPM | TEMPERATURE: 97.2 F | OXYGEN SATURATION: 99 %

## 2022-05-13 VITALS
WEIGHT: 212.2 LBS | TEMPERATURE: 96.8 F | SYSTOLIC BLOOD PRESSURE: 128 MMHG | DIASTOLIC BLOOD PRESSURE: 76 MMHG | HEIGHT: 68 IN | BODY MASS INDEX: 32.16 KG/M2 | OXYGEN SATURATION: 99 % | HEART RATE: 71 BPM

## 2022-05-13 DIAGNOSIS — R33.9 URINARY RETENTION WITH INCOMPLETE BLADDER EMPTYING: Primary | ICD-10-CM

## 2022-05-13 DIAGNOSIS — R10.30 LOWER ABDOMINAL PAIN: ICD-10-CM

## 2022-05-13 DIAGNOSIS — R61 EXCESSIVE SWEATING: ICD-10-CM

## 2022-05-13 DIAGNOSIS — K59.00 CONSTIPATION, UNSPECIFIED CONSTIPATION TYPE: ICD-10-CM

## 2022-05-13 DIAGNOSIS — N30.90 CYSTITIS: Primary | ICD-10-CM

## 2022-05-13 DIAGNOSIS — H53.8 BLURRY VISION, BILATERAL: ICD-10-CM

## 2022-05-13 DIAGNOSIS — R11.0 NAUSEA: ICD-10-CM

## 2022-05-13 LAB
ALBUMIN SERPL BCP-MCNC: 4.9 G/DL (ref 3.5–5)
ALP SERPL-CCNC: 69 U/L (ref 34–104)
ALT SERPL W P-5'-P-CCNC: 43 U/L (ref 7–52)
ANION GAP SERPL CALCULATED.3IONS-SCNC: 6 MMOL/L (ref 4–13)
AST SERPL W P-5'-P-CCNC: 31 U/L (ref 13–39)
BASOPHILS # BLD AUTO: 0.02 THOUSANDS/ΜL (ref 0–0.1)
BASOPHILS NFR BLD AUTO: 0 % (ref 0–1)
BILIRUB SERPL-MCNC: 0.96 MG/DL (ref 0.2–1)
BILIRUB UR QL STRIP: NEGATIVE
BUN SERPL-MCNC: 14 MG/DL (ref 5–25)
CALCIUM SERPL-MCNC: 9.5 MG/DL (ref 8.4–10.2)
CHLORIDE SERPL-SCNC: 102 MMOL/L (ref 96–108)
CLARITY UR: ABNORMAL
CO2 SERPL-SCNC: 29 MMOL/L (ref 21–32)
COLOR UR: YELLOW
CREAT SERPL-MCNC: 0.95 MG/DL (ref 0.6–1.3)
EOSINOPHIL # BLD AUTO: 0.04 THOUSAND/ΜL (ref 0–0.61)
EOSINOPHIL NFR BLD AUTO: 1 % (ref 0–6)
ERYTHROCYTE [DISTWIDTH] IN BLOOD BY AUTOMATED COUNT: 11.9 % (ref 11.6–15.1)
GFR SERPL CREATININE-BSD FRML MDRD: 105 ML/MIN/1.73SQ M
GLUCOSE SERPL-MCNC: 107 MG/DL (ref 65–140)
GLUCOSE UR STRIP-MCNC: NEGATIVE MG/DL
HCT VFR BLD AUTO: 53.4 % (ref 36.5–49.3)
HGB BLD-MCNC: 17.6 G/DL (ref 12–17)
HGB UR QL STRIP.AUTO: NEGATIVE
IMM GRANULOCYTES # BLD AUTO: 0.01 THOUSAND/UL (ref 0–0.2)
IMM GRANULOCYTES NFR BLD AUTO: 0 % (ref 0–2)
KETONES UR STRIP-MCNC: ABNORMAL MG/DL
LEUKOCYTE ESTERASE UR QL STRIP: NEGATIVE
LIPASE SERPL-CCNC: 17 U/L (ref 11–82)
LYMPHOCYTES # BLD AUTO: 1.01 THOUSANDS/ΜL (ref 0.6–4.47)
LYMPHOCYTES NFR BLD AUTO: 15 % (ref 14–44)
MCH RBC QN AUTO: 27.6 PG (ref 26.8–34.3)
MCHC RBC AUTO-ENTMCNC: 33 G/DL (ref 31.4–37.4)
MCV RBC AUTO: 84 FL (ref 82–98)
MONOCYTES # BLD AUTO: 0.26 THOUSAND/ΜL (ref 0.17–1.22)
MONOCYTES NFR BLD AUTO: 4 % (ref 4–12)
NEUTROPHILS # BLD AUTO: 5.27 THOUSANDS/ΜL (ref 1.85–7.62)
NEUTS SEG NFR BLD AUTO: 80 % (ref 43–75)
NITRITE UR QL STRIP: NEGATIVE
NRBC BLD AUTO-RTO: 0 /100 WBCS
PH UR STRIP.AUTO: 7 [PH]
PLATELET # BLD AUTO: 200 THOUSANDS/UL (ref 149–390)
PMV BLD AUTO: 9.3 FL (ref 8.9–12.7)
POTASSIUM SERPL-SCNC: 5 MMOL/L (ref 3.5–5.3)
PROT SERPL-MCNC: 7 G/DL (ref 6.4–8.4)
PROT UR STRIP-MCNC: NEGATIVE MG/DL
RBC # BLD AUTO: 6.37 MILLION/UL (ref 3.88–5.62)
SODIUM SERPL-SCNC: 137 MMOL/L (ref 135–147)
SP GR UR STRIP.AUTO: 1.02 (ref 1–1.03)
UROBILINOGEN UR QL STRIP.AUTO: 0.2 E.U./DL
WBC # BLD AUTO: 6.61 THOUSAND/UL (ref 4.31–10.16)

## 2022-05-13 PROCEDURE — 99284 EMERGENCY DEPT VISIT MOD MDM: CPT | Performed by: PHYSICIAN ASSISTANT

## 2022-05-13 PROCEDURE — 81003 URINALYSIS AUTO W/O SCOPE: CPT | Performed by: PHYSICIAN ASSISTANT

## 2022-05-13 PROCEDURE — 83690 ASSAY OF LIPASE: CPT | Performed by: PHYSICIAN ASSISTANT

## 2022-05-13 PROCEDURE — 96376 TX/PRO/DX INJ SAME DRUG ADON: CPT

## 2022-05-13 PROCEDURE — 96374 THER/PROPH/DIAG INJ IV PUSH: CPT

## 2022-05-13 PROCEDURE — 36415 COLL VENOUS BLD VENIPUNCTURE: CPT | Performed by: PHYSICIAN ASSISTANT

## 2022-05-13 PROCEDURE — 96375 TX/PRO/DX INJ NEW DRUG ADDON: CPT

## 2022-05-13 PROCEDURE — 87591 N.GONORRHOEAE DNA AMP PROB: CPT | Performed by: PHYSICIAN ASSISTANT

## 2022-05-13 PROCEDURE — 85025 COMPLETE CBC W/AUTO DIFF WBC: CPT | Performed by: PHYSICIAN ASSISTANT

## 2022-05-13 PROCEDURE — 87491 CHLMYD TRACH DNA AMP PROBE: CPT | Performed by: PHYSICIAN ASSISTANT

## 2022-05-13 PROCEDURE — 99284 EMERGENCY DEPT VISIT MOD MDM: CPT

## 2022-05-13 PROCEDURE — 99214 OFFICE O/P EST MOD 30 MIN: CPT | Performed by: FAMILY MEDICINE

## 2022-05-13 PROCEDURE — 74176 CT ABD & PELVIS W/O CONTRAST: CPT

## 2022-05-13 PROCEDURE — 80053 COMPREHEN METABOLIC PANEL: CPT | Performed by: PHYSICIAN ASSISTANT

## 2022-05-13 RX ORDER — NALOXONE HYDROCHLORIDE 4 MG/.1ML
SPRAY NASAL
COMMUNITY
Start: 2022-04-25

## 2022-05-13 RX ORDER — SULFAMETHOXAZOLE AND TRIMETHOPRIM 800; 160 MG/1; MG/1
1 TABLET ORAL ONCE
Status: COMPLETED | OUTPATIENT
Start: 2022-05-13 | End: 2022-05-13

## 2022-05-13 RX ORDER — PHENAZOPYRIDINE HYDROCHLORIDE 100 MG/1
100 TABLET, FILM COATED ORAL 3 TIMES DAILY PRN
Qty: 10 TABLET | Refills: 0 | Status: SHIPPED | OUTPATIENT
Start: 2022-05-13 | End: 2022-06-08

## 2022-05-13 RX ORDER — PHENAZOPYRIDINE HYDROCHLORIDE 100 MG/1
100 TABLET, FILM COATED ORAL ONCE
Status: COMPLETED | OUTPATIENT
Start: 2022-05-13 | End: 2022-05-13

## 2022-05-13 RX ORDER — ACETAMINOPHEN 325 MG/1
975 TABLET ORAL ONCE
Status: COMPLETED | OUTPATIENT
Start: 2022-05-13 | End: 2022-05-13

## 2022-05-13 RX ORDER — KETOROLAC TROMETHAMINE 30 MG/ML
15 INJECTION, SOLUTION INTRAMUSCULAR; INTRAVENOUS ONCE
Status: COMPLETED | OUTPATIENT
Start: 2022-05-13 | End: 2022-05-13

## 2022-05-13 RX ORDER — ONDANSETRON 2 MG/ML
4 INJECTION INTRAMUSCULAR; INTRAVENOUS ONCE
Status: COMPLETED | OUTPATIENT
Start: 2022-05-13 | End: 2022-05-13

## 2022-05-13 RX ORDER — ONDANSETRON 4 MG/1
4 TABLET, ORALLY DISINTEGRATING ORAL EVERY 6 HOURS PRN
Qty: 12 TABLET | Refills: 0 | Status: SHIPPED | OUTPATIENT
Start: 2022-05-13 | End: 2022-06-08

## 2022-05-13 RX ORDER — SULFAMETHOXAZOLE AND TRIMETHOPRIM 800; 160 MG/1; MG/1
1 TABLET ORAL 2 TIMES DAILY
Qty: 6 TABLET | Refills: 0 | Status: SHIPPED | OUTPATIENT
Start: 2022-05-13 | End: 2022-05-16

## 2022-05-13 RX ADMIN — ONDANSETRON 4 MG: 2 INJECTION INTRAMUSCULAR; INTRAVENOUS at 16:22

## 2022-05-13 RX ADMIN — PHENAZOPYRIDINE 100 MG: 100 TABLET ORAL at 16:12

## 2022-05-13 RX ADMIN — ACETAMINOPHEN 975 MG: 325 TABLET ORAL at 16:12

## 2022-05-13 RX ADMIN — ONDANSETRON 4 MG: 2 INJECTION INTRAMUSCULAR; INTRAVENOUS at 13:42

## 2022-05-13 RX ADMIN — KETOROLAC TROMETHAMINE 15 MG: 30 INJECTION, SOLUTION INTRAMUSCULAR at 13:42

## 2022-05-13 RX ADMIN — SULFAMETHOXAZOLE AND TRIMETHOPRIM 1 TABLET: 800; 160 TABLET ORAL at 16:12

## 2022-05-13 NOTE — PROGRESS NOTES
Assessment/Plan:      Diagnoses and all orders for this visit:    Urinary retention with incomplete bladder emptying    Lower abdominal pain    Nausea    Excessive sweating    Blurry vision, bilateral    Constipation, unspecified constipation type    Other orders  -     naloxone (NARCAN) 4 mg/0 1 mL nasal spray; INSTILL 1 SPRAY INTO EACH NOSTRIL FOR OVERDOSE as directed      All new symptoms in last week  Concern about potential medication side effects such as suboxone vs seroquel  Was only taking 50 mg nightly for insomnia but concerns given constellation of symptoms which could all be side effects of the medication apart from the excessive sweating  Check for urinary retention and cause of abdominal pain with US vs CT  Advised patient for ED given he may need catheterization  Suboxone dose being too high for him potentially could be leading to symptoms as well including the sweating but not necessarily the trouble with urination  Return for Next scheduled follow up with pcp  The following portions of the patient's history were reviewed and updated as appropriate: allergies, current medications, past family history, past medical history, past social history, past surgical history, and problem list      Subjective:     Patient ID: Janey Crocker is a 27 y o  male  HPI       Reports one week of abdominal pain, sweating, nausea without vomiting, trouble urination,constipation, blurry vision, rhinorrhea  He feels like he needs to go but he cant  Reports when he pushes really hard he can go  Reports his abdominal pain is suprapubic and periumbical  Reports constant, sharp pain  No improvement with urination  Reports is going a normal amount when he does urinate  Reports only potential triggers was medication was seroquel, hydroxyzine and sertraline  He had stopped cymbalta a week prior to that  Reports was on it for only a few weeks but was on both the sertraline and the cymbalta together for a few weeks  Was taking maybe 2-3 times a week only  Reports never had any of these symptoms previously  Denies blood with urination  No diarrhea  Reports constipation for about one week- two weeks  Taking mylanta for it  Reports is helping with nausea only  No sick contacts at home or work  No travel or antibiotics recently  Reports no outside foods last week  Denies drug use  Has been on suboxone for 1 month  Taking 4 half stripes a day  Last use was 6 months ago  Reports he use to get prescription pain meds and it became a problem and he didn't want to stay on them or rely on them so decided to eventually start suboxone  PHQ-9 Depression Screening    Little interest or pleasure in doing things: 0 - not at all  Feeling down, depressed, or hopeless: 0 - not at all          Current Outpatient Medications on File Prior to Visit   Medication Sig Dispense Refill    buprenorphine-naloxone (Suboxone) 8-2 mg       hydrOXYzine HCL (ATARAX) 50 mg tablet Take 0 5 tablets (25 mg total) by mouth every 8 (eight) hours as needed for itching 30 tablet 0    naloxone (NARCAN) 4 mg/0 1 mL nasal spray INSTILL 1 SPRAY INTO EACH NOSTRIL FOR OVERDOSE as directed      QUEtiapine (SEROquel) 50 mg tablet Take 1 tablet (50 mg total) by mouth daily at bedtime 30 tablet 0    sertraline (ZOLOFT) 100 mg tablet Take 1 tablet (100 mg total) by mouth daily 90 tablet 0    [DISCONTINUED] DULoxetine (CYMBALTA) 20 mg capsule take 1 capsule by mouth every morning (Patient not taking: No sig reported) 90 capsule 1    [DISCONTINUED] nystatin (MYCOSTATIN) cream Apply topically 2 (two) times a day (Patient not taking: No sig reported) 30 g 0    [DISCONTINUED] predniSONE 10 mg tablet Take 6 tablets for 2 days, 5 tablets for 2 days, 4 tablets for 2 days, 3 the next day, 2 the following and 1 the last day with food (Patient not taking: No sig reported) 36 tablet 0     No current facility-administered medications on file prior to visit  Review of Systems         Objective:    Vitals:    05/13/22 1040   BP: 128/76   BP Location: Left arm   Patient Position: Sitting   Pulse: 71   Temp: (!) 96 8 °F (36 °C)   TempSrc: Tympanic   SpO2: 99%   Weight: 96 3 kg (212 lb 3 2 oz)   Height: 5' 8" (1 727 m)         Physical Exam  Vitals and nursing note reviewed  Constitutional:       General: He is not in acute distress  Appearance: Normal appearance  He is diaphoretic (wet clothing and sweat dripping from scalp and forehead)  He is not ill-appearing or toxic-appearing  Comments: Rigors as well   HENT:      Head: Normocephalic and atraumatic  Nose: Nose normal       Mouth/Throat:      Mouth: Mucous membranes are moist       Pharynx: Oropharynx is clear  No oropharyngeal exudate or posterior oropharyngeal erythema  Eyes:      General: No scleral icterus  Extraocular Movements: Extraocular movements intact  Conjunctiva/sclera: Conjunctivae normal       Comments: Pupils did not constrict as expected bilaterally   Cardiovascular:      Rate and Rhythm: Normal rate and regular rhythm  Heart sounds: Normal heart sounds  No murmur heard  No friction rub  No gallop  Pulmonary:      Effort: Pulmonary effort is normal  No respiratory distress  Breath sounds: Normal breath sounds  No wheezing or rales  Abdominal:      General: Bowel sounds are decreased  There is no distension  Palpations: Abdomen is soft  Tenderness: There is abdominal tenderness in the right lower quadrant, periumbilical area and suprapubic area  There is no guarding or rebound  Negative signs include Peraza's sign and McBurney's sign  Neurological:      Mental Status: He is alert

## 2022-05-13 NOTE — Clinical Note
Alexx Mccormick was seen and treated in our emergency department on 5/13/2022  Diagnosis: Cystitis    Arielle Hams    He may return on this date: 05/15/2022         If you have any questions or concerns, please don't hesitate to call        Washington Bailey PA-C    ______________________________           _______________          _______________  Hospital Representative                              Date                                Time

## 2022-05-13 NOTE — ED PROVIDER NOTES
History  Chief Complaint   Patient presents with    Abdominal Pain    Urinary Retention     56-year-old male history of asthma, depression and chronic pain on Suboxone presents complaining of abdominal pain  Patient reports intermittent cramping abdominal pain for the past week primarily located in his suprapubic region with associated nausea  Patient reports that it is a sharp intermittent nonradiating moderate to severe pain  Patient also reports that his pain is increased when trying to void his bladder  He denies any dysuria, hematuria, penile discharge, testicular swelling or any concern for STIs  He denies any fevers but does report diaphoresis this morning that is associated with his pain  Saw his PCP that recommended he go to the ED for evaluation  He denies any chest pain, shortness of breath, vomiting, diarrhea, hematochezia or any other complaints or concerns at this time  Prior to Admission Medications   Prescriptions Last Dose Informant Patient Reported? Taking? QUEtiapine (SEROquel) 50 mg tablet   No No   Sig: Take 1 tablet (50 mg total) by mouth daily at bedtime   buprenorphine-naloxone (Suboxone) 8-2 mg   Yes No   hydrOXYzine HCL (ATARAX) 50 mg tablet   No No   Sig: Take 0 5 tablets (25 mg total) by mouth every 8 (eight) hours as needed for itching   naloxone (NARCAN) 4 mg/0 1 mL nasal spray   Yes No   Sig: INSTILL 1 SPRAY INTO EACH NOSTRIL FOR OVERDOSE as directed   sertraline (ZOLOFT) 100 mg tablet   No No   Sig: Take 1 tablet (100 mg total) by mouth daily      Facility-Administered Medications: None       Past Medical History:   Diagnosis Date    Asthma     Depressed        Past Surgical History:   Procedure Laterality Date    APPENDECTOMY      DENTAL SURGERY  03/25/2022       History reviewed  No pertinent family history  I have reviewed and agree with the history as documented      E-Cigarette/Vaping    E-Cigarette Use Never User      E-Cigarette/Vaping Substances    Nicotine No     THC No     CBD No     Flavoring No     Other No     Unknown No      Social History     Tobacco Use    Smoking status: Former Smoker     Packs/day: 1 00     Years: 10 00     Pack years: 10 00     Types: Cigarettes    Smokeless tobacco: Never Used   Vaping Use    Vaping Use: Never used   Substance Use Topics    Alcohol use: Yes     Comment: monthly    Drug use: No       Review of Systems   Constitutional: Negative for chills, fatigue and fever  HENT: Negative for congestion and sore throat  Eyes: Negative for pain  Respiratory: Negative for cough, chest tightness, shortness of breath and wheezing  Cardiovascular: Negative for chest pain, palpitations and leg swelling  Gastrointestinal: Positive for abdominal pain  Negative for constipation, diarrhea, nausea and vomiting  Endocrine: Negative for polyuria  Genitourinary: Positive for difficulty urinating  Negative for dysuria  Musculoskeletal: Negative for arthralgias, back pain, myalgias and neck pain  Skin: Negative for rash  Neurological: Negative for dizziness, syncope, light-headedness and headaches  All other systems reviewed and are negative  Physical Exam  Physical Exam  Vitals reviewed  Constitutional:       Appearance: Normal appearance  He is well-developed  He is ill-appearing  HENT:      Head: Normocephalic and atraumatic  Mouth/Throat:      Mouth: Mucous membranes are moist    Eyes:      Conjunctiva/sclera: Conjunctivae normal    Cardiovascular:      Rate and Rhythm: Normal rate and regular rhythm  Heart sounds: Normal heart sounds  Pulmonary:      Effort: Pulmonary effort is normal       Breath sounds: Normal breath sounds  Abdominal:      General: Bowel sounds are normal       Palpations: Abdomen is soft  Tenderness: There is abdominal tenderness in the suprapubic area  Musculoskeletal:         General: Normal range of motion  Cervical back: Normal range of motion  Skin:     General: Skin is warm and dry  Capillary Refill: Capillary refill takes less than 2 seconds  Neurological:      General: No focal deficit present  Mental Status: He is alert and oriented to person, place, and time     Psychiatric:         Mood and Affect: Mood normal          Behavior: Behavior normal          Vital Signs  ED Triage Vitals [05/13/22 1256]   Temperature Pulse Respirations Blood Pressure SpO2   (!) 97 2 °F (36 2 °C) 63 20 127/77 99 %      Temp Source Heart Rate Source Patient Position - Orthostatic VS BP Location FiO2 (%)   Temporal Monitor Sitting Right arm --      Pain Score       10 - Worst Possible Pain           Vitals:    05/13/22 1256   BP: 127/77   Pulse: 63   Patient Position - Orthostatic VS: Sitting         Visual Acuity      ED Medications  Medications   ondansetron (ZOFRAN) injection 4 mg (4 mg Intravenous Given 5/13/22 1342)   ketorolac (TORADOL) injection 15 mg (15 mg Intravenous Given 5/13/22 1342)   phenazopyridine (PYRIDIUM) tablet 100 mg (100 mg Oral Given 5/13/22 1612)   acetaminophen (TYLENOL) tablet 975 mg (975 mg Oral Given 5/13/22 1612)   sulfamethoxazole-trimethoprim (BACTRIM DS) 800-160 mg per tablet 1 tablet (1 tablet Oral Given 5/13/22 1612)   ondansetron (ZOFRAN) injection 4 mg (4 mg Intravenous Given 5/13/22 1622)       Diagnostic Studies  Results Reviewed     Procedure Component Value Units Date/Time    [facility] [[id number]] Collected: 05/13/22 1337    Lab Status: Final result Specimen: Blood from Arm, Left Updated: 05/13/22 1409     Sodium 137 mmol/L      Potassium 5 0 mmol/L      Chloride 102 mmol/L      CO2 29 mmol/L      ANION GAP 6 mmol/L      BUN 14 mg/dL      Creatinine 0 95 mg/dL      Glucose 107 mg/dL      Calcium 9 5 mg/dL      AST 31 U/L      ALT 43 U/L      Alkaline Phosphatase 69 U/L      Total Protein 7 0 g/dL      Albumin 4 9 g/dL      Total Bilirubin 0 96 mg/dL      eGFR 105 ml/min/1 73sq m     Narrative:      National Kidney Disease Foundation guidelines for Chronic Kidney Disease (CKD):     Stage 1 with normal or high GFR (GFR > 90 mL/min/1 73 square meters)    Stage 2 Mild CKD (GFR = 60-89 mL/min/1 73 square meters)    Stage 3A Moderate CKD (GFR = 45-59 mL/min/1 73 square meters)    Stage 3B Moderate CKD (GFR = 30-44 mL/min/1 73 square meters)    Stage 4 Severe CKD (GFR = 15-29 mL/min/1 73 square meters)    Stage 5 End Stage CKD (GFR <15 mL/min/1 73 square meters)  Note: GFR calculation is accurate only with a steady state creatinine    Lipase [[id number]]  (Normal) Collected: 05/13/22 1337    Lab Status: Final result Specimen: Blood from Arm, Left Updated: 05/13/22 1409     Lipase 17 u/L     CBC and differential [[id number]]  (Abnormal) Collected: 05/13/22 1337    Lab Status: Final result Specimen: Blood from Arm, Left Updated: 05/13/22 1345     WBC 6 61 Thousand/uL      RBC 6 37 Million/uL      Hemoglobin 17 6 g/dL      Hematocrit 53 4 %      MCV 84 fL      MCH 27 6 pg      MCHC 33 0 g/dL      RDW 11 9 %      MPV 9 3 fL      Platelets 230 Thousands/uL      nRBC 0 /100 WBCs      Neutrophils Relative 80 %      Immat GRANS % 0 %      Lymphocytes Relative 15 %      Monocytes Relative 4 %      Eosinophils Relative 1 %      Basophils Relative 0 %      Neutrophils Absolute 5 27 Thousands/µL      Immature Grans Absolute 0 01 Thousand/uL      Lymphocytes Absolute 1 01 Thousands/µL      Monocytes Absolute 0 26 Thousand/µL      Eosinophils Absolute 0 04 Thousand/µL      Basophils Absolute 0 02 Thousands/µL     UA w Reflex to Microscopic w Reflex to Culture [[id number]]  (Abnormal) Collected: 05/13/22 1336    Lab Status: Final result Specimen: Urine, Clean Catch Updated: 05/13/22 1345     Color, UA Yellow     Clarity, UA Hazy     Specific [place], UA 1 020     pH, UA 7 0     Leukocytes, UA Negative     Nitrite, UA Negative     Protein, UA Negative mg/dl      Glucose, UA Negative mg/dl      Ketones, UA Trace mg/dl      Urobilinogen, UA 0 2 E U /dl Bilirubin, UA Negative     Blood, UA Negative    Chlamydia/GC amplified DNA by PCR [[id number]] Collected: 05/13/22 1337    Lab Status: In process Specimen: Urine, Other Updated: 05/13/22 1340                 CT abdomen pelvis wo contrast   Final Result by Lorenzo Smart MD (05/13 1444)      1  No acute findings in the abdomen or pelvis  2   Diffuse bladder wall thickening consistent with sequela of chronic cystitis and/or chronic outlet obstruction  3   Punctate nonobstructive bilateral renal calculi  The study was marked in EPIC for significant notification  Workstation performed: [id number]                    Procedures  Procedures         ED Course                               SBIRT 20yo+    Flowsheet Row Most Recent Value   SBIRT (25 yo +)    In order to provide better care to our patients, we are screening all of our patients for alcohol and drug use  Would it be okay to ask you these screening questions? Yes Filed at: 05/13/2022 1448   Initial Alcohol Screen: US AUDIT-C     1  How often do you have a drink containing alcohol? 0 Filed at: 05/13/2022 1448   2  How many drinks containing alcohol do you have on a typical day you are drinking? 0 Filed at: 05/13/2022 1448   3a  Male UNDER 65: How often do you have five or more drinks on one occasion? 0 Filed at: 05/13/2022 1448   3b  FEMALE Any Age, or MALE 65+: How often do you have 4 or more drinks on one occassion? 0 Filed at: 05/13/2022 1448   Audit-C Score 0 Filed at: 05/13/2022 1448   OSIRIS: How many times in the past year have you    Used an illegal drug or used a prescription medication for non-medical reasons? Never Filed at: 05/13/2022 1448                    MDM  Number of Diagnoses or Management Options  Cystitis  Diagnosis management comments: Patient presented with concerns of suprapubic tenderness    Urinalysis not consistent with UTI however patient is somewhat symptomatic with CT findings consistent with cystitis  Discussed case with Dr Keira Topete of Urology  Will treat with short course of antibiotics and follow-up on culture  Patient's symptoms improved in the ED  Ambulatory referral to Urology provided  Return precautions were advised  Patient's vitals remained stable throughout his stay in the ED  He was somewhat uncomfortable appearing but clinically nontoxic  All of his questions were answered and he was agreeable to plan  Portions of the record may have been created with voice recognition software  Occasional wrong word or "sound a like" substitutions may have occurred due to the inherent limitations of voice recognition software  Read the chart carefully and recognize, using context, where substitutions have occurred  Disposition  Final diagnoses:   Cystitis     Time reflects when diagnosis was documented in both MDM as applicable and the Disposition within this note     Time User Action Codes Description Comment    5/13/2022  4:21 PM Anjel Amezcua Add [R10 2] Suprapubic pain     5/13/2022  4:21 PM Wilman Liebermany [R10 2] Suprapubic pain     5/13/2022  4:21 PM Anjel Amezcua Add [N30 90] Cystitis       ED Disposition     ED Disposition   Discharge    Condition   Stable    Date/Time   Fri May 13, 2022  4:21 PM    Comment   Rigoberto Curling discharge to home/self care                 Follow-up Information     Follow up With Specialties Details Why Contact Info    Luz Nuñez MD Urology Schedule an appointment as soon as possible for a visit   [address] [place] [address]  [phone]            Discharge Medication List as of 5/13/2022  4:29 PM      START taking these medications    Details   ondansetron (Zofran ODT) 4 mg disintegrating tablet Take 1 tablet (4 mg total) by mouth every 6 (six) hours as needed for nausea or vomiting, Starting Fri 5/13/2022, Normal      phenazopyridine (PYRIDIUM) 100 mg tablet Take 1 tablet (100 mg total) by mouth as needed in the morning and 1 tablet (100 mg total) as needed at noon and 1 tablet (100 mg total) as needed in the evening for bladder spasms  , Starting Fri 5/13/2022, Normal      sulfamethoxazole-trimethoprim (BACTRIM DS) 800-160 mg per tablet Take 1 tablet by mouth in the morning and 1 tablet in the evening  Do all this for 3 days  smx-tmp DS (BACTRIM) 800-160 mg tabs (1tab q12 D10)  , Starting Fri 5/13/2022, Until Mon 5/16/2022, Normal         CONTINUE these medications which have NOT CHANGED    Details   buprenorphine-naloxone (Suboxone) 8-2 mg Starting Wed 4/13/2022, Historical Med      hydrOXYzine HCL (ATARAX) 50 mg tablet Take 0 5 tablets (25 mg total) by mouth every 8 (eight) hours as needed for itching, Starting Tue 4/5/2022, Normal      naloxone (NARCAN) 4 mg/0 1 mL nasal spray INSTILL 1 SPRAY INTO EACH NOSTRIL FOR OVERDOSE as directed, Historical Med      QUEtiapine (SEROquel) 50 mg tablet Take 1 tablet (50 mg total) by mouth daily at bedtime, Starting Tue 4/5/2022, Normal      sertraline (ZOLOFT) 100 mg tablet Take 1 tablet (100 mg total) by mouth daily, Starting Tue 4/5/2022, Normal                 PDMP Review       Value Time User    PDMP Reviewed  Yes 4/5/2022  9:23 AM Daniel Rodriguez DO          ED Provider  Electronically Signed by           Timothy Cui PA-C  05/13/22 1541

## 2022-05-13 NOTE — DISCHARGE INSTRUCTIONS
The following findings require follow up:  Radiographic finding   Finding:  Diffuse bladder wall thickening consistent with sequela of chronic cystitis and/or chronic outlet obstruction  Punctate nonobstructing bilateral renal calculi   Follow up required:  PCP and Urology   Follow up should be done within 1 week(s)    Take Bactrim as directed  Use Pyridium as needed for bladder spasms  Use Zofran as needed for nausea  Follow up with Urology  Return to the ER with any significant change or worsening of your symptoms

## 2022-05-16 ENCOUNTER — HOSPITAL ENCOUNTER (EMERGENCY)
Facility: HOSPITAL | Age: 32
Discharge: HOME | End: 2022-05-16
Attending: EMERGENCY MEDICINE
Payer: COMMERCIAL

## 2022-05-16 ENCOUNTER — APPOINTMENT (EMERGENCY)
Dept: CT IMAGING | Facility: HOSPITAL | Age: 32
End: 2022-05-16
Payer: COMMERCIAL

## 2022-05-16 VITALS
RESPIRATION RATE: 17 BRPM | TEMPERATURE: 98 F | OXYGEN SATURATION: 99 % | SYSTOLIC BLOOD PRESSURE: 136 MMHG | DIASTOLIC BLOOD PRESSURE: 82 MMHG | HEART RATE: 82 BPM

## 2022-05-16 DIAGNOSIS — R10.30 LOWER ABDOMINAL PAIN: Primary | ICD-10-CM

## 2022-05-16 DIAGNOSIS — R11.2 NAUSEA AND VOMITING: ICD-10-CM

## 2022-05-16 LAB
ALBUMIN SERPL BCP-MCNC: 4.8 G/DL (ref 3.5–5)
ALP SERPL-CCNC: 69 U/L (ref 34–104)
ALT SERPL W P-5'-P-CCNC: 27 U/L (ref 7–52)
ANION GAP SERPL CALCULATED.3IONS-SCNC: 11 MMOL/L (ref 4–13)
AST SERPL W P-5'-P-CCNC: 16 U/L (ref 13–39)
BASOPHILS # BLD AUTO: 0.05 THOUSANDS/ΜL (ref 0–0.1)
BASOPHILS NFR BLD AUTO: 1 % (ref 0–1)
BILIRUB SERPL-MCNC: 0.96 MG/DL (ref 0.2–1)
BILIRUB UR QL STRIP: NEGATIVE
BUN SERPL-MCNC: 13 MG/DL (ref 5–25)
C TRACH DNA SPEC QL NAA+PROBE: NEGATIVE
CALCIUM SERPL-MCNC: 9.9 MG/DL (ref 8.4–10.2)
CHLORIDE SERPL-SCNC: 101 MMOL/L (ref 96–108)
CLARITY UR: NORMAL
CO2 SERPL-SCNC: 25 MMOL/L (ref 21–32)
COLOR UR: YELLOW
CREAT SERPL-MCNC: 1.2 MG/DL (ref 0.6–1.3)
EOSINOPHIL # BLD AUTO: 0.12 THOUSAND/ΜL (ref 0–0.61)
EOSINOPHIL NFR BLD AUTO: 2 % (ref 0–6)
ERYTHROCYTE [DISTWIDTH] IN BLOOD BY AUTOMATED COUNT: 11.9 % (ref 11.6–15.1)
GFR SERPL CREATININE-BSD FRML MDRD: 79 ML/MIN/1.73SQ M
GLUCOSE SERPL-MCNC: 107 MG/DL (ref 65–140)
GLUCOSE UR STRIP-MCNC: NEGATIVE MG/DL
HCT VFR BLD AUTO: 54.7 % (ref 36.5–49.3)
HGB BLD-MCNC: 18.6 G/DL (ref 12–17)
HGB UR QL STRIP.AUTO: NEGATIVE
IMM GRANULOCYTES # BLD AUTO: 0.02 THOUSAND/UL (ref 0–0.2)
IMM GRANULOCYTES NFR BLD AUTO: 0 % (ref 0–2)
KETONES UR STRIP-MCNC: NEGATIVE MG/DL
LEUKOCYTE ESTERASE UR QL STRIP: NEGATIVE
LIPASE SERPL-CCNC: 24 U/L (ref 11–82)
LYMPHOCYTES # BLD AUTO: 2.8 THOUSANDS/ΜL (ref 0.6–4.47)
LYMPHOCYTES NFR BLD AUTO: 36 % (ref 14–44)
MCH RBC QN AUTO: 27.7 PG (ref 26.8–34.3)
MCHC RBC AUTO-ENTMCNC: 34 G/DL (ref 31.4–37.4)
MCV RBC AUTO: 81 FL (ref 82–98)
MONOCYTES # BLD AUTO: 0.54 THOUSAND/ΜL (ref 0.17–1.22)
MONOCYTES NFR BLD AUTO: 7 % (ref 4–12)
N GONORRHOEA DNA SPEC QL NAA+PROBE: NEGATIVE
NEUTROPHILS # BLD AUTO: 4.2 THOUSANDS/ΜL (ref 1.85–7.62)
NEUTS SEG NFR BLD AUTO: 54 % (ref 43–75)
NITRITE UR QL STRIP: NEGATIVE
NRBC BLD AUTO-RTO: 0 /100 WBCS
PH UR STRIP.AUTO: 7 [PH]
PLATELET # BLD AUTO: 265 THOUSANDS/UL (ref 149–390)
PMV BLD AUTO: 9.2 FL (ref 8.9–12.7)
POTASSIUM SERPL-SCNC: 3.9 MMOL/L (ref 3.5–5.3)
PROT SERPL-MCNC: 7.3 G/DL (ref 6.4–8.4)
PROT UR STRIP-MCNC: NEGATIVE MG/DL
RBC # BLD AUTO: 6.72 MILLION/UL (ref 3.88–5.62)
SODIUM SERPL-SCNC: 137 MMOL/L (ref 135–147)
SP GR UR STRIP.AUTO: 1.02 (ref 1–1.03)
UROBILINOGEN UR QL STRIP.AUTO: 0.2 E.U./DL
WBC # BLD AUTO: 7.73 THOUSAND/UL (ref 4.31–10.16)

## 2022-05-16 PROCEDURE — 36415 COLL VENOUS BLD VENIPUNCTURE: CPT | Performed by: EMERGENCY MEDICINE

## 2022-05-16 PROCEDURE — 74176 CT ABD & PELVIS W/O CONTRAST: CPT

## 2022-05-16 PROCEDURE — 99284 EMERGENCY DEPT VISIT MOD MDM: CPT

## 2022-05-16 PROCEDURE — 83690 ASSAY OF LIPASE: CPT | Performed by: EMERGENCY MEDICINE

## 2022-05-16 PROCEDURE — 80053 COMPREHEN METABOLIC PANEL: CPT | Performed by: EMERGENCY MEDICINE

## 2022-05-16 PROCEDURE — 99284 EMERGENCY DEPT VISIT MOD MDM: CPT | Performed by: EMERGENCY MEDICINE

## 2022-05-16 PROCEDURE — 96375 TX/PRO/DX INJ NEW DRUG ADDON: CPT

## 2022-05-16 PROCEDURE — 81003 URINALYSIS AUTO W/O SCOPE: CPT | Performed by: EMERGENCY MEDICINE

## 2022-05-16 PROCEDURE — G1004 CDSM NDSC: HCPCS

## 2022-05-16 PROCEDURE — 96361 HYDRATE IV INFUSION ADD-ON: CPT

## 2022-05-16 PROCEDURE — 85025 COMPLETE CBC W/AUTO DIFF WBC: CPT | Performed by: EMERGENCY MEDICINE

## 2022-05-16 PROCEDURE — 96374 THER/PROPH/DIAG INJ IV PUSH: CPT

## 2022-05-16 RX ORDER — MAGNESIUM HYDROXIDE/ALUMINUM HYDROXICE/SIMETHICONE 120; 1200; 1200 MG/30ML; MG/30ML; MG/30ML
30 SUSPENSION ORAL ONCE
Status: COMPLETED | OUTPATIENT
Start: 2022-05-16 | End: 2022-05-16

## 2022-05-16 RX ORDER — KETOROLAC TROMETHAMINE 30 MG/ML
15 INJECTION, SOLUTION INTRAMUSCULAR; INTRAVENOUS ONCE
Status: COMPLETED | OUTPATIENT
Start: 2022-05-16 | End: 2022-05-16

## 2022-05-16 RX ORDER — ONDANSETRON 2 MG/ML
4 INJECTION INTRAMUSCULAR; INTRAVENOUS ONCE
Status: COMPLETED | OUTPATIENT
Start: 2022-05-16 | End: 2022-05-16

## 2022-05-16 RX ORDER — QUETIAPINE FUMARATE 50 MG/1
50 TABLET, FILM COATED ORAL
Qty: 30 TABLET | Refills: 0 | Status: SHIPPED | OUTPATIENT
Start: 2022-05-16 | End: 2022-05-23 | Stop reason: ALTCHOICE

## 2022-05-16 RX ORDER — DICYCLOMINE HCL 20 MG
20 TABLET ORAL ONCE
Status: COMPLETED | OUTPATIENT
Start: 2022-05-16 | End: 2022-05-16

## 2022-05-16 RX ORDER — ACETAMINOPHEN 325 MG/1
975 TABLET ORAL ONCE
Status: COMPLETED | OUTPATIENT
Start: 2022-05-16 | End: 2022-05-16

## 2022-05-16 RX ORDER — SUCRALFATE ORAL 1 G/10ML
1 SUSPENSION ORAL 4 TIMES DAILY PRN
Qty: 414 ML | Refills: 0 | Status: SHIPPED | OUTPATIENT
Start: 2022-05-16 | End: 2022-06-08

## 2022-05-16 RX ORDER — HYDROXYZINE 50 MG/1
25 TABLET, FILM COATED ORAL EVERY 8 HOURS PRN
Qty: 30 TABLET | Refills: 0 | Status: SHIPPED | OUTPATIENT
Start: 2022-05-16 | End: 2022-05-23 | Stop reason: ALTCHOICE

## 2022-05-16 RX ORDER — ONDANSETRON 4 MG/1
4 TABLET, ORALLY DISINTEGRATING ORAL EVERY 6 HOURS PRN
Qty: 20 TABLET | Refills: 0 | Status: SHIPPED | OUTPATIENT
Start: 2022-05-16 | End: 2022-05-25 | Stop reason: ALTCHOICE

## 2022-05-16 RX ORDER — LIDOCAINE HYDROCHLORIDE 20 MG/ML
15 SOLUTION OROPHARYNGEAL ONCE
Status: COMPLETED | OUTPATIENT
Start: 2022-05-16 | End: 2022-05-16

## 2022-05-16 RX ORDER — SERTRALINE HYDROCHLORIDE 100 MG/1
100 TABLET, FILM COATED ORAL DAILY
Qty: 90 TABLET | Refills: 0 | Status: SHIPPED | OUTPATIENT
Start: 2022-05-16 | End: 2022-05-23 | Stop reason: ALTCHOICE

## 2022-05-16 RX ADMIN — ONDANSETRON 4 MG: 2 INJECTION INTRAMUSCULAR; INTRAVENOUS at 07:39

## 2022-05-16 RX ADMIN — SODIUM CHLORIDE 1000 ML: 0.9 INJECTION, SOLUTION INTRAVENOUS at 07:33

## 2022-05-16 RX ADMIN — ALUMINUM HYDROXIDE, MAGNESIUM HYDROXIDE, AND SIMETHICONE 30 ML: 200; 200; 20 SUSPENSION ORAL at 09:44

## 2022-05-16 RX ADMIN — KETOROLAC TROMETHAMINE 15 MG: 30 INJECTION, SOLUTION INTRAMUSCULAR at 07:39

## 2022-05-16 RX ADMIN — ACETAMINOPHEN 975 MG: 325 TABLET ORAL at 07:39

## 2022-05-16 RX ADMIN — DICYCLOMINE HYDROCHLORIDE 20 MG: 20 TABLET ORAL at 07:39

## 2022-05-16 RX ADMIN — LIDOCAINE HYDROCHLORIDE 15 ML: 20 SOLUTION ORAL; TOPICAL at 09:44

## 2022-05-16 NOTE — Clinical Note
Janey Lizzette was seen and treated in our emergency department on 5/16/2022  Diagnosis: lower abdominal pain    Nicholas Deng  may return to work on return date  He may return on this date: 05/17/2022         If you have any questions or concerns, please don't hesitate to call        Brad Mccartney DO    ______________________________           _______________          _______________  Hospital Representative                              Date                                Time

## 2022-05-16 NOTE — ED PROVIDER NOTES
History  Chief Complaint   Patient presents with    Abdominal Pain     Patient reports he was seen here last week and treated for an infection  Patient reports he is still having abdominal pain and has been unable to eat anything for 5 days      Patient is a 21-year-old male who presents for evaluation of lower abdominal pain  Patient was seen here 3 days ago for similar symptoms  He was found to have cystitis on CT scan was treated with antibiotics  His urine was clean at that time  Patient says that his symptoms have not improved  He is having persistent lower abdominal pain  He says he has not been able with to really eat or drink anything secondary to the discomfort and nausea/vomiting  He says that he feels like he is having difficulty urinating but is able to urinate  He denies any loose stools, blood in his stools  He denies any fevers or chills  Prior to Admission Medications   Prescriptions Last Dose Informant Patient Reported? Taking? buprenorphine-naloxone (Suboxone) 8-2 mg   Yes No   naloxone (NARCAN) 4 mg/0 1 mL nasal spray   Yes No   Sig: INSTILL 1 SPRAY INTO EACH NOSTRIL FOR OVERDOSE as directed   ondansetron (Zofran ODT) 4 mg disintegrating tablet   No No   Sig: Take 1 tablet (4 mg total) by mouth every 6 (six) hours as needed for nausea or vomiting   phenazopyridine (PYRIDIUM) 100 mg tablet   No No   Sig: Take 1 tablet (100 mg total) by mouth as needed in the morning and 1 tablet (100 mg total) as needed at noon and 1 tablet (100 mg total) as needed in the evening for bladder spasms  sulfamethoxazole-trimethoprim (BACTRIM DS) 800-160 mg per tablet   No No   Sig: Take 1 tablet by mouth in the morning and 1 tablet in the evening  Do all this for 3 days  smx-tmp DS (BACTRIM) 800-160 mg tabs (1tab q12 D10)        Facility-Administered Medications: None       Past Medical History:   Diagnosis Date    Asthma     Depressed        Past Surgical History:   Procedure Laterality Date  APPENDECTOMY      DENTAL SURGERY  03/25/2022       History reviewed  No pertinent family history  I have reviewed and agree with the history as documented  E-Cigarette/Vaping    E-Cigarette Use Never User      E-Cigarette/Vaping Substances    Nicotine No     THC No     CBD No     Flavoring No     Other No     Unknown No      Social History     Tobacco Use    Smoking status: Former Smoker     Packs/day: 1 00     Years: 10 00     Pack years: 10 00     Types: Cigarettes    Smokeless tobacco: Never Used   Vaping Use    Vaping Use: Never used   Substance Use Topics    Alcohol use: Yes     Comment: monthly    Drug use: No       Review of Systems   Constitutional: Negative for chills, fever and unexpected weight change  HENT: Negative for congestion, sore throat and trouble swallowing  Eyes: Negative for pain, discharge and itching  Respiratory: Negative for cough, chest tightness, shortness of breath and wheezing  Cardiovascular: Negative for chest pain, palpitations and leg swelling  Gastrointestinal: Positive for abdominal pain (lower), nausea and vomiting  Negative for blood in stool and diarrhea  Endocrine: Negative for polyuria  Genitourinary: Positive for difficulty urinating  Negative for dysuria, frequency and hematuria  Musculoskeletal: Negative for arthralgias and back pain  Neurological: Negative for dizziness, syncope, weakness, light-headedness and headaches  Physical Exam  Physical Exam  Vitals and nursing note reviewed  Constitutional:       General: He is not in acute distress  Appearance: He is well-developed  HENT:      Head: Normocephalic and atraumatic  Right Ear: External ear normal       Left Ear: External ear normal    Eyes:      Conjunctiva/sclera: Conjunctivae normal       Pupils: Pupils are equal, round, and reactive to light  Cardiovascular:      Rate and Rhythm: Normal rate and regular rhythm  Heart sounds: Normal heart sounds  No murmur heard  No friction rub  No gallop  Pulmonary:      Effort: Pulmonary effort is normal  No respiratory distress  Breath sounds: Normal breath sounds  No wheezing or rales  Abdominal:      General: Bowel sounds are normal  There is no distension  Palpations: Abdomen is soft  Tenderness: There is abdominal tenderness in the right lower quadrant, suprapubic area and left lower quadrant  There is no guarding  Musculoskeletal:         General: No tenderness or deformity  Normal range of motion  Cervical back: Normal range of motion  Lymphadenopathy:      Cervical: No cervical adenopathy  Skin:     General: Skin is warm and dry  Neurological:      General: No focal deficit present  Mental Status: He is alert and oriented to person, place, and time  Mental status is at baseline  Cranial Nerves: No cranial nerve deficit  Sensory: No sensory deficit  Motor: No weakness or abnormal muscle tone     Psychiatric:         Behavior: Behavior normal          Vital Signs  ED Triage Vitals   Temperature Pulse Respirations Blood Pressure SpO2   05/16/22 0722 05/16/22 0718 05/16/22 0718 05/16/22 0718 05/16/22 0718   98 °F (36 7 °C) 82 17 136/82 99 %      Temp Source Heart Rate Source Patient Position - Orthostatic VS BP Location FiO2 (%)   05/16/22 0722 05/16/22 0718 05/16/22 0718 05/16/22 0718 --   Tympanic Monitor Lying Left arm       Pain Score       05/16/22 0718       9           Vitals:    05/16/22 0718   BP: 136/82   Pulse: 82   Patient Position - Orthostatic VS: Lying         Visual Acuity      ED Medications  Medications   sodium chloride 0 9 % bolus 1,000 mL (0 mL Intravenous Stopped 5/16/22 0842)   acetaminophen (TYLENOL) tablet 975 mg (975 mg Oral Given 5/16/22 0739)   ketorolac (TORADOL) injection 15 mg (15 mg Intravenous Given 5/16/22 0739)   ondansetron (ZOFRAN) injection 4 mg (4 mg Intravenous Given 5/16/22 0739)   dicyclomine (BENTYL) tablet 20 mg (20 mg Oral Given 5/16/22 0739)   Lidocaine Viscous HCl (XYLOCAINE) 2 % mucosal solution 15 mL (15 mL Swish & Swallow Given 5/16/22 0944)   aluminum-magnesium hydroxide-simethicone (MYLANTA) oral suspension 30 mL (30 mL Oral Given 5/16/22 0944)       Diagnostic Studies  Results Reviewed     Procedure Component Value Units Date/Time    Comprehensive metabolic panel [[id number]] Collected: 05/16/22 0733    Lab Status: Final result Specimen: Blood from Arm, Right Updated: 05/16/22 0755     Sodium 137 mmol/L      Potassium 3 9 mmol/L      Chloride 101 mmol/L      CO2 25 mmol/L      ANION GAP 11 mmol/L      BUN 13 mg/dL      Creatinine 1 20 mg/dL      Glucose 107 mg/dL      Calcium 9 9 mg/dL      AST 16 U/L      ALT 27 U/L      Alkaline Phosphatase 69 U/L      Total Protein 7 3 g/dL      Albumin 4 8 g/dL      Total Bilirubin 0 96 mg/dL      eGFR 79 ml/min/1 73sq m     Narrative:      [place] guidelines for Chronic Kidney Disease (CKD):     Stage 1 with normal or high GFR (GFR > 90 mL/min/1 73 square meters)    Stage 2 Mild CKD (GFR = 60-89 mL/min/1 73 square meters)    Stage 3A Moderate CKD (GFR = 45-59 mL/min/1 73 square meters)    Stage 3B Moderate CKD (GFR = 30-44 mL/min/1 73 square meters)    Stage 4 Severe CKD (GFR = 15-29 mL/min/1 73 square meters)    Stage 5 End Stage CKD (GFR <15 mL/min/1 73 square meters)  Note: GFR calculation is accurate only with a steady state creatinine    Lipase [[id number]]  (Normal) Collected: 05/16/22 0733    Lab Status: Final result Specimen: Blood from Arm, Right Updated: 05/16/22 0755     Lipase 24 u/L     CBC and differential [[id number]]  (Abnormal) Collected: 05/16/22 0733    Lab Status: Final result Specimen: Blood from Arm, Right Updated: 05/16/22 0745     WBC 7 73 Thousand/uL      RBC 6 72 Million/uL      Hemoglobin 18 6 g/dL      Hematocrit 54 7 %      MCV 81 fL      MCH 27 7 pg      MCHC 34 0 g/dL      RDW 11 9 %      MPV 9 2 fL      Platelets 669 Thousands/uL      nRBC 0 /100 WBCs      Neutrophils Relative 54 %      Immat GRANS % 0 %      Lymphocytes Relative 36 %      Monocytes Relative 7 %      Eosinophils Relative 2 %      Basophils Relative 1 %      Neutrophils Absolute 4 20 Thousands/µL      Immature Grans Absolute 0 02 Thousand/uL      Lymphocytes Absolute 2 80 Thousands/µL      Monocytes Absolute 0 54 Thousand/µL      Eosinophils Absolute 0 12 Thousand/µL      Basophils Absolute 0 05 Thousands/µL     UA (URINE) with reflex to Scope [[id number]]  (Normal) Collected: 05/16/22 0733    Lab Status: Final result Specimen: Urine, Clean Catch Updated: 05/16/22 0740     Color, UA Yellow     Clarity, UA Hazy     Specific [place], UA 1 025     pH, UA 7 0     Leukocytes, UA Negative     Nitrite, UA Negative     Protein, UA Negative mg/dl      Glucose, UA Negative mg/dl      Ketones, UA Negative mg/dl      Urobilinogen, UA 0 2 E U /dl      Bilirubin, UA Negative     Blood, UA Negative                 CT abdomen pelvis wo contrast   Final Result by Lizzie Cedeno MD (05/16 9795)      No evidence of acute abdominopelvic process  Stable punctate bilateral nonobstructing calculi  Workstation performed: [id number]                    Procedures  Procedures         ED Course  ED Course as of 05/16/22 1430   Mon May 16, 2022   1019 Patient symptoms improved with GI cocktail  Will discharge with GI referral                                             MDM  Number of Diagnoses or Management Options  Diagnosis management comments: 25-year-old male presenting for lower abdominal pain  Has been present for 3 days  Was seen here 3 days ago diagnosed with cystitis  Has been taking the antibiotic without relief  UA was clean at that time  Decreased p o  intake secondary to symptoms  Vitals within normal limits    Mild lower abdominal tenderness on exam   Will obtain belly labs, UA, CT abdomen pelvis without contrast   Will give Toradol, Tylenol IV fluids and Zofran  Disposition  Final diagnoses:   Lower abdominal pain   Nausea and vomiting     Time reflects when diagnosis was documented in both MDM as applicable and the Disposition within this note     Time User Action Codes Description Comment    5/16/2022  9:14 AM Eduardo Jara Add [R10 30] Lower abdominal pain     5/16/2022  9:14 AM Eduardo Jara Add [R11 2] Nausea and vomiting       ED Disposition     ED Disposition   Discharge    Condition   Stable    Date/Time   Mon May 16, 2022  9:14 AM    Comment   Charlene Dick discharge to home/self care  Follow-up Information     Follow up With Specialties Details Why Contact Info Additional [address] [facility], DO Family Medicine Schedule an appointment as soon as possible for a visit  For follow up of symptoms [address] [facility]  [address] [facility] [address] [phone]       [facility] Emergency Medicine Go to  If symptoms worsen [address] [place] [id number]  [facility] Emergency Department, [address] [place], [place], [address] [facility]          Discharge Medication List as of 5/16/2022 10:21 AM      START taking these medications    Details   !! ondansetron (Zofran ODT) 4 mg disintegrating tablet Take 1 tablet (4 mg total) by mouth every 6 (six) hours as needed for nausea or vomiting, Starting Mon 5/16/2022, Normal      sucralfate (CARAFATE) 1 g/10 mL suspension Take 10 mL (1 g total) by mouth as needed in the morning and 10 mL (1 g total) as needed at noon and 10 mL (1 g total) as needed in the evening and 10 mL (1 g total) as needed before bedtime (abdominal pain)  , Starting Mon 5/16/2022, Normal       !! - Potential duplicate medications found  Please discuss with provider        CONTINUE these medications which have NOT CHANGED    Details   buprenorphine-naloxone (Suboxone) 8-2 mg Starting Wed 4/13/2022, Historical Med      naloxone (NARCAN) 4 mg/0 1 mL nasal spray INSTILL 1 SPRAY INTO EACH NOSTRIL FOR OVERDOSE as directed, Historical Med      !! ondansetron (Zofran ODT) 4 mg disintegrating tablet Take 1 tablet (4 mg total) by mouth every 6 (six) hours as needed for nausea or vomiting, Starting Fri 5/13/2022, Normal      phenazopyridine (PYRIDIUM) 100 mg tablet Take 1 tablet (100 mg total) by mouth as needed in the morning and 1 tablet (100 mg total) as needed at noon and 1 tablet (100 mg total) as needed in the evening for bladder spasms  , Starting Fri 5/13/2022, Normal      sulfamethoxazole-trimethoprim (BACTRIM DS) 800-160 mg per tablet Take 1 tablet by mouth in the morning and 1 tablet in the evening  Do all this for 3 days  smx-tmp DS (BACTRIM) 800-160 mg tabs (1tab q12 D10)  , Starting Fri 5/13/2022, Until Mon 5/16/2022, Normal      hydrOXYzine HCL (ATARAX) 50 mg tablet Take 0 5 tablets (25 mg total) by mouth every 8 (eight) hours as needed for itching, Starting Tue 4/5/2022, Normal      QUEtiapine (SEROquel) 50 mg tablet Take 1 tablet (50 mg total) by mouth daily at bedtime, Starting Tue 4/5/2022, Normal      sertraline (ZOLOFT) 100 mg tablet Take 1 tablet (100 mg total) by mouth daily, Starting Tue 4/5/2022, Normal       !! - Potential duplicate medications found  Please discuss with provider                PDMP Review       Value Time User    PDMP Reviewed  Yes 4/5/2022  9:23 AM Esther Burton DO          ED Provider  Electronically Signed by           Kelsie Stark DO  05/16/22 4426

## 2022-05-17 ENCOUNTER — TELEPHONE (OUTPATIENT)
Dept: UROLOGY | Facility: AMBULATORY SURGERY CENTER | Age: 32
End: 2022-05-17

## 2022-05-17 NOTE — TELEPHONE ENCOUNTER
Returned call to patient   Patient went to pcp who sent him to the Ed  5/13/22 and again on 5/16/22  Reports nausea , vomiting and decreased appetite, delay in urinary stream  No tempeture reported  Pain is reported b/l flank pain radiating into bladder  Ed prescribed Zofran, Carafate which is not helping with nausea  Patient is scheduled with Gastroenterology tomorrow      IMPRESSION CT :      No evidence of acute abdominopelvic process      Stable punctate bilateral nonobstructing calculi

## 2022-05-17 NOTE — TELEPHONE ENCOUNTER
Called Karoline Haley back and scheduled him for follow up in [facility] ([facility]) as that office is closer to his home

## 2022-05-17 NOTE — TELEPHONE ENCOUNTER
Please Triage  New Patient    What is the reason for the patients appointment? Lower abdominal pain vomiting soaking in sweat and not eating in 5 days     What office location does the patient prefer? 1st available-[place] or [place] or [place]    Imaging/Lab Results:    Do we accept the patient's insurance or is the patient Self-Pay? [facility]    Insurance Provider:  Plan Type/Number:  Member ID#: Has the patient had any previous Urologist(s)? no    Have patient records been requested? If not are records showing in Epic: no records in EPIC    Has the patient had any outside testing done? no    Does the patient have a personal history of cancer?  No    Pt call [phone]

## 2022-05-17 NOTE — TELEPHONE ENCOUNTER
Recent CT scan from 05/16/2022 showed no evidence of obstructing stone  Stable, punctate, bilateral nonobstructing calculi seen without any hydronephrosis  Bladder unremarkable  Urine testing appear negative for infection or blood  Would encourage patient to increase water intake and avoid bladder irritants and constipation  He may be scheduled for next available office visit  Okay for patient to follow-up with Gastroenterology first for symptoms of nausea, vomiting, and decreased appetite

## 2022-05-18 ENCOUNTER — OFFICE VISIT (OUTPATIENT)
Dept: GASTROENTEROLOGY | Facility: CLINIC | Age: 32
End: 2022-05-18
Payer: COMMERCIAL

## 2022-05-18 VITALS
HEART RATE: 102 BPM | OXYGEN SATURATION: 99 % | DIASTOLIC BLOOD PRESSURE: 78 MMHG | HEIGHT: 68 IN | BODY MASS INDEX: 30.92 KG/M2 | SYSTOLIC BLOOD PRESSURE: 128 MMHG | WEIGHT: 204 LBS

## 2022-05-18 DIAGNOSIS — R63.8 INADEQUATE ORAL INTAKE: ICD-10-CM

## 2022-05-18 DIAGNOSIS — R11.2 NAUSEA AND VOMITING: ICD-10-CM

## 2022-05-18 DIAGNOSIS — R63.4 UNINTENTIONAL WEIGHT LOSS: ICD-10-CM

## 2022-05-18 DIAGNOSIS — R10.9 ABDOMINAL PAIN, UNSPECIFIED ABDOMINAL LOCATION: Primary | ICD-10-CM

## 2022-05-18 PROCEDURE — 99204 OFFICE O/P NEW MOD 45 MIN: CPT | Performed by: FAMILY MEDICINE

## 2022-05-18 RX ORDER — PANTOPRAZOLE SODIUM 20 MG/1
20 TABLET, DELAYED RELEASE ORAL DAILY
Qty: 30 TABLET | Refills: 1 | Status: SHIPPED | OUTPATIENT
Start: 2022-05-18 | End: 2022-05-25 | Stop reason: ALTCHOICE

## 2022-05-18 NOTE — PROGRESS NOTES
[facility] - Outpatient Consultation  Mirella Lassiter 27 y o  male MRN: [id number]  Encounter: [id number]          ASSESSMENT AND PLAN:      1  Abdominal pain, unspecified abdominal location  2  Nausea and vomiting  Patient with mid abdominal/periumbilical pain which radiates to bilateral lower abdomen, more so right-sided now with nausea vomiting with all p o  intake  Patient has been evaluated in the ED twice within the last week - Serologic workup entirely unremarkable and CTA/P x2 without acute abdomen or pelvic findings, although notable for diffuse bladder wall thickening consistent with ?chronic cystitis and stable punctate nonobstructive bilateral renal calculi  Patient treated with Pyridium and Bactrim for chronic cystitis (UA unremarkable) and Zofran and Carafate for possible reflux, all without relief  Discussed with patient that he appears generally unwell, if he had not been evaluated in the ED I would recommend he proceed there today  Will order a complete abdominal US with Doppler today, with specific attention to the right side of the abdomen  Also ordered a C-reactive protein and fecal calprotectin to rule out an atypical presentation of autoimmune colitis  Also ordered H pylori stool antigen given nausea/vomiting  Prescribed pantoprazole 20 mg once daily to ensure GERD is not contributing - May continue to take Carafate and Zofran if helpful  Offer prescription for Bentyl, patient declined  May also consider possible SE from Suboxone, given sxs started 2 weeks after beginning med  Lastly, discussed strict ED/return precautions, especially if persistent inability to tolerate p o  concerning for dehydration   - Ambulatory Referral to Gastroenterology  - C-reactive protein; Future  - Calprotectin,Fecal; Future  - H  pylori antigen, stool; Future  - pantoprazole (PROTONIX) 20 mg tablet; Take 1 tablet (20 mg total) by mouth in the morning  Dispense: 30 tablet;  Refill: 1  - US abdomen complete with doppler; Future    3  Inadequate oral intake  4  Unintentional weight loss  Patient with poor p o  intake x4-5 days  Most recently evaluated in the ED on 05/16 with grossly unremarkable labs and imaging, although did show likely hemoconcentration  Patient did receive IV fluids at that time  Discussed the importance of adequate hydration by whatever means possible (water, electrolyte beverages, soups, ice pops, Jell-O)  Discussed strict ED/return precautions if unable to do so  Believe patient should have EGD/colon for complete evaluation, but entirely unable to tolerate bowel prep at this time  Will again discuss scopes at his follow-up appointment  Follow-up in 2 weeks  ______________________________________________________________________    HPI: Patient is a 27 y o  male with PMH significant for anxiety and history of substance abuse recently started Suboxone who presents today for a consultation regarding abdominal pain and nausea/vomiting  Patient was referred after discharge from the ED  Patient reports gradual onset of abdominal pain starting 1-2 weeks prior, and was in normal state of health proceeding  Describes the pain as mid abdominal/periumbilical, which radiates to both right and left lower abdomen, more-so right sided  States that Ebbs and flows, but never fully resolved  Describes it as sharp with occasional ache  States he has been evaluated in the ED twice this week for his symptoms  Initially, his symptoms were thought to be due to chronic cystitis although his UA was unremarkable  He was treated with pyridium and Bactrim advised follow-up with urology  He was re-evaluated on 05/16 for persistent abdominal pain with new onset nausea/vomiting  Symptoms were then thought to be more GI related patient was discharged with a prescription of Carafate and Zofran  Patient serologic workup including CBC, CMP, and lipase were unremarkable   His UA and chlamydia/gonorrhea are also negative  CTA/P x2 without acute abdomen or pelvic findings - Although without contrast      Today, patient states his pain is as good as it gets  Admits to extremely poor p o  intake within the last 4-5 days  Also denies having had a BM within the last week, but attributes this to not eating/drinking - Continues to pass flatus  He also notes having a sensation to urinate, but cannot and strains to void a small amount  Has also lost approx  7 lbs in one month  Denies fever/chills, heartburn, dysphagia, early satiety, abdominal distention, diarrhea, noticing blood in his stools/black tarry stools  Denies eating undercooked or poorly prepared foods, recent travel outside the country, or known sick contacts  States pain is unrelated to eating or bowel movements  Denies drug use  REVIEW OF SYSTEMS:    CONSTITUTIONAL: Denies any fever, chills, rigors, and weight loss  HEENT: No earache or tinnitus  Denies hearing loss or visual disturbances  CARDIOVASCULAR: No chest pain or palpitations  RESPIRATORY: Denies any cough, hemoptysis, shortness of breath or dyspnea on exertion  GASTROINTESTINAL: As noted in the History of Present Illness  GENITOURINARY: No problems with urination  Denies any hematuria or dysuria  NEUROLOGIC: No dizziness or vertigo, denies headaches  MUSCULOSKELETAL: Denies any muscle or joint pain  SKIN: Denies skin rashes or itching  ENDOCRINE: Denies excessive thirst  Denies intolerance to heat or cold  PSYCHOSOCIAL: Denies depression or anxiety  Denies any recent memory loss         Historical Information   Past Medical History:   Diagnosis Date    Asthma     Depressed      Past Surgical History:   Procedure Laterality Date    APPENDECTOMY      DENTAL SURGERY  03/25/2022     Social History   Social History     Substance and Sexual Activity   Alcohol Use Yes    Comment: monthly     Social History     Substance and Sexual Activity   Drug Use No Social History     Tobacco Use   Smoking Status Former Smoker    Packs/day: 1 00    Years: 10 00    Pack years: 10 00    Types: Cigarettes   Smokeless Tobacco Never Used     No family history on file  Meds/Allergies       Current Outpatient Medications:     buprenorphine-naloxone (Suboxone) 8-2 mg    naloxone (NARCAN) 4 mg/0 1 mL nasal spray    ondansetron (Zofran ODT) 4 mg disintegrating tablet    ondansetron (Zofran ODT) 4 mg disintegrating tablet    pantoprazole (PROTONIX) 20 mg tablet    phenazopyridine (PYRIDIUM) 100 mg tablet    sucralfate (CARAFATE) 1 g/10 mL suspension    hydrOXYzine HCL (ATARAX) 50 mg tablet    QUEtiapine (SEROquel) 50 mg tablet    sertraline (ZOLOFT) 100 mg tablet    No Known Allergies        Objective     Blood pressure 128/78, pulse 102, height 5' 8" (1 727 m), weight 92 5 kg (204 lb), SpO2 99 %  Body mass index is 31 02 kg/m²  PHYSICAL EXAM:      General Appearance:   +Appears to be in pain, holding right side of abdomen and diaphoretic; Alert, cooperative   HEENT:   Normocephalic, atraumatic, anicteric  Neck:  Supple, symmetrical, trachea midline   Lungs:   Clear to auscultation bilaterally; no rales, rhonchi or wheezing; respirations unlabored    Heart[de-identified]   Regular rate and rhythm; no murmur, rub, or gallop  Abdomen:   +Moderate mid and b/l lower abd TTP more-so right sided without guarding rebound or rigidity; Soft; hypoactive bowel sounds; no masses, no organomegaly    Genitalia:   Deferred    Rectal:   Deferred    Extremities:  No cyanosis, clubbing or edema    Pulses:  2+ and symmetric    Skin:  No jaundice, rashes, or lesions    Lymph nodes:  No palpable cervical lymphadenopathy        Lab Results:   No visits with results within 1 Day(s) from this visit     Latest known visit with results is:   Admission on 05/16/2022, Discharged on 05/16/2022   Component Date Value    WBC 05/16/2022 7 73     RBC 05/16/2022 6 72 (A)    Hemoglobin 05/16/2022 18 6 (A)    Hematocrit 05/16/2022 54 7 (A)    MCV 05/16/2022 81 (A)    MCH 05/16/2022 27 7     MCHC 05/16/2022 34 0     RDW 05/16/2022 11 9     MPV 05/16/2022 9 2     Platelets 83/83/1293 265     nRBC 05/16/2022 0     Neutrophils Relative 05/16/2022 54     Immat GRANS % 05/16/2022 0     Lymphocytes Relative 05/16/2022 36     Monocytes Relative 05/16/2022 7     Eosinophils Relative 05/16/2022 2     Basophils Relative 05/16/2022 1     Neutrophils Absolute 05/16/2022 4 20     Immature Grans Absolute 05/16/2022 0 02     Lymphocytes Absolute 05/16/2022 2 80     Monocytes Absolute 05/16/2022 0 54     Eosinophils Absolute 05/16/2022 0 12     Basophils Absolute 05/16/2022 0 05     Sodium 05/16/2022 137     Potassium 05/16/2022 3 9     Chloride 05/16/2022 101     CO2 05/16/2022 25     ANION GAP 05/16/2022 11     BUN 05/16/2022 13     Creatinine 05/16/2022 1 20     Glucose 05/16/2022 107     Calcium 05/16/2022 9 9     AST 05/16/2022 16     ALT 05/16/2022 27     Alkaline Phosphatase 05/16/2022 69     Total Protein 05/16/2022 7 3     Albumin 05/16/2022 4 8     Total Bilirubin 05/16/2022 0 96     eGFR 05/16/2022 79     Lipase 05/16/2022 24     Color, UA 05/16/2022 Yellow     Clarity, UA 05/16/2022 Hazy     Specific [place], UA 05/16/2022 1 025     pH, UA 05/16/2022 7 0     Leukocytes, UA 05/16/2022 Negative     Nitrite, UA 05/16/2022 Negative     Protein, UA 05/16/2022 Negative     Glucose, UA 05/16/2022 Negative     Ketones, UA 05/16/2022 Negative     Urobilinogen, UA 05/16/2022 0 2     Bilirubin, UA 05/16/2022 Negative     Blood, UA 05/16/2022 Negative          Radiology Results:   CT abdomen pelvis wo contrast    Result Date: 5/16/2022  Narrative: CT ABDOMEN AND PELVIS WITHOUT IV CONTRAST INDICATION:   lower abdominal pain   COMPARISON:  CT abdomen and pelvis 5/13/2022 TECHNIQUE:  CT examination of the abdomen and pelvis was performed without intravenous contrast  This examination was performed without intravenous contrast in the context of the critical nationwide Omnipaque shortage  Axial, sagittal, and coronal 2D reformatted images were created from the source data and submitted for interpretation  Radiation dose length product (DLP) for this visit:  643 37 mGy-cm   This examination, like all CT scans performed in the [facility], was performed utilizing techniques to minimize radiation dose exposure, including the use of iterative  reconstruction and automated exposure control  Enteric contrast was not administered  FINDINGS: ABDOMEN LOWER CHEST:  No clinically significant abnormality identified in the visualized lower chest  LIVER/BILIARY TREE:  Unremarkable  GALLBLADDER:  No calcified gallstones  Subtle fluid fluid level suggestive of gallbladder sludge  No pericholecystic inflammatory change  SPLEEN:  Unremarkable  PANCREAS:  Unremarkable  ADRENAL GLANDS:  Unremarkable  KIDNEYS/URETERS:  Stable punctate bilateral renal lower pole calculi, the larger of which measures 2 mm on the left  Right renal simple cysts, the largest of which measures 1 5 cm  Bilateral renal subcentimeter hypodensities too small to characterize statistically simple cyst  No perinephric collection  STOMACH AND BOWEL:  Unremarkable  APPENDIX:  Post appendectomy  ABDOMINOPELVIC CAVITY:  No ascites  No pneumoperitoneum  No lymphadenopathy  VESSELS:  Unremarkable for patient's age  PELVIS REPRODUCTIVE ORGANS:  Unremarkable for patient's age  URINARY BLADDER:  Unremarkable for degree of distention  ABDOMINAL WALL/INGUINAL REGIONS:  Tiny fat-containing umbilical hernia  OSSEOUS STRUCTURES:  No acute fracture or osseous destructive lesion identified  Mild degenerative changes of the spine  Stable mild chronic compression deformity of T11 and T12  Stable old ununited fracture peripheral right posterior 12th rib image 106 series 601 and left T12 superior articular process image 95 series 602  Impression: No evidence of acute abdominopelvic process  Stable punctate bilateral nonobstructing calculi  Workstation performed: [id number]     CT abdomen pelvis wo contrast    Result Date: 5/13/2022  Narrative: CT ABDOMEN AND PELVIS WITHOUT IV CONTRAST INDICATION:   Suprapubic pain with rebound tenderness  COMPARISON:  None  TECHNIQUE:  CT examination of the abdomen and pelvis was performed without administration of intravenous contrast, limiting evaluation for certain processes  Axial, sagittal, and coronal 2D reformatted images were created from the source data and submitted for interpretation  Radiation dose length product (DLP) for this visit:  694 14 mGy-cm   This examination, like all CT scans performed in the [facility], was performed utilizing techniques to minimize radiation dose exposure, including the use of iterative  reconstruction and automated exposure control  IV Contrast: Because of a critical nationwide shortage, this study was performed without intravenous contrast  Enteric Contrast:  Enteric contrast was not administered  FINDINGS: ABDOMEN LOWER CHEST: No clinically significant abnormality is identified in the visualized lower chest  No consolidation or effusion  LIVER: Normal size and morphology  No gross abnormality on noncontrast study  BILIARY: No intrahepatic biliary ductal dilatation  Normal caliber common bile duct  GALLBLADDER: No calcified gallstones  Normal wall thickness  No pericholecystic inflammatory changes  SPLEEN: Within normal limits  No gross mass on noncontrast study  Normal spleen size  PANCREAS: Pancreatic parenchyma is within normal limits  No main pancreatic ductal dilatation  No peripancreatic inflammation  ADRENAL GLANDS: Within normal limits  KIDNEYS/URETERS: Normal size and position   Multiple bilateral simple cysts as well as multiple sharply circumscribed renal hypodensities, too small to accurately characterize, which are statistically benign findings requiring no further workup or follow-up (Reference: Radiology 2019 292:2, [id number])  No suspicious solid mass on noncontrast exam  No hydronephrosis  There is a single punctate calculus at the inferior pole of each kidney  Ureters within normal limits  STOMACH AND BOWEL: The stomach is distended with fluid and air but otherwise within normal limits  Normal caliber small bowel  Normal caliber large bowel  No evidence of active small or large bowel inflammatory process  APPENDIX: Appendectomy  ABDOMINOPELVIC CAVITY: No ascites  No intraperitoneal free air  No lymphadenopathy  No retroperitoneal hematoma  VESSELS: Normal caliber abdominal aorta with no detectable atherosclerotic plaque  PELVIS REPRODUCTIVE ORGANS:  Normal prostate  Symmetric seminal vesicles  URINARY BLADDER:  Diffuse bladder wall thickening consistent with sequela of chronic cystitis and/or chronic outlet obstruction  No calculi  ABDOMINAL WALL/INGUINAL REGIONS:  There is a small fat-containing umbilical hernia  BONES:  Chronic mild anterior wedging of T11-L1  Impression: 1  No acute findings in the abdomen or pelvis  2   Diffuse bladder wall thickening consistent with sequela of chronic cystitis and/or chronic outlet obstruction  3   Punctate nonobstructive bilateral renal calculi  The study was marked in EPIC for significant notification    Workstation performed: [id number]

## 2022-05-19 ENCOUNTER — NURSE TRIAGE (OUTPATIENT)
Dept: OTHER | Facility: OTHER | Age: 32
End: 2022-05-19

## 2022-05-19 NOTE — TELEPHONE ENCOUNTER
Regarding: headache   ----- Message from Laine Galdamez RN sent at 5/19/2022 11:21 AM EDT -----  "I was seen in the office yesterday  I am having new symptoms this morning  My head started hurting really bad  I am getting this weird sound in my head  It's like a frequency sound and only lasts for a minute and then goes away   I am getting dizzy and weak "

## 2022-05-19 NOTE — TELEPHONE ENCOUNTER
Patient called in today to report a severe headache with sensation/sound between his ears causing dizziness  He has not taken anything for this headache  PCP office please follow up with patient  Patient seen by Gastroenterology on 5/18/22 and started on Protonix; patient took medication yesterday and reports no improvement  Gastro, please follow up with patient  Reason for Disposition   Unexplained headache that is present > 24 hours    Answer Assessment - Initial Assessment Questions  1  LOCATION: "Where does it hurt?"       Center forehead and lower back of head pain    2  ONSET: "When did the headache start?" (Minutes, hours or days)       5/19/22    3  PATTERN: "Does the pain come and go, or has it been constant since it started?"      Comes and goes    4  SEVERITY: "How bad is the pain?" and "What does it keep you from doing?"  (e g , Scale 1-10; mild, moderate, or severe)    - MILD (1-3): doesn't interfere with normal activities     - MODERATE (4-7): interferes with normal activities or awakens from sleep     - SEVERE (8-10): excruciating pain, unable to do any normal activities         Severe when it occurs    5  RECURRENT SYMPTOM: "Have you ever had headaches before?" If Yes, ask: "When was the last time?" and "What happened that time?"       Denies    6  CAUSE: "What do you think is causing the headache?"      Unknown    7  MIGRAINE: "Have you been diagnosed with migraine headaches?" If Yes, ask: "Is this headache similar?"       Not a migraine; totally different    8  HEAD INJURY: "Has there been any recent injury to the head?"       Denies    9   OTHER SYMPTOMS: "Do you have any other symptoms?" (fever, stiff neck, eye pain, sore throat, cold symptoms)      Feeling of frequency between the ears; hurts the hearing, and dizziness; reports abdominal pain continues with new prescription started 5/18/22    Protocols used: HEADACHE-ADULT-OH

## 2022-05-19 NOTE — TELEPHONE ENCOUNTER
Advised pt on use of tylneol for headache and if worsening or unrelieved, pt should go to ED  I also stated id defer further recommendations to fam med  I mentioned a common SE of pantoprazole is headache but as pt started it yesterday and it Is a low dose, cant be certain it is related  I advised pt to monitor   Also advised on use of mylanta to help with GERD symptoms

## 2022-05-20 ENCOUNTER — APPOINTMENT (OUTPATIENT)
Dept: LAB | Facility: CLINIC | Age: 32
End: 2022-05-20
Payer: COMMERCIAL

## 2022-05-20 DIAGNOSIS — R10.9 ABDOMINAL PAIN, UNSPECIFIED ABDOMINAL LOCATION: ICD-10-CM

## 2022-05-20 LAB — CRP SERPL QL: <3 MG/L

## 2022-05-20 PROCEDURE — 83993 ASSAY FOR CALPROTECTIN FECAL: CPT

## 2022-05-20 PROCEDURE — 87338 HPYLORI STOOL AG IA: CPT

## 2022-05-20 PROCEDURE — 86140 C-REACTIVE PROTEIN: CPT

## 2022-05-20 PROCEDURE — 36415 COLL VENOUS BLD VENIPUNCTURE: CPT

## 2022-05-22 LAB — H PYLORI AG STL QL IA: NEGATIVE

## 2022-05-24 LAB — CALPROTECTIN STL-MCNT: 26 UG/G (ref 0–120)

## 2022-05-25 ENCOUNTER — OFFICE VISIT (OUTPATIENT)
Dept: FAMILY MEDICINE CLINIC | Facility: CLINIC | Age: 32
End: 2022-05-25
Payer: COMMERCIAL

## 2022-05-25 ENCOUNTER — HOSPITAL ENCOUNTER (OUTPATIENT)
Dept: ULTRASOUND IMAGING | Facility: HOSPITAL | Age: 32
Discharge: HOME | End: 2022-05-25
Payer: COMMERCIAL

## 2022-05-25 VITALS
BODY MASS INDEX: 31.22 KG/M2 | WEIGHT: 206 LBS | TEMPERATURE: 98.1 F | OXYGEN SATURATION: 98 % | SYSTOLIC BLOOD PRESSURE: 108 MMHG | HEART RATE: 95 BPM | HEIGHT: 68 IN | DIASTOLIC BLOOD PRESSURE: 84 MMHG

## 2022-05-25 DIAGNOSIS — K29.70 GASTRITIS WITHOUT BLEEDING, UNSPECIFIED CHRONICITY, UNSPECIFIED GASTRITIS TYPE: ICD-10-CM

## 2022-05-25 DIAGNOSIS — E66.09 CLASS 1 OBESITY DUE TO EXCESS CALORIES WITHOUT SERIOUS COMORBIDITY WITH BODY MASS INDEX (BMI) OF 30.0 TO 30.9 IN ADULT: ICD-10-CM

## 2022-05-25 DIAGNOSIS — R10.84 GENERALIZED ABDOMINAL PAIN: Primary | ICD-10-CM

## 2022-05-25 DIAGNOSIS — N30.20 CHRONIC CYSTITIS: ICD-10-CM

## 2022-05-25 DIAGNOSIS — R10.9 ABDOMINAL PAIN, UNSPECIFIED ABDOMINAL LOCATION: ICD-10-CM

## 2022-05-25 PROCEDURE — 99214 OFFICE O/P EST MOD 30 MIN: CPT | Performed by: FAMILY MEDICINE

## 2022-05-25 PROCEDURE — 76700 US EXAM ABDOM COMPLETE: CPT

## 2022-05-25 PROCEDURE — 3725F SCREEN DEPRESSION PERFORMED: CPT | Performed by: FAMILY MEDICINE

## 2022-05-25 RX ORDER — PANTOPRAZOLE SODIUM 40 MG/1
40 TABLET, DELAYED RELEASE ORAL
Qty: 30 TABLET | Refills: 5 | Status: SHIPPED | OUTPATIENT
Start: 2022-05-25

## 2022-05-25 NOTE — PROGRESS NOTES
Assessment/Plan:    No problem-specific Assessment & Plan notes found for this encounter  Diagnoses and all orders for this visit:    Generalized abdominal pain  Comments: Following with GI, U/S today, take protonix daily for now, will need EGD/Colonosocpy  Orders:  -     pantoprazole (PROTONIX) 40 mg tablet; Take 1 tablet (40 mg total) by mouth daily before breakfast    Chronic cystitis  Comments: Will see Urology tomorrow    Gastritis without bleeding, unspecified chronicity, unspecified gastritis type  Comments:  Protonix daily increased to 40mg daily  Orders:  -     pantoprazole (PROTONIX) 40 mg tablet; Take 1 tablet (40 mg total) by mouth daily before breakfast    Class 1 obesity due to excess calories without serious comorbidity with body mass index (BMI) of 30 0 to 30 9 in adult          PHQ-2/9 Depression Screening    Little interest or pleasure in doing things: 0 - not at all  Feeling down, depressed, or hopeless: 0 - not at all  PHQ-2 Score: 0  PHQ-2 Interpretation: Negative depression screen            Subjective:      Patient ID: John Vences is a 27 y o  male  Patient presents for RTW note after absence from 5/15 -5/23, he asked to extend to 5/26 with return on 5/30  due to abdominal pain, nausea, vomiting  He was seen in the ER on 5/13 and 5/16  He was noted to have punctate stones without obstruction, GC/Chlamydia negative, CT abdomen x 2 essentially normal  Labs normal with the exception of an elevated H/H  He saw GI 5/18 and has a pending appt with Urology for chronic cystitis/diffuse bladder wall thickening (this was mentioned on the first CT only)  ER visits and GI evaluation reviewed  He will have the ultrasound today and see Urology tomorrow  He continues with significant abdominal pain every am and pm  Pain is below umbilicus extending laterally R>L  He will see GI in follow up 6/8    He does have more of an appetite but notes certain foods make his stomach feel worse, spicy foods, tomatoes  The following portions of the patient's history were reviewed and updated as appropriate: allergies, current medications, past family history, past medical history, past social history, past surgical history and problem list     Review of Systems   Constitutional: Positive for appetite change  Negative for chills, fatigue and fever  HENT: Negative  Respiratory: Negative for cough, chest tightness and shortness of breath  Cardiovascular: Negative for chest pain, palpitations and leg swelling  Gastrointestinal: Positive for abdominal pain and nausea  Negative for blood in stool, constipation, diarrhea and vomiting  Genitourinary: Positive for urgency  Negative for difficulty urinating, dysuria, frequency and hematuria  Neurological: Negative for dizziness, syncope and light-headedness  Objective:    /84   Pulse 95   Temp 98 1 °F (36 7 °C)   Ht 5' 8" (1 727 m)   Wt 93 4 kg (206 lb)   SpO2 98%   BMI 31 32 kg/m²      Physical Exam  Constitutional:       General: He is not in acute distress  Appearance: Normal appearance  He is not ill-appearing or toxic-appearing  Comments: Appears mildly uncomfortable   HENT:      Head: Normocephalic and atraumatic  Cardiovascular:      Rate and Rhythm: Normal rate and regular rhythm  Pulses: Normal pulses  Heart sounds: Normal heart sounds  No murmur heard  Pulmonary:      Effort: Pulmonary effort is normal  No respiratory distress  Breath sounds: Normal breath sounds  No wheezing  Abdominal:      General: Abdomen is flat  There is no distension  Palpations: Abdomen is soft  Tenderness: There is abdominal tenderness  There is no right CVA tenderness, left CVA tenderness, guarding or rebound  Comments: Diffusely TTP, worse LLQ, suprapubic, RLQ and epigastrium    Musculoskeletal:      Cervical back: Neck supple  Lymphadenopathy:      Cervical: No cervical adenopathy     Skin:     General: Skin is warm and dry  Neurological:      Mental Status: He is alert  Psychiatric:         Mood and Affect: Mood normal          Behavior: Behavior normal          Thought Content:  Thought content normal          Judgment: Judgment normal

## 2022-05-25 NOTE — LETTER
May 25, 2022     Patient: Tarik Galvan  YOB: 1990  Date of Visit: 5/25/2022      To Whom it May Concern:    Tarik Galvan is under my professional care  Staton Ledger was seen in my office on 5/25/2022  Brionna Griffith may return to work on 5/30/2022  If you have any questions or concerns, please don't hesitate to call           Sincerely,          Davida Felix DO        CC: No Recipients

## 2022-05-26 ENCOUNTER — OFFICE VISIT (OUTPATIENT)
Dept: UROLOGY | Facility: CLINIC | Age: 32
End: 2022-05-26
Payer: COMMERCIAL

## 2022-05-26 VITALS
HEIGHT: 68 IN | DIASTOLIC BLOOD PRESSURE: 80 MMHG | WEIGHT: 208 LBS | SYSTOLIC BLOOD PRESSURE: 124 MMHG | BODY MASS INDEX: 31.52 KG/M2

## 2022-05-26 DIAGNOSIS — R11.0 NAUSEA: Primary | ICD-10-CM

## 2022-05-26 DIAGNOSIS — G25.79: ICD-10-CM

## 2022-05-26 DIAGNOSIS — N20.0 NEPHROLITHIASIS: ICD-10-CM

## 2022-05-26 DIAGNOSIS — R79.9 ABNORMAL BLOOD FINDINGS: ICD-10-CM

## 2022-05-26 DIAGNOSIS — N50.0 BILATERAL TESTICULAR ATROPHY: ICD-10-CM

## 2022-05-26 DIAGNOSIS — Q53.23 BILATERAL HIGH SCROTAL TESTICLES: ICD-10-CM

## 2022-05-26 PROBLEM — N32.89 BLADDER WALL THICKENING: Status: ACTIVE | Noted: 2022-05-26

## 2022-05-26 PROCEDURE — 1036F TOBACCO NON-USER: CPT | Performed by: NURSE PRACTITIONER

## 2022-05-26 PROCEDURE — 3008F BODY MASS INDEX DOCD: CPT | Performed by: NURSE PRACTITIONER

## 2022-05-26 PROCEDURE — 99204 OFFICE O/P NEW MOD 45 MIN: CPT | Performed by: NURSE PRACTITIONER

## 2022-05-26 NOTE — ASSESSMENT & PLAN NOTE
· Already following with GI  · Recently taking both duloxetine and sertraline abrupt discontinuation in early May  · Check serotonin level  · Continue follow-up with PCP and GI

## 2022-05-26 NOTE — PROGRESS NOTES
Assessment and plan:     Nausea  · Already following with GI  · Recently taking both duloxetine and sertraline abrupt discontinuation in early May  · Check serotonin level  · Continue follow-up with PCP and GI    Abnormal blood findings  · Elevated hemoglobin and hematocrit, elevated RBC  · Referral to Hematology placed    Bilateral high scrotal testicles  · Occurred after taking testosterone patches and injections when he was working out at the gym  · Check testosterone level    Nephrolithiasis  · Bilateral nonobstructing kidney stones measuring 3 millimeters  · Reviewed AUA dietary recommendations and hydration goals  · Follow-up 1 year with imaging prior    Bladder wall thickening  · Diffuse thickening  · Urine microscopic unremarkable  · Consider cystoscopy if symptoms fail to improve      Follow-up pending results of testing    Lance Kocher, CRNP    History of Present Illness     Alyssa Loomis is a 27 y o  new patient who presents for nephrolithiasis  He has CT scan completed on 05/13/2022 and 05/16/2022 for suprapubic pain  Both exams revealed stable punctate bilateral nonobstructing renal calculi  His initial exam also revealed diffuse bladder wall thickening consistent with sequela of chronic cystitis and or chronic outlet obstruction  His urine testing was unremarkable  GC chlamydia were normal   His creatinine was stable 1 20  H&H 18 6 and 54 7 with MCV of 81 RBCs 6 72    Reports having an "upset stomach" since May 10th and went to the hospital x 2 since then  He reports nausea and had weight loss of 26 lbs per his report  He was started on carafate and has no improvement  He also started on zofran and pyridium  He reports lower abdominal discomfort  He feels his discomfort improves with burping and taking mylanta  He saw GI on 5/18/2022 and is undergoing testing  He states he was on both duloxetine and sertraline at the same time x 4 months, these were stopped abruptly in early May   He now only takes suboxone  He reports drinking a gallon of water a day  His urine however looks very dark yellow in color  He denies soda intake  He reports he was having some difficulty urinating but this has improved  Denies recurrent UTI  Denies gross hematuria  Denies STI hx  Over a year ago he did testosterone shots when he was working out at [employer]  He reports that his testicles have since shrunk up  Laboratory     Lab Results   Component Value Date    BUN 13 05/16/2022    CREATININE 1 20 05/16/2022       No components found for: GFR    Lab Results   Component Value Date    CALCIUM 9 9 05/16/2022    K 3 9 05/16/2022    CO2 25 05/16/2022     05/16/2022       Lab Results   Component Value Date    WBC 7 73 05/16/2022    HGB 18 6 (H) 05/16/2022    HCT 54 7 (H) 05/16/2022    MCV 81 (L) 05/16/2022     05/16/2022       No results found for: PSA    No results found for this or any previous visit (from the past 1 hour(s))  @RESULT(URINEMICROSCOPIC)@    @RESULT(URINECULTURE)@    Radiology     CT ABDOMEN AND PELVIS WITHOUT IV CONTRAST 05/16/2022     INDICATION:   lower abdominal pain      COMPARISON:  CT abdomen and pelvis 5/13/2022     TECHNIQUE:  CT examination of the abdomen and pelvis was performed without intravenous contrast  This examination was performed without intravenous contrast in the context of the critical nationwide Omnipaque shortage  Axial, sagittal, and coronal 2D   reformatted images were created from the source data and submitted for interpretation       Radiation dose length product (DLP) for this visit:  643 37 mGy-cm     This examination, like all CT scans performed in the [facility], was performed utilizing techniques to minimize radiation dose exposure, including the use of iterative   reconstruction and automated exposure control       Enteric contrast was not administered       FINDINGS:     ABDOMEN     LOWER CHEST:  No clinically significant abnormality identified in the visualized lower chest      LIVER/BILIARY TREE:  Unremarkable      GALLBLADDER:  No calcified gallstones  Subtle fluid fluid level suggestive of gallbladder sludge  No pericholecystic inflammatory change      SPLEEN:  Unremarkable      PANCREAS:  Unremarkable      ADRENAL GLANDS:  Unremarkable      KIDNEYS/URETERS:  Stable punctate bilateral renal lower pole calculi, the larger of which measures 2 mm on the left  Right renal simple cysts, the largest of which measures 1 5 cm  Bilateral renal subcentimeter hypodensities too small to characterize   statistically simple cyst  No perinephric collection      STOMACH AND BOWEL:  Unremarkable      APPENDIX:  Post appendectomy      ABDOMINOPELVIC CAVITY:  No ascites  No pneumoperitoneum  No lymphadenopathy      VESSELS:  Unremarkable for patient's age      PELVIS     REPRODUCTIVE ORGANS:  Unremarkable for patient's age      URINARY BLADDER:  Unremarkable for degree of distention      ABDOMINAL WALL/INGUINAL REGIONS:  Tiny fat-containing umbilical hernia      OSSEOUS STRUCTURES:  No acute fracture or osseous destructive lesion identified  Mild degenerative changes of the spine  Stable mild chronic compression deformity of T11 and T12  Stable old ununited fracture peripheral right posterior 12th rib image 106   series 601 and left T12 superior articular process image 95 series 602      IMPRESSION:     No evidence of acute abdominopelvic process      Stable punctate bilateral nonobstructing calculi  Review of Systems     Review of Systems   Constitutional: Positive for unexpected weight change  Negative for activity change, appetite change, chills, diaphoresis, fatigue and fever  Reports excessive sweating which is new for him   HENT: Negative for facial swelling  Eyes: Negative for discharge  Respiratory: Negative  Negative for cough and shortness of breath  Cardiovascular: Negative for chest pain and leg swelling     Gastrointestinal: Positive for abdominal pain, constipation and nausea  Negative for abdominal distention, blood in stool, diarrhea and vomiting  Endocrine: Negative  Genitourinary: Positive for frequency  Negative for decreased urine volume, difficulty urinating, dysuria, enuresis, flank pain, genital sores, hematuria, penile discharge, penile pain, penile swelling, scrotal swelling, testicular pain and urgency  Musculoskeletal: Negative for back pain and myalgias  Skin: Negative for pallor and rash  Allergic/Immunologic: Negative  Negative for immunocompromised state  Neurological: Negative for facial asymmetry and speech difficulty  Psychiatric/Behavioral: Negative for agitation and confusion  AUA SYMPTOM SCORE    Flowsheet Row Most Recent Value   AUA SYMPTOM SCORE    How often have you had a sensation of not emptying your bladder completely after you finished urinating? 5 (P)     How often have you had to urinate again less than two hours after you finished urinating? 5 (P)     How often have you found you stopped and started again several times when you urinate? 4 (P)     How often have you found it difficult to postpone urination? 3 (P)     How often have you had a weak urinary stream? 5 (P)     How often have you had to push or strain to begin urination? 5 (P)     How many times did you most typically get up to urinate from the time you went to bed at night until the time you got up in the morning? 5 (P)     Quality of Life: If you were to spend the rest of your life with your urinary condition just the way it is now, how would you feel about that? 6 (P)     AUA SYMPTOM SCORE 32 (P)                 Allergies     No Known Allergies    Physical Exam     Physical Exam  Vitals reviewed  Constitutional:       General: He is not in acute distress  Appearance: Normal appearance  He is normal weight  He is not ill-appearing, toxic-appearing or diaphoretic  HENT:      Head: Normocephalic and atraumatic  Eyes:      General: No scleral icterus  Cardiovascular:      Rate and Rhythm: Normal rate  Pulmonary:      Effort: Pulmonary effort is normal  No respiratory distress  Abdominal:      General: Abdomen is flat  There is no distension  Palpations: Abdomen is soft  Tenderness: There is abdominal tenderness  There is no right CVA tenderness, left CVA tenderness, guarding or rebound  Comments: Diffuse abdominal tenderness worse on the right side  No rebound or guarding   Genitourinary:     Penis: Circumcised  No hypospadias, erythema, tenderness, discharge, swelling or lesions  Testes:         Right: Mass, tenderness or swelling not present  Right testis is descended  Left: Mass, tenderness or swelling not present  Left testis is descended  Epididymis:      Right: Not inflamed  No tenderness  Left: Not inflamed  No tenderness  Musculoskeletal:         General: No swelling  Cervical back: Normal range of motion  Lymphadenopathy:      Lower Body: No right inguinal adenopathy  No left inguinal adenopathy  Skin:     General: Skin is warm and dry  Coloration: Skin is not jaundiced or pale  Findings: No rash  Neurological:      General: No focal deficit present  Mental Status: He is alert and oriented to person, place, and time  Gait: Gait normal    Psychiatric:         Mood and Affect: Mood normal          Behavior: Behavior normal          Thought Content:  Thought content normal          Judgment: Judgment normal          Vital Signs     Vitals:    05/26/22 1245   BP: 124/80   Weight: 94 3 kg (208 lb)   Height: 5' 8" (1 727 m)       Current Medications       Current Outpatient Medications:     buprenorphine-naloxone (Suboxone) 8-2 mg, , Disp: , Rfl:     naloxone (NARCAN) 4 mg/0 1 mL nasal spray, INSTILL 1 SPRAY INTO EACH NOSTRIL FOR OVERDOSE as directed, Disp: , Rfl:     ondansetron (Zofran ODT) 4 mg disintegrating tablet, Take 1 tablet (4 mg total) by mouth every 6 (six) hours as needed for nausea or vomiting, Disp: 12 tablet, Rfl: 0    pantoprazole (PROTONIX) 40 mg tablet, Take 1 tablet (40 mg total) by mouth daily before breakfast, Disp: 30 tablet, Rfl: 5    phenazopyridine (PYRIDIUM) 100 mg tablet, Take 1 tablet (100 mg total) by mouth as needed in the morning and 1 tablet (100 mg total) as needed at noon and 1 tablet (100 mg total) as needed in the evening for bladder spasms  , Disp: 10 tablet, Rfl: 0    sucralfate (CARAFATE) 1 g/10 mL suspension, Take 10 mL (1 g total) by mouth as needed in the morning and 10 mL (1 g total) as needed at noon and 10 mL (1 g total) as needed in the evening and 10 mL (1 g total) as needed before bedtime (abdominal pain)  , Disp: 414 mL, Rfl: 0    Active Problems     Patient Active Problem List   Diagnosis    Intervertebral disc disorder with radiculopathy of thoracic region    Closed wedge compression fracture of T11 vertebra with routine healing    Class 1 obesity due to excess calories without serious comorbidity with body mass index (BMI) of 30 0 to 30 9 in adult    Social anxiety disorder    Insomnia due to other mental disorder    Abnormal blood findings    Bilateral high scrotal testicles    Nausea    Nephrolithiasis    Bladder wall thickening       Past Medical History     Past Medical History:   Diagnosis Date    Asthma     Depressed     [facility]        Surgical History     Past Surgical History:   Procedure Laterality Date    APPENDECTOMY      DENTAL SURGERY  03/25/2022       Family History     Family History   Problem Relation Age of Onset    No Known Problems Mother     No Known Problems Father        Social History     Social History     Social History     Tobacco Use   Smoking Status Former Smoker    Packs/day: 1 00    Years: 10 00    Pack years: 10 00    Types: Cigarettes   Smokeless Tobacco Never Used       Past Surgical History:   Procedure Laterality Date    APPENDECTOMY      DENTAL SURGERY  03/25/2022         The following portions of the patient's history were reviewed and updated as appropriate: allergies, current medications, past family history, past medical history, past social history, past surgical history and problem list    Please note :  Voice dictation software has been used to create this document  There may be inadvertent transcription errors      [id number] [id number] Neyda Bose

## 2022-05-26 NOTE — ASSESSMENT & PLAN NOTE
· Bilateral nonobstructing kidney stones measuring 3 millimeters  · Reviewed AUA dietary recommendations and hydration goals  · Follow-up 1 year with imaging prior

## 2022-05-26 NOTE — ASSESSMENT & PLAN NOTE
· Occurred after taking testosterone patches and injections when he was working out at the gym  · Check testosterone level

## 2022-05-26 NOTE — ASSESSMENT & PLAN NOTE
· Diffuse thickening  · Urine microscopic unremarkable  · Consider cystoscopy if symptoms fail to improve

## 2022-05-26 NOTE — PATIENT INSTRUCTIONS
Dietary Management of Kidney Stone Disease    The dietary recommendations for most people who make kidney stones (especially the most common calcium oxalate stones) are uncomplicated and are not too tedious or bland  Most importantly, the following recommendations also promote better health for a variety of reasons  FLUIDS:  The single most important change for the majority patients is the need to greatly increase fluid intake  You should at least produce two liters (about two quarts) of urine each day  Depending on the heat outdoors and your level of physical activity, this usually means consuming ten, 10 ounce glasses (100 ounces) of fluid per day  Water is always a good choice, but other drinks including tea, coffee, soda, and juice are also allowed as long as no one beverage becomes the sole source of fluid  CALCIUM:  There is excellent evidence that calcium should not be avoided, but instead moderated  A range of 600 to 1,100 mg of calcium per day, especially consumed at meals is probably a reasonable target  (i e  2-3 dairy servings per day) This might include small servings of yogurt, milk or ice cream   This amount helps avoid over-absorption of oxalate from the digestive tract and also allows for healthy bone maintenance  SODIUM (SALT): Too much salt in your diet (both from the shaker and in the prepared foods that we buy) is bad for your blood pressure, bad for your heart, and also increases the amount of calcium in your urine  A reasonable sodium restriction to 2,000-2,500mg/day (about the amount in one teaspoon) is an excellent target  You should get into the habit of reading the Nutrients labels on all the foods that you eat and watch out for the foods that have a high sodium content (snack foods, smoked or processed foods, caned foods)  Fresh and frozen foods usually have the least amount of sodium      PROTEIN:  High protein diets from animal meat (beef, chicken, pork, fish) also increases the rate of kidney stone formation and is equally unhealthy for your heart  All patients should moderate their meat intake to 3-7 ounces per day, and particularly stay away from red meat protein  OXALATE:  Most stone-formers should avoid heavy intake of oxalate-rich foods  These include green roughage (spinach, mustard, kale), strawberries, chocolate, tea, iced tea, and nuts  In addition, heavy, excess doses of Vitamin C can also produce surges in urinary oxalate levels and should be avoided  ** THESE FOODS ARE HIGH IN OXALATE - TRY TO LIMIT HOW MUCH OF THESE YOU EAT:  Coke and Pepsi  Nuts  Dark Leafy Greens:     Spinach and Kale, Rhubarb, Chard  Asparagus  Beets  Sweet potatoes   Blueberries, Strawberries   Dark tea (Green tea is okay)  Tofu      DRINK 3 QUARTS (96 Oz ) LIQUIDS EACH DAY - ALL LIQUIDS COUNT        ADD LEMON JUICE 3 OZ  DAILY - Fresh squeezed or lemon juice concentrate - Not MinuteMaid, [place]  [place] ([facility] Hill, Crystal Lite, etc         ** Recipe for RODRIGUEZ'S OLDNEYMAR TYME LEMONADE:         One ounce of lemon juice, glass of water, sweetener of your choice    Coffee is okay! Cranberry juice is good to prevent infections, but does not help for stones  BARE-BONES RECOMMENDATIONS:  Fluids, fluids, fluids  Low salt diet (your primary care doctor will love you)  Moderate red meat intake  Moderate calcium (dairy products), especially with meals  BLADDER HEALTH    WHAT IS CONSIDERED NORMAL? The average bladder can hold about 2 cups of urine before it needs to be emptied  The normal range of voiding urine is 6 to 8 times during a 24 hour period  As we get older, our bladder capacity can get smaller and we may need to pass urine more frequently but usually not more than every 2 hours  Urine should flow easily without discomfort in a good, steady stream until the bladder is empty  No pushing or straining is necessary to empty the bladder      An urge is a signal that you feel as the bladder stretches to fill with urine  Urges can be felt even if the bladder is not full  Urges are not commands to go to the toilet, merely a signal and can be controlled  WHAT ARE GOOD BLADDER HABITS? Take your time when emptying your bladder  Dont strain or push to empty your bladder  Make sure you empty your bladder completely each time you pass urine  Do not rush the process  Consistently ignoring the urge to go (waiting more than 4 hours between toileting) or urinating too infrequently may be convenient but not healthy for your bladder  Avoid going to the toilet just in case or more often than every 2 hours  It is usually not necessary to go when you feel the first urge  Try to go only when your bladder is full  Urgency and frequency of urination can be improved by retraining the bladder and spacing your fluid intake throughout the day  Practice good toilet habits  Dont let your bladder control your life  TIPS TO MAINTAIN GOOD BLADDER HABITS  Maintain a good fluid intake  Depending on your body size and environment, drink 6 -8 cups (8 oz each) of fluid per day unless otherwise advised by your doctor  Not enough fluid creates a foul odor and dark color of the urine  Limit the amount of caffeine (coffee, cola, chocolate or tea) and citrus foods that you consume as these foods can be associated with increased sensation of urinary urgency and frequency  Limit the amount of alcohol you drink  Alcohol increases urine production and makes it difficult for the brain to coordinate bladder control  Avoid constipation by maintaining a balanced diet of dietary fiber  Cigarette smoking is also irritating to the bladder surface and is associated with bladder cancer  In addition, the coughing associated with smoking may lead to increased incontinent episodes because of the increased pressure      HOW DIET CAN AFFECT YOUR BLADDER  Although there is no particular "diet" that can cure bladder control, there are certain dietary suggestions you can use to help control the problem  There are 2 points to consider when evaluating how your habits and diet may affect your bladder:    Foods and fluids can irritate the bladder  Some foods and beverages are thought to contribute to bladder leakage and irritability  However their effect on the bladder is not completely understood and you may want to see if eliminating one or all of these items improves your bladder control  If you are unable to give them up completely, it is recommended that you use the following items in moderation:  Acidic beverages and foods (orange juice, grapefruit juice, lemonade etc)  Alcoholic beverages  Vinegar  Coffee (regular and decaf)  Tea (regular and decaf)  Caffeinated beverages  Carbonated beverages          Drinking enough and the right kinds of fluids  Many people with bladder control issues decrease their intake of liquids in hope that they will need to urinate less frequently or have less urinary leakage  You should not restrict fluids to control your bladder  While a decrease in liquid intake does result in a decrease in the volume of urine, the smaller amount of urine may be more highly concentrated  Highly concentrated, dark yellow urine is irritating to the bladder surface and may actually cause you to go to the bathroom more frequently  It also encourages the growth of bacteria, which may lead to infections resulting in incontinence  Substitutions for Bladder Irritants: water is always the best beverage choice  Grape and apple juice are thirst quenchers are good selections and are not as irritating to the bladder    Low acid fruits:  Pears, apricots, papaya, watermelon  For coffee drinkers: KAVA®, Postum®, Gladys®, Kaffree [place]®  For tea drinkers:  non-citrus or herbal and sun brewed tea

## 2022-05-27 ENCOUNTER — LAB (OUTPATIENT)
Dept: LAB | Facility: CLINIC | Age: 32
End: 2022-05-27
Payer: COMMERCIAL

## 2022-05-27 DIAGNOSIS — R11.0 NAUSEA: ICD-10-CM

## 2022-05-27 DIAGNOSIS — N50.0 BILATERAL TESTICULAR ATROPHY: ICD-10-CM

## 2022-05-27 PROCEDURE — 84260 ASSAY OF SEROTONIN: CPT

## 2022-05-27 PROCEDURE — 84403 ASSAY OF TOTAL TESTOSTERONE: CPT

## 2022-05-27 PROCEDURE — 36415 COLL VENOUS BLD VENIPUNCTURE: CPT

## 2022-05-27 PROCEDURE — 84402 ASSAY OF FREE TESTOSTERONE: CPT

## 2022-05-28 LAB
TESTOST FREE SERPL-MCNC: 4.1 PG/ML (ref 8.7–25.1)
TESTOST SERPL-MCNC: 262 NG/DL (ref 264–916)

## 2022-06-01 ENCOUNTER — TELEPHONE (OUTPATIENT)
Dept: GASTROENTEROLOGY | Facility: CLINIC | Age: 32
End: 2022-06-01

## 2022-06-01 ENCOUNTER — TELEPHONE (OUTPATIENT)
Dept: HEMATOLOGY ONCOLOGY | Facility: CLINIC | Age: 32
End: 2022-06-01

## 2022-06-02 DIAGNOSIS — N28.1 BILATERAL RENAL CYSTS: Primary | ICD-10-CM

## 2022-06-03 ENCOUNTER — TELEPHONE (OUTPATIENT)
Dept: FAMILY MEDICINE CLINIC | Facility: CLINIC | Age: 32
End: 2022-06-03

## 2022-06-03 NOTE — TELEPHONE ENCOUNTER
Left patient a message to call office regarding FMLA forms ( form is in [place] draw) need clarification on dates and job duties that he can and can not due  And current symptoms may need to be seen again in the office to discuss if symptoms are still present         ( spoke to Dr Demetrio Negron about the forms and he did note on last visit with dates being 5/15/22- 5/26/2022 but need to discuss with patient if problem is persisting )
stated

## 2022-06-07 LAB — SEROTONIN PLAS-MCNC: 72 NG/ML (ref 21–321)

## 2022-06-08 ENCOUNTER — OFFICE VISIT (OUTPATIENT)
Dept: GASTROENTEROLOGY | Facility: CLINIC | Age: 32
End: 2022-06-08
Payer: COMMERCIAL

## 2022-06-08 ENCOUNTER — TELEPHONE (OUTPATIENT)
Dept: GASTROENTEROLOGY | Facility: CLINIC | Age: 32
End: 2022-06-08

## 2022-06-08 VITALS
BODY MASS INDEX: 30.92 KG/M2 | HEIGHT: 68 IN | DIASTOLIC BLOOD PRESSURE: 78 MMHG | WEIGHT: 204 LBS | SYSTOLIC BLOOD PRESSURE: 124 MMHG

## 2022-06-08 DIAGNOSIS — R10.30 LOWER ABDOMINAL PAIN: ICD-10-CM

## 2022-06-08 DIAGNOSIS — K59.03 DRUG-INDUCED CONSTIPATION: Primary | ICD-10-CM

## 2022-06-08 DIAGNOSIS — R11.0 NAUSEA: ICD-10-CM

## 2022-06-08 DIAGNOSIS — R63.4 WEIGHT LOSS, UNINTENTIONAL: ICD-10-CM

## 2022-06-08 PROCEDURE — 3008F BODY MASS INDEX DOCD: CPT | Performed by: STUDENT IN AN ORGANIZED HEALTH CARE EDUCATION/TRAINING PROGRAM

## 2022-06-08 PROCEDURE — 1036F TOBACCO NON-USER: CPT | Performed by: STUDENT IN AN ORGANIZED HEALTH CARE EDUCATION/TRAINING PROGRAM

## 2022-06-08 PROCEDURE — 99214 OFFICE O/P EST MOD 30 MIN: CPT | Performed by: STUDENT IN AN ORGANIZED HEALTH CARE EDUCATION/TRAINING PROGRAM

## 2022-06-08 RX ORDER — BUPRENORPHINE HYDROCHLORIDE 8 MG/1
8 TABLET SUBLINGUAL 2 TIMES DAILY
COMMUNITY
Start: 2022-06-01

## 2022-06-08 RX ORDER — POLYETHYLENE GLYCOL 3350 17 G/17G
17 POWDER, FOR SOLUTION ORAL DAILY
Qty: 578 G | Refills: 5 | Status: SHIPPED | OUTPATIENT
Start: 2022-06-08

## 2022-06-08 RX ORDER — DICYCLOMINE HYDROCHLORIDE 10 MG/1
10 CAPSULE ORAL 3 TIMES DAILY PRN
Qty: 90 CAPSULE | Refills: 5 | Status: SHIPPED | OUTPATIENT
Start: 2022-06-08

## 2022-06-08 NOTE — PROGRESS NOTES
[facility] - Outpatient Follow-up Note  Tarik Galvan 27 y o  male MRN: [id number]  Encounter: [id number]          ASSESSMENT AND PLAN:    58-year-old male with history of substance abuse on Subutex here for follow-up of abdominal pain nausea and weight loss  Currently doing better with stabilization of weight and resolution of nausea  CT without contrast on 05/13 and 5/16 without explanation for patient's symptoms  Ultrasound 5/25 with no cholelithiasis  CRP and fecal calprotectin normal   Normal CMP  CBC notable for hemoglobin at around 18 which appears to be baseline  Suspect symptoms were largely driven by Suboxone  Does have residual symptoms which I suspect may have a component of constipation driving these  Will treat constipation and also get bidirectional endoscopy to assess for occult malignancy given significant weight loss  1  Drug-induced constipation  - polyethylene glycol (GLYCOLAX) 17 GM/SCOOP powder; Take 17 g by mouth daily  Dispense: 578 g; Refill: 5    2  Lower abdominal pain  3  Weight loss, unintentional  4  Nausea  - dicyclomine (BENTYL) 10 mg capsule; Take 1 capsule (10 mg total) by mouth 3 (three) times a day as needed (abdominal pain)  Dispense: 90 capsule; Refill: 5  - Colonoscopy; Future  - EGD; Future  -Continue pantoprazole 40 mg daily      ______________________________________________________________________    SUBJECTIVE:    Changed suboxone to subutex  Pain is much improved  Happens 2-3 times a day, happens radnomly but usually first thing in AM  Last up to an hour  Lower abdomen, feels like achy pain, sometimes sharp  Sometimes feels better with eating but not always  Usually feels better with BM  BM are at baseline, has one every other day  BM are usually hard and has to strain  No more vomiting  No issues with taking PO  Taking pantoprazole 40 mg daily  Has lost 20# since symptoms started but weight has now stabilized      Normal CRP, fecal calprotectin, and H pylori stool antigen  REVIEW OF SYSTEMS IS OTHERWISE NEGATIVE  Historical Information   Past Medical History:   Diagnosis Date    Asthma     Depressed     Substance abuse ([address] )      Past Surgical History:   Procedure Laterality Date    APPENDECTOMY      DENTAL SURGERY  03/25/2022     Social History   Social History     Substance and Sexual Activity   Alcohol Use Yes    Comment: monthly     Social History     Substance and Sexual Activity   Drug Use No     Social History     Tobacco Use   Smoking Status Former Smoker    Packs/day: 1 00    Years: 10 00    Pack years: 10 00    Types: Cigarettes   Smokeless Tobacco Never Used     Family History   Problem Relation Age of Onset    No Known Problems Mother     No Known Problems Father        Meds/Allergies       Current Outpatient Medications:     buprenorphine (SUBUTEX) 8 mg    naloxone (NARCAN) 4 mg/0 1 mL nasal spray    pantoprazole (PROTONIX) 40 mg tablet    buprenorphine-naloxone (Suboxone) 8-2 mg    ondansetron (Zofran ODT) 4 mg disintegrating tablet    phenazopyridine (PYRIDIUM) 100 mg tablet    sucralfate (CARAFATE) 1 g/10 mL suspension    No Known Allergies        Objective   /78   Ht 5' 8" (1 727 m)   Wt 92 5 kg (204 lb)   BMI 31 02 kg/m²     PHYSICAL EXAM:      General Appearance:   Alert, cooperative, no distress   HEENT:   Normocephalic, atraumatic, anicteric  Neck:  Supple, symmetrical, trachea midline   Lungs:   Clear to auscultation bilaterally; no rales, rhonchi or wheezing; respirations unlabored    Heart[de-identified]   Regular rate and rhythm; no murmur, rub, or gallop     Abdomen:   Soft, mild diffuse abdominal tenderness with no rebound or guarding, non-distended; normal bowel sounds; no masses, no organomegaly    Genitalia:   Deferred    Rectal:   Deferred    Extremities:  No cyanosis, clubbing or edema    Pulses:  2+ and symmetric    Skin:  No jaundice, rashes, or lesions    Lymph nodes:  No palpable cervical lymphadenopathy        Lab Results:   No visits with results within 1 Day(s) from this visit  Latest known visit with results is:   Lab on 05/27/2022   Component Date Value    Serotonin 05/27/2022 72     Testosterone, Free 05/27/2022 4 1 (A)    TESTOSTERONE TOTAL 05/27/2022 262 (A)         Radiology Results:   CT abdomen pelvis wo contrast    Result Date: 5/16/2022  Narrative: CT ABDOMEN AND PELVIS WITHOUT IV CONTRAST INDICATION:   lower abdominal pain  COMPARISON:  CT abdomen and pelvis 5/13/2022 TECHNIQUE:  CT examination of the abdomen and pelvis was performed without intravenous contrast  This examination was performed without intravenous contrast in the context of the critical nationwide Omnipaque shortage  Axial, sagittal, and coronal 2D reformatted images were created from the source data and submitted for interpretation  Radiation dose length product (DLP) for this visit:  643 37 mGy-cm   This examination, like all CT scans performed in the [facility], was performed utilizing techniques to minimize radiation dose exposure, including the use of iterative  reconstruction and automated exposure control  Enteric contrast was not administered  FINDINGS: ABDOMEN LOWER CHEST:  No clinically significant abnormality identified in the visualized lower chest  LIVER/BILIARY TREE:  Unremarkable  GALLBLADDER:  No calcified gallstones  Subtle fluid fluid level suggestive of gallbladder sludge  No pericholecystic inflammatory change  SPLEEN:  Unremarkable  PANCREAS:  Unremarkable  ADRENAL GLANDS:  Unremarkable  KIDNEYS/URETERS:  Stable punctate bilateral renal lower pole calculi, the larger of which measures 2 mm on the left  Right renal simple cysts, the largest of which measures 1 5 cm  Bilateral renal subcentimeter hypodensities too small to characterize statistically simple cyst  No perinephric collection  STOMACH AND BOWEL:  Unremarkable  APPENDIX:  Post appendectomy  ABDOMINOPELVIC CAVITY:  No ascites  No pneumoperitoneum  No lymphadenopathy  VESSELS:  Unremarkable for patient's age  PELVIS REPRODUCTIVE ORGANS:  Unremarkable for patient's age  URINARY BLADDER:  Unremarkable for degree of distention  ABDOMINAL WALL/INGUINAL REGIONS:  Tiny fat-containing umbilical hernia  OSSEOUS STRUCTURES:  No acute fracture or osseous destructive lesion identified  Mild degenerative changes of the spine  Stable mild chronic compression deformity of T11 and T12  Stable old ununited fracture peripheral right posterior 12th rib image 106 series 601 and left T12 superior articular process image 95 series 602  Impression: No evidence of acute abdominopelvic process  Stable punctate bilateral nonobstructing calculi  Workstation performed: [id number]     CT abdomen pelvis wo contrast    Result Date: 5/13/2022  Narrative: CT ABDOMEN AND PELVIS WITHOUT IV CONTRAST INDICATION:   Suprapubic pain with rebound tenderness  COMPARISON:  None  TECHNIQUE:  CT examination of the abdomen and pelvis was performed without administration of intravenous contrast, limiting evaluation for certain processes  Axial, sagittal, and coronal 2D reformatted images were created from the source data and submitted for interpretation  Radiation dose length product (DLP) for this visit:  694 14 mGy-cm   This examination, like all CT scans performed in the [facility], was performed utilizing techniques to minimize radiation dose exposure, including the use of iterative  reconstruction and automated exposure control  IV Contrast: Because of a critical nationwide shortage, this study was performed without intravenous contrast  Enteric Contrast:  Enteric contrast was not administered  FINDINGS: ABDOMEN LOWER CHEST: No clinically significant abnormality is identified in the visualized lower chest  No consolidation or effusion  LIVER: Normal size and morphology  No gross abnormality on noncontrast study   BILIARY: No intrahepatic biliary ductal dilatation  Normal caliber common bile duct  GALLBLADDER: No calcified gallstones  Normal wall thickness  No pericholecystic inflammatory changes  SPLEEN: Within normal limits  No gross mass on noncontrast study  Normal spleen size  PANCREAS: Pancreatic parenchyma is within normal limits  No main pancreatic ductal dilatation  No peripancreatic inflammation  ADRENAL GLANDS: Within normal limits  KIDNEYS/URETERS: Normal size and position  Multiple bilateral simple cysts as well as multiple sharply circumscribed renal hypodensities, too small to accurately characterize, which are statistically benign findings requiring no further workup or follow-up (Reference: Radiology 2019 292:2, 219-183)  No suspicious solid mass on noncontrast exam  No hydronephrosis  There is a single punctate calculus at the inferior pole of each kidney  Ureters within normal limits  STOMACH AND BOWEL: The stomach is distended with fluid and air but otherwise within normal limits  Normal caliber small bowel  Normal caliber large bowel  No evidence of active small or large bowel inflammatory process  APPENDIX: Appendectomy  ABDOMINOPELVIC CAVITY: No ascites  No intraperitoneal free air  No lymphadenopathy  No retroperitoneal hematoma  VESSELS: Normal caliber abdominal aorta with no detectable atherosclerotic plaque  PELVIS REPRODUCTIVE ORGANS:  Normal prostate  Symmetric seminal vesicles  URINARY BLADDER:  Diffuse bladder wall thickening consistent with sequela of chronic cystitis and/or chronic outlet obstruction  No calculi  ABDOMINAL WALL/INGUINAL REGIONS:  There is a small fat-containing umbilical hernia  BONES:  Chronic mild anterior wedging of T11-L1  Impression: 1  No acute findings in the abdomen or pelvis  2   Diffuse bladder wall thickening consistent with sequela of chronic cystitis and/or chronic outlet obstruction  3   Punctate nonobstructive bilateral renal calculi   The study was marked in [facility] for significant notification  Workstation performed: [id number]     US abdomen complete    Result Date: 5/29/2022  Narrative: ABDOMEN ULTRASOUND, COMPLETE INDICATION:   R10 9: Unspecified abdominal pain  COMPARISON:  CT abdomen pelvis 5/16/2022 TECHNIQUE:   Real-time ultrasound of the abdomen was performed with a curvilinear transducer with both volumetric sweeps and still imaging techniques  FINDINGS: PANCREAS:  Portions of the pancreas are obscured by bowel gas  Visualized portions of the pancreas are unremarkable  AORTA AND IVC:  Visualized portions are normal for patient age  LIVER: Size:  Within normal range  The liver measures 15 7 cm in the midclavicular line  Contour:  Surface contour is smooth  Parenchyma:  Echogenicity and echotexture are within normal limits  No liver mass identified  Limited imaging of the main portal vein shows it to be patent and hepatopetal  BILIARY: No gallbladder findings  No intrahepatic biliary dilatation  CBD measures 6 0 mm  No choledocholithiasis  KIDNEY: Right kidney measures 11 4 x 6 3 x 7 0  cm  Volume 264 3 mL There are mildly complicated right renal cysts measuring up to 1 6 cm at the midpole right kidney  Left kidney measures 11 5 x 5 5 x 5 7 cm  Volume 331 3 mL Mildly complicated left renal cysts measuring up to 1 cm at the midpole  SPLEEN: Measures 11 5 x 10 6 x 4 2 cm  Volume 271 3 mL Within normal limits  ASCITES:  None  Impression: 1  No sonographic evidence of acute intra-abdominal inflammatory process  2   Bilateral mildly complicated renal cysts   Workstation performed: [id number]     Answers for HPI/ROS submitted by the patient on 6/7/2022  Pain - numeric: 5/10

## 2022-06-08 NOTE — TELEPHONE ENCOUNTER
Scheduled date of EGD/colonoscopy (as of today):8/31/22  Physician performing EGD/colonoscopy:Ronna  Location of EGD/colonoscopy:[place]  Desired bowel prep reviewed with patient:Miralax/mag citrate  Instructions reviewed with patient by:Leah  Clearances:  none

## 2022-06-22 DIAGNOSIS — M25.569 KNEE PAIN, UNSPECIFIED CHRONICITY, UNSPECIFIED LATERALITY: Primary | ICD-10-CM

## 2022-06-30 NOTE — PROGRESS NOTES
34 y o male presents for initial evaluation of bilateral knee pain  He states his right knee is worse than his left knee  He also goes on to state that has multiple areas of pain and include both ankle both knees both hand  He states that his hands are stiff in the morning and difficult to open up  He denies gross locking or triggering of his hands  He states his knee pain is anterior type knee pain without locking or giving way  He notes he has some clicking in his knees  Notes varying days the ankles will bother him as well  Patient does have a history of substance abuse and social anxiety disorder  He has seen pain management in the past   He has also had previous HIV antigen hepatitis-C antigen Lyme antibody sed rate rheumatoid factor C reactive protein and YANIV all negative  Patient works at [employer] as a   He has not seen Rheumatology in the past for his multiple arthralgias  Review of Systems  Review of systems negative unless otherwise specified in HPI    Past Medical History  Past Medical History:   Diagnosis Date    Asthma     Depressed     Substance abuse ([facility] [address]      Past Surgical History  Past Surgical History:   Procedure Laterality Date    APPENDECTOMY      DENTAL SURGERY  03/25/2022     Current Medications  Current Outpatient Medications on File Prior to Visit   Medication Sig Dispense Refill    buprenorphine (SUBUTEX) 8 mg 8 mg 2 (two) times a day      dicyclomine (BENTYL) 10 mg capsule Take 1 capsule (10 mg total) by mouth 3 (three) times a day as needed (abdominal pain) 90 capsule 5    naloxone (NARCAN) 4 mg/0 1 mL nasal spray INSTILL 1 SPRAY INTO EACH NOSTRIL FOR OVERDOSE as directed      pantoprazole (PROTONIX) 40 mg tablet Take 1 tablet (40 mg total) by mouth daily before breakfast 30 tablet 5    polyethylene glycol (GLYCOLAX) 17 GM/SCOOP powder Take 17 g by mouth daily 578 g 5     No current facility-administered medications on file prior to visit  Recent Labs [facility] ARCENIO)  0   Lab Value Date/Time    HCT 54 7 (H) 05/16/2022 0733    HGB 18 6 (H) 05/16/2022 0733    WBC 7 73 05/16/2022 0733    ESR 1 05/05/2021 0840    CRP <3 0 05/20/2022 1305     Physical exam  Body mass index is 30 56 kg/m²  · General: Awake, Alert, Oriented  · Eyes: Pupils equal, round and reactive to light  · Heart: regular rate and rhythm  · Lungs: No audible wheezing  · Abdomen: soft  bilateral Knee exam  · No swelling or ecchymosis redness or warmth nor signs of infection  No intra-articular effusion  Locates his pain in the anterior knee  He has positive medial patellar facet tenderness bilaterally  There is slight patellofemoral crepitation  He has no significant joint line tenderness of either knee  There is no long bone deformity  Range of motion is 0-125 degrees  There is no ligamentous instability  He has negative Dianna's testing  Procedure  None today    Imaging  I personally reviewed x-rays both knees taken the office today which note slight lateral displacement of the patella is on sunrise you with narrowing of the lateral patella facet area  1  Patellofemoral syndrome of both knees    2  Arthralgia, unspecified joint    3  Left knee pain, unspecified chronicity    4  Right knee pain, unspecified chronicity    5  Knee pain, unspecified chronicity, unspecified laterality      Assessment:  bilateral knee patellofemoral syndrome with multiple arthralgias and negative lab work in the past   Rule out rheumatological disease  Plan:  Would get rheumatology evaluation due to his is multiple arthralgias and complaints a young 76-year-old  Would start the patient in formal physical therapy for his patellofemoral syndrome  Copy of note to the PCP  Follow-up on a p r n  basis 6-8 weeks of physical therapy  Ice 20 minutes to 3 times a day and over-the-counter pain meds as needed bilateral knee pain    It was discussed with the patient that patellofemoral syndrome is difficult to treat in the main treatment is physical therapy  This note was created with voice recognition software

## 2022-07-06 ENCOUNTER — APPOINTMENT (OUTPATIENT)
Dept: RADIOLOGY | Facility: MEDICAL CENTER | Age: 32
End: 2022-07-06
Payer: COMMERCIAL

## 2022-07-06 ENCOUNTER — OFFICE VISIT (OUTPATIENT)
Dept: OBGYN CLINIC | Facility: CLINIC | Age: 32
End: 2022-07-06
Payer: COMMERCIAL

## 2022-07-06 VITALS
HEART RATE: 69 BPM | SYSTOLIC BLOOD PRESSURE: 123 MMHG | BODY MASS INDEX: 30.56 KG/M2 | WEIGHT: 201 LBS | DIASTOLIC BLOOD PRESSURE: 80 MMHG

## 2022-07-06 DIAGNOSIS — M25.561 RIGHT KNEE PAIN, UNSPECIFIED CHRONICITY: ICD-10-CM

## 2022-07-06 DIAGNOSIS — M22.2X1 PATELLOFEMORAL SYNDROME OF BOTH KNEES: Primary | ICD-10-CM

## 2022-07-06 DIAGNOSIS — M25.50 ARTHRALGIA, UNSPECIFIED JOINT: ICD-10-CM

## 2022-07-06 DIAGNOSIS — M25.562 LEFT KNEE PAIN, UNSPECIFIED CHRONICITY: ICD-10-CM

## 2022-07-06 DIAGNOSIS — M22.2X2 PATELLOFEMORAL SYNDROME OF BOTH KNEES: Primary | ICD-10-CM

## 2022-07-06 DIAGNOSIS — M25.569 KNEE PAIN, UNSPECIFIED CHRONICITY, UNSPECIFIED LATERALITY: ICD-10-CM

## 2022-07-06 PROCEDURE — 73562 X-RAY EXAM OF KNEE 3: CPT

## 2022-07-06 PROCEDURE — 99213 OFFICE O/P EST LOW 20 MIN: CPT | Performed by: PHYSICIAN ASSISTANT

## 2022-07-06 NOTE — PATIENT INSTRUCTIONS
Would get rheumatology evaluation due to his is multiple arthralgias and complaints a young 54-year-old  Would start the patient in formal physical therapy for his patellofemoral syndrome  Copy of note to the PCP  Follow-up on a p r n  basis 6-8 weeks of physical therapy  Ice 20 minutes to 3 times a day and over-the-counter pain meds as needed bilateral knee pain  It was discussed with the patient that patellofemoral syndrome is difficult to treat in the main treatment is physical therapy

## 2022-07-07 DIAGNOSIS — M22.2X1 PATELLOFEMORAL SYNDROME OF BOTH KNEES: Primary | ICD-10-CM

## 2022-07-07 DIAGNOSIS — M22.2X2 PATELLOFEMORAL SYNDROME OF BOTH KNEES: Primary | ICD-10-CM

## 2022-07-07 RX ORDER — IBUPROFEN 800 MG/1
800 TABLET ORAL EVERY 8 HOURS PRN
Qty: 30 TABLET | Refills: 1 | Status: SHIPPED | OUTPATIENT
Start: 2022-07-07

## 2022-07-07 NOTE — PROGRESS NOTES
Patient requesting anti-inflammatories  No known allergies  Ibuprofen 800 milligrams q 8 hours prescription sent to pharmacy and patient made aware to take these with food

## 2022-07-11 ENCOUNTER — EVALUATION (OUTPATIENT)
Dept: PHYSICAL THERAPY | Facility: CLINIC | Age: 32
End: 2022-07-11
Payer: COMMERCIAL

## 2022-07-11 DIAGNOSIS — M22.2X1 PATELLOFEMORAL SYNDROME OF BOTH KNEES: Primary | ICD-10-CM

## 2022-07-11 DIAGNOSIS — M24.551 RIGHT HIP FLEXOR TIGHTNESS: ICD-10-CM

## 2022-07-11 DIAGNOSIS — M62.89 HAMSTRING TIGHTNESS OF RIGHT LOWER EXTREMITY: ICD-10-CM

## 2022-07-11 DIAGNOSIS — M22.2X2 PATELLOFEMORAL SYNDROME OF BOTH KNEES: Primary | ICD-10-CM

## 2022-07-11 DIAGNOSIS — M24.552 HIP FLEXOR TIGHTNESS, LEFT: ICD-10-CM

## 2022-07-11 DIAGNOSIS — M62.89 HAMSTRING TIGHTNESS OF LEFT LOWER EXTREMITY: ICD-10-CM

## 2022-07-11 PROCEDURE — 97110 THERAPEUTIC EXERCISES: CPT

## 2022-07-11 PROCEDURE — 97161 PT EVAL LOW COMPLEX 20 MIN: CPT

## 2022-07-11 NOTE — PROGRESS NOTES
PT Evaluation     Today's date: 2022  Patient name: Dary Rainey  : 1990  MRN: [id number]  Referring provider: Og Gabriel PA-C  Dx:   Encounter Diagnosis     ICD-10-CM    1  Patellofemoral syndrome of both knees  M22 2X1 Ambulatory Referral to Physical Therapy    M22 2X2    2  Hamstring tightness of left lower extremity  M62 89    3  Hamstring tightness of right lower extremity  M62 89    4  Right hip flexor tightness  M24 551    5  Hip flexor tightness, left  M24 552        Start Time: 1420  Stop Time: 1500  Total time in clinic (min): 40 minutes    Assessment  Assessment details: Patient is a 34 y o  male presenting to initial examination with chief complaint of B/L knee pain R >L ongoing for the past few years with worsening/exacerbation in the past 3 months  Signs and symptoms are consistent with patellorfemoral syndrome due to BLE hamstring and hip flexor tightness  Primary impairments include significant BLE hamstring and hip flexor tightness; however, pt does demonstrate good BLE strength  As a result of impairments patient experiences limitations with functional/daily activities including sitting, prolonged walking, and use of steps  Patient achieved FOTO score of 62  Educated patient regarding plan of care and answered all patient questions to patient satisfaction  Patient would benefit from skilled PT interventions to address above impairments in order to maximize functional capacity  Thank you for the referral     Impairments: abnormal coordination, abnormal gait, abnormal muscle firing, abnormal muscle tone, abnormal or restricted ROM, abnormal movement, activity intolerance, impaired balance, impaired physical strength, lacks appropriate home exercise program, pain with function and weight-bearing intolerance  Understanding of Dx/Px/POC: good   Prognosis: good    Goals  Impairment Goals: 4-6 weeks  - Patient to decrease pain to 0-2/10 at worst for improved activity tolerance  Functional Goals: by discharge  - Patient to discharge to independent HEP  - Patient to increase FOTO to 77 for improved overall functional mobility    - Patient to have improved BLE hamstring/hip flexor flexibility to [facility] t/o for improved patellar tracking with mobility    - Patient to have no difficulty with prolonged sitting, prolonged walking, or use of steps  Plan  Patient would benefit from: skilled physical therapy  Referral necessary: Yes  Planned modality interventions: cryotherapy and thermotherapy: hydrocollator packs  Planned therapy interventions: joint mobilization, manual therapy, massage, Ornelas taping, motor coordination training, neuromuscular re-education, orthotic fitting/training, patient education, postural training, strengthening, stretching, therapeutic activities, therapeutic exercise, therapeutic training, flexibility, functional ROM exercises, gait training, home exercise program, body mechanics training, behavior modification, balance/weight bearing training, balance, activity modification and abdominal trunk stabilization  Frequency: 2x week  Duration in visits: 12  Duration in weeks: 6  Plan of Care beginning date: 7/11/2022  Plan of Care expiration date: 8/22/2022  Treatment plan discussed with: patient        Subjective Evaluation    History of Present Illness  Mechanism of injury: WORK/SCHOOL:  FT  at [address] [place] [address]:  Lives with fiance and children in 2 story house   HOBBIES/EXERCISE: anything outside   PLOF: no prior trauma or accident, IND with all ADLs   HISTORY OF CURRENT INJURY: Pt reports of having B/L knee pain ongoing for at least 3 years with insidious onset and exacerbated in the past few months, R >L  Pt reports that he recalls of no specific incident that could've possibly triggered his symptoms  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 and is an  at [employer] [facility] [employer] for the past year   Had xrays taken, showing patello femoral syndrome, stating his knee caps were "off center," and that he has a referral for a rheumatologist but wasn't available until December  PAIN LOCATION/DESCRIPTORS: sides of B/L kneecaps, throbbing pain   AGGRAVATING FACTORS:  Sitting, steps, throbs usually after prolonged walking   EASES: ice, has attempted ibuprofen with not much change   DAY PATTERN: dependent on activity level   IMAGING:  xrays showing patello femoral syndrome, knee caps "off center"   SPECIAL QUESTIONS:    Marychuy Plover reports of having a numbness in lower lateral thigh region, for about a year now  Pt does report of having prior back issues but it hasn't been an issue for him lately  PATIENT GOALS:  To be pain free             Recurrent probem    Quality of life: good    Pain  Current pain ratin  At best pain ratin  At worst pain ratin  Quality: throbbing  Relieving factors: ice and change in position  Aggravating factors: sitting, stair climbing and walking  Progression: worsening    Social Support  Steps to enter house: yes  Stairs in house: yes   Lives in: multiple-level home  Lives with: significant other and young children    Employment status: working  Hand dominance: right  Exercise history: does a lot of outdoor activities       Diagnostic Tests  X-ray: abnormal (patello femoral syndrome)  Treatments  Current treatment: physical therapy  Patient Goals  Patient goals for therapy: decreased pain, increased motion, increased strength and return to sport/leisure activities          Objective     Tenderness   Left Knee   Tenderness in the inferior patella  Right Knee   Tenderness in the lateral patella, medial joint line and medial patella  Active Range of Motion   Left Knee   Normal active range of motion  Flexion: 135 degrees   Extension: 0 degrees     Right Knee   Flexion: 132 degrees   Extension: 0 degrees     Mobility   Patellar Mobility:   Left Knee   WFL: medial, lateral, superior and inferior       Right Knee Hypomobile: medial, lateral, superior and inferior     Tests     Left Knee   Positive patella-femoral grind  Right Knee   Positive patella-femoral grind  Additional Tests Details  Modified Oz (+) B/L, positive for B/L hip flexor tightness       General Comments:      Knee Comments  Hamstrings:  R: 62 degrees  L: 64 degrees       Flowsheet Rows    Flowsheet Row Most Recent Value   PT/OT G-Codes    Current Score 62   Projected Score 77                Date 7/11/22        Visit Count 1       FOTO completed       Pain In         Pain Out          Diagnosis: B/L patellofemoral syndrome, B/L hamstring and hp flexor tightness    Precautions: none    Primary impairments: B/L hamstring/hip flexor tightness    *asterisks by exercise = given for HEP    7/11/22       Manuals                                                There Ex        Upright bike vs recumbent bike        3 way heel raises        Mini squats (with chair behind pt)        F/L stepups onto 6" or 8" step        Fwd lunges onto bosu        Sidestepping with theraband         Monster walk with theraband         Reverse lunge        Supine 3 way SLR with quad set         Bridge with add squeeze        Bridge with hip abd with theraband        S/L clamshell with theraband        Self hamstring stretch on steps         Self hip flexor stretch on steps         Supine self hamstring stretch         Supine self hip flexor stretch with strap over side edge of mat        Neuro Re-Ed                                                                                                        Re-evaluation             Ther Act/Gait                                         Modalities                                                   HEP Access Code [email or web address]

## 2022-07-18 ENCOUNTER — TELEPHONE (OUTPATIENT)
Dept: GASTROENTEROLOGY | Facility: MEDICAL CENTER | Age: 32
End: 2022-07-18

## 2022-07-19 ENCOUNTER — APPOINTMENT (OUTPATIENT)
Dept: PHYSICAL THERAPY | Facility: CLINIC | Age: 32
End: 2022-07-19
Payer: COMMERCIAL

## 2022-07-22 ENCOUNTER — TELEPHONE (OUTPATIENT)
Dept: OBGYN CLINIC | Facility: CLINIC | Age: 32
End: 2022-07-22

## 2022-07-22 NOTE — TELEPHONE ENCOUNTER
----- Message from Richard Barragan sent at 7/21/2022 11:00 AM EDT -----  Spoke with patient and advised him of the message that Fely had sent from below  Patient stated that he would like to see hand in regards to this  I advised that someone would be contacting him to make an appointment    ----- Message -----  From: Hunter Baron PA-C  Sent: 7/20/2022   3:01 PM EDT  To: Richard Barragan    Patient needs office visit either with hand surgery or with me for evaluation of his hand paresthesias which may be carpal tunnel and possible injection prior to EMG    Please schedule and let patient know

## 2022-07-22 NOTE — TELEPHONE ENCOUNTER
LMOM  to schedule, if pt calls back main line please send me a teams message to ensure I am available and then transfer pt to my line [phone]  Thank you

## 2022-07-25 ENCOUNTER — TELEPHONE (OUTPATIENT)
Dept: NEPHROLOGY | Facility: CLINIC | Age: 32
End: 2022-07-25

## 2022-07-25 ENCOUNTER — TELEPHONE (OUTPATIENT)
Dept: HEMATOLOGY ONCOLOGY | Facility: CLINIC | Age: 32
End: 2022-07-25

## 2022-07-25 DIAGNOSIS — N20.0 NEPHROLITHIASIS: Primary | ICD-10-CM

## 2022-07-26 ENCOUNTER — TELEPHONE (OUTPATIENT)
Dept: HEMATOLOGY ONCOLOGY | Facility: CLINIC | Age: 32
End: 2022-07-26

## 2022-07-26 ENCOUNTER — APPOINTMENT (OUTPATIENT)
Dept: PHYSICAL THERAPY | Facility: CLINIC | Age: 32
End: 2022-07-26
Payer: COMMERCIAL

## 2022-08-29 ENCOUNTER — ANESTHESIA (OUTPATIENT)
Dept: ANESTHESIOLOGY | Facility: HOSPITAL | Age: 32
End: 2022-08-29

## 2022-08-29 ENCOUNTER — ANESTHESIA EVENT (OUTPATIENT)
Dept: ANESTHESIOLOGY | Facility: HOSPITAL | Age: 32
End: 2022-08-29

## 2022-08-29 NOTE — ANESTHESIA PREPROCEDURE EVALUATION
Procedure:  PRE-OP ONLY    EGD and colonoscopy for unitentional weight loss   ECG: ST  Relevant Problems   /RENAL   (+) Nephrolithiasis      NEURO/PSYCH   (+) Social anxiety disorder             Anesthesia Plan  ASA Score- 2     Anesthesia Type- IV sedation with anesthesia with ASA Monitors  Additional Monitors:   Airway Plan:           Plan Factors-    Chart reviewed  EKG reviewed  Existing labs reviewed  Patient summary reviewed              Induction-     Postoperative Plan-     Informed Consent-

## 2022-10-30 ENCOUNTER — OCCMED (OUTPATIENT)
Dept: URGENT CARE | Facility: CLINIC | Age: 32
End: 2022-10-30

## 2022-10-30 ENCOUNTER — APPOINTMENT (OUTPATIENT)
Dept: RADIOLOGY | Facility: CLINIC | Age: 32
End: 2022-10-30

## 2022-10-30 DIAGNOSIS — M79.642 LEFT HAND PAIN: ICD-10-CM

## 2022-10-30 DIAGNOSIS — S67.22XA CRUSHING INJURY OF LEFT HAND, INITIAL ENCOUNTER: Primary | ICD-10-CM

## 2022-10-31 DIAGNOSIS — E29.1 HYPOGONADISM IN MALE: Primary | ICD-10-CM

## 2022-11-03 ENCOUNTER — APPOINTMENT (OUTPATIENT)
Dept: URGENT CARE | Facility: CLINIC | Age: 32
End: 2022-11-03

## 2022-11-10 ENCOUNTER — APPOINTMENT (OUTPATIENT)
Dept: URGENT CARE | Facility: CLINIC | Age: 32
End: 2022-11-10

## 2022-11-20 ENCOUNTER — OCCMED (OUTPATIENT)
Dept: URGENT CARE | Facility: CLINIC | Age: 32
End: 2022-11-20

## 2022-11-20 DIAGNOSIS — S67.22XD: Primary | ICD-10-CM

## 2022-12-21 ENCOUNTER — OFFICE VISIT (OUTPATIENT)
Dept: RHEUMATOLOGY | Facility: CLINIC | Age: 32
End: 2022-12-21

## 2022-12-21 VITALS
HEIGHT: 68 IN | DIASTOLIC BLOOD PRESSURE: 80 MMHG | WEIGHT: 218.3 LBS | SYSTOLIC BLOOD PRESSURE: 134 MMHG | BODY MASS INDEX: 33.08 KG/M2

## 2022-12-21 DIAGNOSIS — M79.671 FOOT PAIN, BILATERAL: ICD-10-CM

## 2022-12-21 DIAGNOSIS — R53.83 MALAISE AND FATIGUE: ICD-10-CM

## 2022-12-21 DIAGNOSIS — M47.819 SPONDYLOARTHRITIS: Primary | ICD-10-CM

## 2022-12-21 DIAGNOSIS — G89.29 CHRONIC BILATERAL BACK PAIN, UNSPECIFIED BACK LOCATION: ICD-10-CM

## 2022-12-21 DIAGNOSIS — M25.562 CHRONIC PAIN OF BOTH KNEES: ICD-10-CM

## 2022-12-21 DIAGNOSIS — M25.561 CHRONIC PAIN OF BOTH KNEES: ICD-10-CM

## 2022-12-21 DIAGNOSIS — M54.9 CHRONIC BILATERAL BACK PAIN, UNSPECIFIED BACK LOCATION: ICD-10-CM

## 2022-12-21 DIAGNOSIS — M79.642 BILATERAL HAND PAIN: ICD-10-CM

## 2022-12-21 DIAGNOSIS — G89.29 CHRONIC PAIN OF BOTH KNEES: ICD-10-CM

## 2022-12-21 DIAGNOSIS — M79.672 FOOT PAIN, BILATERAL: ICD-10-CM

## 2022-12-21 DIAGNOSIS — M25.50 ARTHRALGIA, UNSPECIFIED JOINT: ICD-10-CM

## 2022-12-21 DIAGNOSIS — R53.81 MALAISE AND FATIGUE: ICD-10-CM

## 2022-12-21 DIAGNOSIS — M79.641 BILATERAL HAND PAIN: ICD-10-CM

## 2022-12-21 RX ORDER — MELOXICAM 15 MG/1
15 TABLET ORAL DAILY
Qty: 30 TABLET | Refills: 2 | Status: SHIPPED | OUTPATIENT
Start: 2022-12-21

## 2022-12-21 RX ORDER — COVID-19 ANTIGEN TEST
KIT MISCELLANEOUS
COMMUNITY
Start: 2022-11-29

## 2022-12-21 RX ORDER — BUSPIRONE HYDROCHLORIDE 10 MG/1
10 TABLET ORAL 3 TIMES DAILY PRN
COMMUNITY
Start: 2022-12-01

## 2022-12-21 NOTE — PROGRESS NOTES
Rheumatology Consult   Angelika Catherine 34 y o  male 1990    DATE: 12/21/2022    Reason for Consult: back pain, knee pain and hand pain    Assessment and Plan:  Spondyloarthropathy with peripheral joint involvement  Check HLA B27, xray SI joints  Topical NSAIDs/analgesics PRN joint/back pain  PT for LS spine and knees  Meloxicam 15mg daily for 30 days  Check Hep serologies and Quantiferon in anticipation of future biologic therapy  Anti-inflammatory diet/exercise/weight control  Increase CV fitness/aerobic activity  Patient to continue to monitor symptoms  Topical NSAIDs/analgesics PRN joint pain  Continue to monitor labs including CRP    History of Present Illness:  Angelika Catherine is a 34 y o  male Here for initial eval of chronic neck, back pain  He has had epidural CS injections  Xrays of T-spine, LS spine reviewed  MRI T-spine reviewed as well  Xrays of hands and knees reviewed as well  He has inflammatory back pain since his 20's  He has hand and knee pain, swelling and stiffness  +anorexia  No psoriasis, however, erythematous rash with flaking  No inflammatory eye disease  He does not smoke  He feels better with activity  Review of Systems  Review of Systems   Constitutional: Positive for fatigue  HENT: Negative for mouth sores  Respiratory: Negative for cough and shortness of breath  Cardiovascular: Negative for chest pain and leg swelling  Gastrointestinal: Negative for abdominal pain, constipation and diarrhea  Musculoskeletal: Positive for arthralgias, back pain, joint swelling, neck pain and neck stiffness  Negative for myalgias  Skin: Negative for color change and rash  Neurological: Negative for weakness  Hematological: Negative for adenopathy  Psychiatric/Behavioral: Negative for sleep disturbance         Allergies  No Known Allergies    Current Medications      Past Medical History  Past Medical History:   Diagnosis Date   • Asthma    • Depressed    • Substance abuse [facility])        Past Surgical History  Past Surgical History:   Procedure Laterality Date   • APPENDECTOMY     • DENTAL SURGERY  03/25/2022       Family History  No known autoimmune or inflammatory diseases in the family  Family History   Problem Relation Age of Onset   • No Known Problems Mother    • No Known Problems Father        Social History  Occupation:   Social History     Substance and Sexual Activity   Alcohol Use Yes    Comment: monthly     Social History     Substance and Sexual Activity   Drug Use No     Social History     Tobacco Use   Smoking Status Former   • Packs/day: 1 00   • Years: 10 00   • Pack years: 10 00   • Types: Cigarettes   Smokeless Tobacco Never        Objective:  /80   Ht 5' 8" (1 727 m)   Wt 99 kg (218 lb 4 8 oz)   BMI 33 19 kg/m²     Physical Exam  Constitutional:       General: He is not in acute distress  HENT:      Head: Normocephalic and atraumatic  Eyes:      Conjunctiva/sclera: Conjunctivae normal    Cardiovascular:      Rate and Rhythm: Normal rate and regular rhythm  Heart sounds: S1 normal and S2 normal      No friction rub  Pulmonary:      Effort: Pulmonary effort is normal  No respiratory distress  Breath sounds: Normal breath sounds  No wheezing, rhonchi or rales  Musculoskeletal:      Right wrist: Tenderness present  Left wrist: Tenderness present  Right hand: Tenderness present  Decreased range of motion  Left hand: Tenderness present  Decreased range of motion  Cervical back: Neck supple  Right ankle: Tenderness present  Decreased range of motion  Left ankle: Tenderness present  Decreased range of motion  Right foot: Decreased range of motion  Tenderness present  Left foot: Decreased range of motion  Tenderness present  Skin:     General: Skin is warm and dry  Coloration: Skin is not pale  Findings: No rash  Neurological:      Mental Status: He is alert   Mental status is at baseline  Psychiatric:         Mood and Affect: Mood normal          Behavior: Behavior normal             Lab Results: I have personally reviewed pertinent reports        CBC:   , Chemistry Profile:       Invalid input(s): ALBUMIN, Coagulation Studies:   , Cardiac Studies:   , Additional Labs:   , iSTAT CHEM 8:       Invalid input(s): POTASSIUMIS, ABG:   , Toxicology:   , Last A1C/Lipid Panel/Thyroid Panel:   Lab Results   Component Value Date    TRIG 68 08/12/2021    HDL 45 08/12/2021    LDLCALC 108 (H) 08/12/2021    [id number] 0 947 08/12/2021     Lab Results   Component Value Date    CRP <3 0 05/20/2022    YANIV Negative 05/05/2021    RF Negative 05/05/2021    HEPCAB Non-reactive 08/12/2021           Invalid input(s): URIBILINOGEN         Imaging: I have personally reviewed pertinent films in PACS

## 2022-12-21 NOTE — PATIENT INSTRUCTIONS
Take the meloxicam every day with food or after a meal   I ordered non-fasting labs and an xray of you sacroiliac joints  Continue to eat healthy and exercise every day (gentle low back stretching exercises)  You can try Salonpas lidocaine patches and apply to your lower back and leave on for 12 hours  You can also try Voltaren gel and apply it to your painful hands and knees up to 4 times per day

## 2022-12-22 DIAGNOSIS — Z12.11 COLON CANCER SCREENING: Primary | ICD-10-CM

## 2023-01-09 ENCOUNTER — TELEPHONE (OUTPATIENT)
Dept: OTHER | Facility: OTHER | Age: 33
End: 2023-01-09

## 2023-01-23 NOTE — PROGRESS NOTES
Name: Hilaria Cisneros      : 1990      MRN: [id number]  Encounter Provider: Marciano Thompson MD  Encounter Date: 2023   Encounter department: [address] [place] [address]   Patient has signs and symptoms of sinusitis and pharyngitis, as well as serous otitis media though the latter is not bothering him  May have eustachian tube dysfunction  Will hold off on steroids for serous otitis media at this time  This area has resistance to amoxicillin, will give azithromycin to cover the most likely causative organisms  Also will refill albuterol  Given patient is going for endoscopy-colonoscopy and has possible symptoms of GERD, will re-evaluate within the next 1-2 weeks to discuss results  1  Pharyngitis, unspecified etiology  -     azithromycin (Zithromax) 250 mg tablet; Take 2 tablets (500 mg total) by mouth daily for 1 day, THEN 1 tablet (250 mg total) daily for 4 days  2  Mild intermittent asthma without complication  -     albuterol (Proventil HFA) 90 mcg/act inhaler; Inhale 2 puffs every 6 (six) hours as needed for wheezing or shortness of breath    3  Acute non-recurrent sinusitis, unspecified location  -     azithromycin (Zithromax) 250 mg tablet; Take 2 tablets (500 mg total) by mouth daily for 1 day, THEN 1 tablet (250 mg total) daily for 4 days  4  Non-recurrent acute serous otitis media of right ear           Subjective      CC: new inhaler and throat pain  x2 weeks of sore throat burning pain 7/10 at worst, worst in morning and with swallowing  Slightly relieved throughout the morning and with halls cough drops and chloroseptic spray  Notes chills, wakes up in night 4-0S/PKYQH with metallic taste in mouth, tried OTC prilosec but it made him feel sick  Occasionally feels like back of throat swells especially the uvula but no compromised breathing  Denies cough, nausea/vomiting, constipation/diarrhea, abdominal pain   9yo son was ill (covid home test negative) weeks ago  Patient home tested covid (-) and is vaccinated x2  Not taken any antibiotics for this before  Hx: asthma, opioid use disorder, back spondyloarthropathy treated with corticosteroid injections, nephrolithiasis, class I obesity, social anxiety disorder, insomnia  Meds: buspirone prn, meloxicam, naloxone, buprenorphine 8mg which judging by PDMP seems to be filled appropriately at 30 day intervals, albuterol inhaler  Surgical: appendectomy, dental 2 front teeth removal in Nov 2022 for infection, planning to have replacement work done at dentist within next 3 months  Immunizations: no flu shot but will get it next week at outside source, covid x2 shots, uptodate childhood vaccinations  Social: former smoker 10 pack years, quit 7 years ago, social drinker, no drugs    Other: endoscopy + colonoscopy scheduled 2/1/23  Follows with rheumatology for back pain      Review of Systems   Constitutional: Positive for chills  Negative for fever  HENT: Positive for congestion and sore throat  Negative for ear pain, facial swelling, postnasal drip, rhinorrhea, sinus pressure, sinus pain and sneezing  Eyes: Negative for pain and visual disturbance  Respiratory: Negative for cough and shortness of breath  Cardiovascular: Negative for chest pain and palpitations  Gastrointestinal: Negative for abdominal pain, constipation, diarrhea, nausea and vomiting  Genitourinary: Negative for dysuria and hematuria  Musculoskeletal: Negative for arthralgias and back pain  Skin: Negative for color change and rash  Neurological: Positive for headaches (forehead)  Negative for dizziness, seizures, syncope, weakness and light-headedness  Psychiatric/Behavioral: Negative for confusion  All other systems reviewed and are negative        Current Outpatient Medications on File Prior to Visit   Medication Sig   • buprenorphine (SUBUTEX) 8 mg 8 mg 2 (two) times a day   • busPIRone (BUSPAR) 10 mg tablet Take 10 mg by mouth 3 (three) times a day as needed   • meloxicam (Mobic) 15 mg tablet Take 1 tablet (15 mg total) by mouth daily   • naloxone (NARCAN) 4 mg/0 1 mL nasal spray INSTILL 1 SPRAY INTO EACH NOSTRIL FOR OVERDOSE as directed   • Flowflex COVID-19 Ag Home Test KIT  (Patient not taking: Reported on 1/25/2023)   • polyethylene glycol (GOLYTELY) 4000 mL solution Take 4,000 mL by mouth once for 1 dose (Patient not taking: Reported on 1/25/2023)       Objective     /74 (BP Location: Left arm, Patient Position: Sitting)   Pulse 85   Temp 97 7 °F (36 5 °C) (Tympanic)   Ht 5' 8" (1 727 m)   Wt 94 8 kg (209 lb)   SpO2 98%   BMI 31 78 kg/m²     Physical Exam  Constitutional:       General: He is not in acute distress  Appearance: Normal appearance  He is well-developed  He is not ill-appearing  HENT:      Head: Normocephalic and atraumatic  Right Ear: No drainage, swelling or tenderness  A middle ear effusion is present  Tympanic membrane is not erythematous  Left Ear: Tympanic membrane normal  No drainage, swelling or tenderness  No middle ear effusion  Tympanic membrane is not erythematous  Ears:      Comments: B/l mild erythema in canals, patient uses q-tips by admission     Nose: No rhinorrhea  Comments: Left nare with mucus, right clear     Mouth/Throat:      Mouth: Mucous membranes are moist  No oral lesions  Pharynx: Uvula midline  Oropharyngeal exudate (slight streak of mucus indicative of postnasal drip noted briefly on the posterior oropharynx) and posterior oropharyngeal erythema (posterior oropharynx) present  No pharyngeal swelling or uvula swelling  Tonsils: No tonsillar exudate or tonsillar abscesses  Comments: No uvular swelling or tonsillar adenopathy  Eyes:      General:         Right eye: No discharge  Left eye: No discharge  Conjunctiva/sclera: Conjunctivae normal    Cardiovascular:      Rate and Rhythm: Normal rate and regular rhythm        Heart sounds: Normal heart sounds  No murmur heard  No friction rub  No gallop  Pulmonary:      Effort: Pulmonary effort is normal  No respiratory distress  Breath sounds: Normal breath sounds  No wheezing, rhonchi or rales  Abdominal:      General: Bowel sounds are normal  There is no distension  Palpations: Abdomen is soft  There is no mass  Tenderness: There is no abdominal tenderness  There is no guarding or rebound  Musculoskeletal:      Cervical back: Neck supple  Lymphadenopathy:      Cervical: No cervical adenopathy  Skin:     General: Skin is warm and dry  Coloration: Skin is not pale  Findings: No rash  Neurological:      Mental Status: He is alert         Je Starr MD

## 2023-01-25 ENCOUNTER — OFFICE VISIT (OUTPATIENT)
Dept: FAMILY MEDICINE CLINIC | Facility: CLINIC | Age: 33
End: 2023-01-25

## 2023-01-25 VITALS
SYSTOLIC BLOOD PRESSURE: 126 MMHG | DIASTOLIC BLOOD PRESSURE: 74 MMHG | TEMPERATURE: 97.7 F | HEART RATE: 85 BPM | BODY MASS INDEX: 31.67 KG/M2 | WEIGHT: 209 LBS | OXYGEN SATURATION: 98 % | HEIGHT: 68 IN

## 2023-01-25 DIAGNOSIS — J01.90 ACUTE NON-RECURRENT SINUSITIS, UNSPECIFIED LOCATION: ICD-10-CM

## 2023-01-25 DIAGNOSIS — J45.20 MILD INTERMITTENT ASTHMA WITHOUT COMPLICATION: ICD-10-CM

## 2023-01-25 DIAGNOSIS — J02.9 PHARYNGITIS, UNSPECIFIED ETIOLOGY: Primary | ICD-10-CM

## 2023-01-25 DIAGNOSIS — H65.01 NON-RECURRENT ACUTE SEROUS OTITIS MEDIA OF RIGHT EAR: ICD-10-CM

## 2023-01-25 RX ORDER — AZITHROMYCIN 250 MG/1
TABLET, FILM COATED ORAL
Qty: 6 TABLET | Refills: 0 | Status: SHIPPED | OUTPATIENT
Start: 2023-01-25 | End: 2023-01-27

## 2023-01-25 RX ORDER — ALBUTEROL SULFATE 90 UG/1
2 AEROSOL, METERED RESPIRATORY (INHALATION) EVERY 6 HOURS PRN
Qty: 8 G | Refills: 0 | Status: SHIPPED | OUTPATIENT
Start: 2023-01-25

## 2023-01-27 ENCOUNTER — TELEPHONE (OUTPATIENT)
Dept: FAMILY MEDICINE CLINIC | Facility: CLINIC | Age: 33
End: 2023-01-27

## 2023-01-27 DIAGNOSIS — J02.9 PHARYNGITIS, UNSPECIFIED ETIOLOGY: Primary | ICD-10-CM

## 2023-01-27 RX ORDER — AZITHROMYCIN 250 MG/1
250 TABLET, FILM COATED ORAL EVERY 24 HOURS
Qty: 3 TABLET | Refills: 0 | Status: SHIPPED | OUTPATIENT
Start: 2023-01-27 | End: 2023-01-30

## 2023-01-27 NOTE — TELEPHONE ENCOUNTER
Patient called in stating he accidentally threw out the rest of his medication ( it was in a bag in his truck the medication is the  Azithromycin 250 mg tablet     Patient Took day 1 and day 2  (  had 3 pills left )        Please advise    Nivia Cushing Nivia Cushing Nivia Cushing

## 2023-02-01 ENCOUNTER — ANESTHESIA EVENT (OUTPATIENT)
Dept: GASTROENTEROLOGY | Facility: HOSPITAL | Age: 33
End: 2023-02-01

## 2023-02-01 ENCOUNTER — ANESTHESIA (OUTPATIENT)
Dept: GASTROENTEROLOGY | Facility: HOSPITAL | Age: 33
End: 2023-02-01

## 2023-02-01 ENCOUNTER — HOSPITAL ENCOUNTER (OUTPATIENT)
Dept: GASTROENTEROLOGY | Facility: HOSPITAL | Age: 33
Setting detail: OUTPATIENT SURGERY
Discharge: HOME | End: 2023-02-01
Attending: STUDENT IN AN ORGANIZED HEALTH CARE EDUCATION/TRAINING PROGRAM | Admitting: STUDENT IN AN ORGANIZED HEALTH CARE EDUCATION/TRAINING PROGRAM

## 2023-02-01 VITALS
TEMPERATURE: 97 F | HEIGHT: 68 IN | OXYGEN SATURATION: 97 % | RESPIRATION RATE: 18 BRPM | BODY MASS INDEX: 31.67 KG/M2 | SYSTOLIC BLOOD PRESSURE: 115 MMHG | HEART RATE: 55 BPM | DIASTOLIC BLOOD PRESSURE: 64 MMHG | WEIGHT: 209 LBS

## 2023-02-01 DIAGNOSIS — R10.84 GENERALIZED ABDOMINAL PAIN: ICD-10-CM

## 2023-02-01 DIAGNOSIS — R63.4 UNINTENTIONAL WEIGHT LOSS: ICD-10-CM

## 2023-02-01 DIAGNOSIS — Z12.11 SCREENING FOR COLON CANCER: ICD-10-CM

## 2023-02-01 RX ORDER — PROPOFOL 10 MG/ML
INJECTION, EMULSION INTRAVENOUS AS NEEDED
Status: DISCONTINUED | OUTPATIENT
Start: 2023-02-01 | End: 2023-02-01

## 2023-02-01 RX ORDER — LIDOCAINE HYDROCHLORIDE 20 MG/ML
INJECTION, SOLUTION EPIDURAL; INFILTRATION; INTRACAUDAL; PERINEURAL AS NEEDED
Status: DISCONTINUED | OUTPATIENT
Start: 2023-02-01 | End: 2023-02-01

## 2023-02-01 RX ORDER — SODIUM CHLORIDE, SODIUM LACTATE, POTASSIUM CHLORIDE, CALCIUM CHLORIDE 600; 310; 30; 20 MG/100ML; MG/100ML; MG/100ML; MG/100ML
100 INJECTION, SOLUTION INTRAVENOUS CONTINUOUS
Status: DISCONTINUED | OUTPATIENT
Start: 2023-02-01 | End: 2023-02-05 | Stop reason: HOSPADM

## 2023-02-01 RX ADMIN — PROPOFOL 30 MG: 10 INJECTION, EMULSION INTRAVENOUS at 09:47

## 2023-02-01 RX ADMIN — PROPOFOL 120 MG: 10 INJECTION, EMULSION INTRAVENOUS at 09:45

## 2023-02-01 RX ADMIN — PROPOFOL 30 MG: 10 INJECTION, EMULSION INTRAVENOUS at 10:11

## 2023-02-01 RX ADMIN — SODIUM CHLORIDE, SODIUM LACTATE, POTASSIUM CHLORIDE, AND CALCIUM CHLORIDE: .6; .31; .03; .02 INJECTION, SOLUTION INTRAVENOUS at 09:37

## 2023-02-01 RX ADMIN — LIDOCAINE HYDROCHLORIDE 100 MG: 20 INJECTION, SOLUTION EPIDURAL; INFILTRATION; INTRACAUDAL; PERINEURAL at 09:45

## 2023-02-01 RX ADMIN — PROPOFOL 50 MG: 10 INJECTION, EMULSION INTRAVENOUS at 10:00

## 2023-02-01 RX ADMIN — PROPOFOL 30 MG: 10 INJECTION, EMULSION INTRAVENOUS at 10:03

## 2023-02-01 RX ADMIN — PROPOFOL 50 MG: 10 INJECTION, EMULSION INTRAVENOUS at 09:49

## 2023-02-01 RX ADMIN — PROPOFOL 50 MG: 10 INJECTION, EMULSION INTRAVENOUS at 09:56

## 2023-02-01 RX ADMIN — PROPOFOL 20 MG: 10 INJECTION, EMULSION INTRAVENOUS at 10:06

## 2023-02-01 RX ADMIN — PROPOFOL 50 MG: 10 INJECTION, EMULSION INTRAVENOUS at 09:51

## 2023-02-01 RX ADMIN — PROPOFOL 30 MG: 10 INJECTION, EMULSION INTRAVENOUS at 09:54

## 2023-02-01 NOTE — H&P
History and Physical - [facility]  Latonya Max 34 y o  male MRN: [id number]                  HPI: Latonya Max is a 34y o  year old male who presents for weight loss and abodminal pain  REVIEW OF SYSTEMS: Per the HPI, and otherwise unremarkable  Historical Information   Past Medical History:   Diagnosis Date   • Asthma    • Depressed    • Substance abuse ([address] )      Past Surgical History:   Procedure Laterality Date   • APPENDECTOMY     • DENTAL SURGERY  03/25/2022     Social History   Social History     Substance and Sexual Activity   Alcohol Use Yes    Comment: monthly     Social History     Substance and Sexual Activity   Drug Use No     Social History     Tobacco Use   Smoking Status Former   • Packs/day: 1 00   • Years: 10 00   • Pack years: 10 00   • Types: Cigarettes   Smokeless Tobacco Never     Family History   Problem Relation Age of Onset   • No Known Problems Mother    • No Known Problems Father        Meds/Allergies       Current Outpatient Medications:   •  albuterol (Proventil HFA) 90 mcg/act inhaler  •  buprenorphine (SUBUTEX) 8 mg  •  meloxicam (Mobic) 15 mg tablet  •  busPIRone (BUSPAR) 10 mg tablet  •  Flowflex COVID-19 Ag Home Test KIT  •  naloxone (NARCAN) 4 mg/0 1 mL nasal spray  •  polyethylene glycol (GOLYTELY) 4000 mL solution    No Known Allergies    Objective     /95   Pulse 68   Temp (!) 97 °F (36 1 °C) (Temporal)   Resp 18   Ht 5' 8" (1 727 m)   Wt 94 8 kg (209 lb)   SpO2 98%   BMI 31 78 kg/m²       PHYSICAL EXAM    Gen: NAD  Head: NCAT  CV: RRR  CHEST: Clear  ABD: soft, NT/ND  EXT: no edema      ASSESSMENT/PLAN:  This is a 34y o  year old male here for EGD and colonoscopy, and he is stable and optimized for his procedure

## 2023-02-01 NOTE — ANESTHESIA PREPROCEDURE EVALUATION
Procedure:  COLONOSCOPY  EGD    Relevant Problems   /RENAL   (+) Nephrolithiasis      NEURO/PSYCH   (+) Social anxiety disorder      Other   (+) Class 1 obesity due to excess calories without serious comorbidity with body mass index (BMI) of 30 0 to 30 9 in adult      Mild intermittent asthma    Physical Exam    Airway    Mallampati score: II  TM Distance: >3 FB  Neck ROM: full     Dental       Cardiovascular      Pulmonary      Other Findings        Anesthesia Plan  ASA Score- 2     Anesthesia Type- IV sedation with anesthesia with ASA Monitors  Additional Monitors:   Airway Plan:           Plan Factors-Exercise tolerance (METS): >4 METS  Chart reviewed  Patient summary reviewed  Patient is not a current smoker  Obstructive sleep apnea risk education given perioperatively  Induction- intravenous  Postoperative Plan-     Informed Consent- Anesthetic plan and risks discussed with patient  I personally reviewed this patient with the CRNA  Discussed and agreed on the Anesthesia Plan with the CRNA  Ladarius Bowen

## 2023-02-01 NOTE — ANESTHESIA POSTPROCEDURE EVALUATION
Post-Op Assessment Note    CV Status:  Stable    Pain management: adequate     Mental Status:  Sleepy   Hydration Status:  Euvolemic   PONV Controlled:  Controlled   Airway Patency:  Patent   Two or more mitigation strategies used for obstructive sleep apnea   Post Op Vitals Reviewed: Yes      Staff: CRNA         No notable events documented      BP   109/56   Temp 98   Pulse 59   Resp 18   SpO2 97

## 2023-05-18 ENCOUNTER — OFFICE VISIT (OUTPATIENT)
Dept: FAMILY MEDICINE CLINIC | Facility: CLINIC | Age: 33
End: 2023-05-18

## 2023-05-18 VITALS
WEIGHT: 212.2 LBS | HEART RATE: 85 BPM | OXYGEN SATURATION: 99 % | SYSTOLIC BLOOD PRESSURE: 126 MMHG | TEMPERATURE: 97.7 F | DIASTOLIC BLOOD PRESSURE: 82 MMHG | HEIGHT: 68 IN | BODY MASS INDEX: 32.16 KG/M2

## 2023-05-18 DIAGNOSIS — G44.209 TENSION HEADACHE: Primary | ICD-10-CM

## 2023-05-18 PROBLEM — F11.20 OPIOID TYPE DEPENDENCE, EPISODIC (HCC): Status: ACTIVE | Noted: 2023-05-18

## 2023-05-18 PROBLEM — F11.20 OPIOID TYPE DEPENDENCE, EPISODIC (HCC): Status: RESOLVED | Noted: 2023-05-18 | Resolved: 2023-05-18

## 2023-05-18 RX ORDER — PREDNISONE 10 MG/1
TABLET ORAL
Qty: 18 TABLET | Refills: 0 | Status: SHIPPED | OUTPATIENT
Start: 2023-05-18 | End: 2023-05-27

## 2023-05-18 RX ORDER — METHOCARBAMOL 500 MG/1
500 TABLET, FILM COATED ORAL 4 TIMES DAILY
Qty: 30 TABLET | Refills: 0 | Status: SHIPPED | OUTPATIENT
Start: 2023-05-18

## 2023-05-18 RX ORDER — BUPRENORPHINE 2 MG/1
TABLET SUBLINGUAL
COMMUNITY
Start: 2023-05-18

## 2023-05-18 NOTE — PROGRESS NOTES
Name: Jazmin Lawrence      : 1990      MRN: [id number]  Encounter Provider: NAIMA Durham  Encounter Date: 2023   Encounter department: [facility] [address] [place] [address]  Tension headache  -     predniSONE 10 mg tablet; Take 3 tablets (30 mg total) by mouth daily for 3 days, THEN 2 tablets (20 mg total) daily for 3 days, THEN 1 tablet (10 mg total) daily for 3 days  -     methocarbamol (ROBAXIN) 500 mg tablet; Take 1 tablet (500 mg total) by mouth 4 (four) times a day         Subjective         patient presents with complaints of headache that has been ongoing for approximately 1 month  He states that it is a bilateral pressure pain on both sides  He does not have any other associated symptoms except a cough sometimes productive  Tried Excedrin, motrin, sudafed, aleve, and no improvement in his symptoms  Laying down does help a little bit  He states that he typically always has a headache but this is a little bit worse than normal   He does have significant neck tension  He has had neck pain ongoing for several years  His cough is better today  He did have a stomachache yesterday which has resolved  In the past he has had injections in his neck without relief  Likely  Tension headache  significant spasm noted  Recommend heat, massage and stretches  Medications as instructed  Also, add daily anti-histmaine     Review of Systems   Constitutional: Negative for activity change, appetite change, diaphoresis, fatigue, fever and unexpected weight change  HENT: Negative for congestion, mouth sores, rhinorrhea, sinus pain, trouble swallowing and voice change  Eyes: Negative for photophobia and visual disturbance  Respiratory: Positive for cough  Negative for apnea, chest tightness, shortness of breath and wheezing  Cardiovascular: Negative for chest pain, palpitations and leg swelling     Gastrointestinal: Negative for abdominal distention, abdominal "pain, blood in stool, constipation, diarrhea, nausea and vomiting  Endocrine: Negative for cold intolerance, heat intolerance, polydipsia, polyphagia and polyuria  Genitourinary: Negative for decreased urine volume, difficulty urinating, frequency and urgency  Musculoskeletal: Negative for arthralgias, myalgias, neck pain and neck stiffness  Skin: Negative for color change, rash and wound  Neurological: Positive for headaches  Negative for dizziness, weakness, light-headedness and numbness  Hematological: Negative for adenopathy  Does not bruise/bleed easily  Psychiatric/Behavioral: Negative for self-injury, sleep disturbance and suicidal ideas  The patient is not nervous/anxious  Current Outpatient Medications on File Prior to Visit   Medication Sig   • buprenorphine (SUBUTEX) 2 mg    • buprenorphine (SUBUTEX) 8 mg 8 mg 2 (two) times a day   • [DISCONTINUED] albuterol (Proventil HFA) 90 mcg/act inhaler Inhale 2 puffs every 6 (six) hours as needed for wheezing or shortness of breath (Patient not taking: Reported on 5/18/2023)   • [DISCONTINUED] busPIRone (BUSPAR) 10 mg tablet Take 10 mg by mouth 3 (three) times a day as needed (Patient not taking: Reported on 5/18/2023)   • [DISCONTINUED] meloxicam (Mobic) 15 mg tablet Take 1 tablet (15 mg total) by mouth daily (Patient not taking: Reported on 5/18/2023)   • [DISCONTINUED] naloxone (NARCAN) 4 mg/0 1 mL nasal spray INSTILL 1 SPRAY INTO EACH NOSTRIL FOR OVERDOSE as directed (Patient not taking: Reported on 5/18/2023)       Objective     /82 (BP Location: Left arm, Patient Position: Sitting)   Pulse 85   Temp 97 7 °F (36 5 °C) (Tympanic)   Ht 5' 8\" (1 727 m)   Wt 96 3 kg (212 lb 3 2 oz)   SpO2 99%   BMI 32 26 kg/m²     Physical Exam  Vitals and nursing note reviewed  Constitutional:       General: He is not in acute distress  Appearance: Normal appearance  He is not ill-appearing or toxic-appearing     HENT:      Head: " Normocephalic and atraumatic  Right Ear: Tympanic membrane, ear canal and external ear normal       Left Ear: Tympanic membrane, ear canal and external ear normal       Mouth/Throat:      Mouth: Mucous membranes are moist       Pharynx: Oropharynx is clear  No oropharyngeal exudate or posterior oropharyngeal erythema  Eyes:      Extraocular Movements: Extraocular movements intact  Conjunctiva/sclera: Conjunctivae normal       Pupils: Pupils are equal, round, and reactive to light  Neck:     Cardiovascular:      Rate and Rhythm: Normal rate and regular rhythm  Pulses: Normal pulses  Heart sounds: Normal heart sounds  No murmur heard  No friction rub  No gallop  Pulmonary:      Effort: Pulmonary effort is normal  No respiratory distress  Breath sounds: Normal breath sounds  No stridor  No wheezing  Abdominal:      General: Abdomen is flat  Bowel sounds are normal       Palpations: Abdomen is soft  Musculoskeletal:      Cervical back: Normal range of motion and neck supple  No rigidity  No muscular tenderness  Skin:     General: Skin is warm and dry  Capillary Refill: Capillary refill takes less than 2 seconds  Coloration: Skin is not jaundiced or pale  Findings: No bruising  Neurological:      General: No focal deficit present  Mental Status: He is alert and oriented to person, place, and time  Mental status is at baseline  Psychiatric:         Mood and Affect: Mood normal          Behavior: Behavior normal          Thought Content:  Thought content normal          Judgment: Judgment normal        NAIMA Nielson

## 2023-05-24 NOTE — TELEPHONE ENCOUNTER
Care Management Initial Consult    General Information  Assessment completed with: Patient, Care Team Member, [facility] nurse [phone]  Type of CM/SW Visit: Initial Assessment  Primary Care Provider verified and updated as needed: Yes (Brad Thurston CHELLE [phone])   Readmission within the last 30 days: no previous admission in last 30 days    Advance Care Planning:    ACP document on file in EPIC is not the most recent document; requested updated document from [facility]--received and sent to [email or web address]     Communication Assessment  Patient's communication style: spoken language (English or Bilingual)    Hearing Difficulty or Deaf: no   Wear Glasses or Blind: other (see comments) (Cheaters)    Cognitive  Cognitive/Neuro/Behavioral: WDL  Level of Consciousness: alert                   Living Environment:   People in home: facility resident  n/a  Current living Arrangements: assisted living ([address]   [facility] [address])  Name of Facility: [facility] [phone]   Able to return to prior arrangements: yes  Living Arrangement Comments: per [facility] staff pt is high functioning, no cognitive issues; he is a falls risk and at baseline ambulates independently with a walker; they provide med management, dressing, bathing, meals, laundry, housekeeping    Family/Social Support:  Care provided by: self  Provides care for: no one  Marital Status: Single  Description of Support System:    Supportive, involved    Current Resources:   Patient receiving home care services: No  Community Resources: Other (see comment) (facility manages meds via [facility] [phone], [place] so any new meds are delivered the next day around 1pm)  Equipment currently used at home: walker, standard  Supplies currently used at home:      Employment/Financial:  Employment Status: disabled     Employment/ Comments: on long term disability due to injury bilateral ankle/feet  Patient called regarding referral to make new patient appointment    He stated he's not making the appointment because it costs him $75 to see a specialist  1999  Financial Concerns: No concerns identified   Finance Comments: active BCBS/BCBS MED[place]ARE [place]  Does the patient's insurance plan have a 3 day qualifying hospital stay waiver?  No    Lifestyle & Psychosocial Needs:  Social Determinants of Health     Tobacco Use: Medium Risk (5/22/2023)    Patient History      Smoking Tobacco Use: Former      Smokeless Tobacco Use: Never      Passive Exposure: Not on file   Alcohol Use: Not on file   Financial Resource Strain: Low Risk  (2/20/2023)    Overall Financial Resource Strain (CARDIA)      Difficulty of Paying Living Expenses: Not very hard   Food Insecurity: No Food Insecurity (2/20/2023)    Hunger Vital Sign      Worried About Running Out of Food in the Last Year: Never true      Ran Out of Food in the Last Year: Never true   Transportation Needs: Not on file   Physical Activity: Not on file   Stress: Not on file   Social Connections: Not on file   Intimate Partner Violence: Not on file   Depression: Not at risk (6/15/2021)    PHQ-2      PHQ-2 Score: 2   Housing Stability: Low Risk  (2/20/2023)    Housing Stability Vital Sign      Unable to Pay for Housing in the Last Year: No      Number of Places Lived in the Last Year: 1      Unstable Housing in the Last Year: No     Functional Status:  Prior to admission patient needed assistance: up independently with a walker     Mental Health Status:  Mental Health Status: No Current Concerns       Chemical Dependency Status:  Chemical Dependency Status: No Current Concerns (5/23/23 on admit, alcohol level in the ER was 0.23)           Values/Beliefs:  Spiritual, Cultural Beliefs, Jewish Practices, Values that affect care: no          Values/Beliefs Comment: Yazidism    Additional Information:  CM team called (facility name) [facility], phone : [phone] and requested to speak with staff who normally care for pt.  Was provided with direct line for nursing office [place]  "[phone] and left message requesting background information on pt's baseline and place of living.      Awaiting callback for the following information:     1. In what kind of facility does pt reside (SNF, [facility], group home)? Assisted Living  2. What do you know about pt's baseline cognition and mobility? Pt is high functioning, no cognitive issues (scored 29/30 of SLUMS), he is considered a high falls risk due to baseline balance issues; he is up independently at baseline with his walker  What level of cognition/mobility is required for safe return? Will need to mobilize independently with walker; they do have outpatient PT/OT therapies \"right across the humphries from his apartment\" which pt could do at the [facility] 2x/wk   3. Is resident enrolled in any services?  yes, medication management and an in-house provider sees him weekly; other services include dressing, bathing, meals, housekeeping  4. Are there grab bars in pt's unit? Yes, shower grab bars  5. Does pt have any personal DME? Yes, has own walker  6. Best number to reach facility nursing? Phone [phone]  Fax:  [phone]  Evening/weekend number? No nursing on evening/weekends, call main desk   7. What does the facility need faxed to them from us at discharge?  Orders, MAR, H&P, therapy notes, and discharge summary  8. Does facility manage medications?   Yes  If yes, any contracted pharmacy? Name:  [facility] [phone]  If new Rx meds at discharge, fill at hospital pharmacy or contracted pharmacy?   If any med changes that need to be administered prior to 1pm the next day please have hospital pharmacy fill via blister pack    9. (If not present in Epic)--please fax copy of POLST, done, and GINNY-RN emailed updated document to [email or web address]    Explained we can provide therapy notes and information when it is available and provided callback contact information.       Met with patient in room, introduced self and role in discharge " "planning. Confirmed the information in the above assessment, please see each section for helpful details. Patient appreciates having the above baseline information charted and anticipates he will need TCU level of care prior to returning to [facility], \"I was unsteady before this fall and broken leg, I'm definitely going to need a rehab stay again--I've had them before ([place]), I've had home health ([facility]) too.\"  Pt/family was provided with the Medicare Compare list for SNF.  Discussed associated Medicare star ratings to assist with choice for referrals/discharge planning Yes; Education was given to pt/family that star ratings are updated/maintained by Medicare and can be reviewed by visiting www.medicare.gov Yes.     Patient states \"[facility] to my [facility], that would be wonderful!\"     Keesha Duarte RN, BSN, PHN  [facility]  Inpatient Care Management - FLOAT  Orth[facility] Spine CM RN Mobile: [phone] daily 7:30-4:00      "

## 2023-06-09 ENCOUNTER — OFFICE VISIT (OUTPATIENT)
Dept: FAMILY MEDICINE CLINIC | Facility: CLINIC | Age: 33
End: 2023-06-09
Payer: COMMERCIAL

## 2023-06-09 ENCOUNTER — NURSE TRIAGE (OUTPATIENT)
Dept: OTHER | Facility: OTHER | Age: 33
End: 2023-06-09

## 2023-06-09 VITALS
HEIGHT: 68 IN | OXYGEN SATURATION: 96 % | BODY MASS INDEX: 31.67 KG/M2 | DIASTOLIC BLOOD PRESSURE: 78 MMHG | TEMPERATURE: 98 F | WEIGHT: 209 LBS | SYSTOLIC BLOOD PRESSURE: 122 MMHG | HEART RATE: 84 BPM

## 2023-06-09 DIAGNOSIS — J02.9 PHARYNGITIS, UNSPECIFIED ETIOLOGY: Primary | ICD-10-CM

## 2023-06-09 LAB — S PYO AG THROAT QL: NEGATIVE

## 2023-06-09 PROCEDURE — 87070 CULTURE OTHR SPECIMN AEROBIC: CPT | Performed by: NURSE PRACTITIONER

## 2023-06-09 PROCEDURE — 87880 STREP A ASSAY W/OPTIC: CPT | Performed by: NURSE PRACTITIONER

## 2023-06-09 PROCEDURE — 99214 OFFICE O/P EST MOD 30 MIN: CPT | Performed by: NURSE PRACTITIONER

## 2023-06-09 RX ORDER — METHYLPREDNISOLONE 4 MG/1
TABLET ORAL
Qty: 21 EACH | Refills: 0 | Status: SHIPPED | OUTPATIENT
Start: 2023-06-09

## 2023-06-09 NOTE — PROGRESS NOTES
Name: Maxime Alvarez      : 1990      MRN: [id number]  Encounter Provider: NAIMA Bergman  Encounter Date: 2023   Encounter department: [facility] [address] [place] [address]  Pharyngitis, unspecified etiology  -     POCT rapid strepA  -     Throat culture; Future  -     methylPREDNISolone 4 MG tablet therapy pack; Use as directed on package           Subjective      Patient presents with complaints of sore throat and hoarse voice that started approximately 2 days ago  Does have a very intermittent cough but it is usually only when he feels like he has phlegm that is clear from his throat  Does have mild rhinorrhea  Has not tried anything for symptoms  No sick exposures  Negative rapid strep  Medrol given, warm salt water gargles, hot tea with honey, otc decongestatns prn  Sore Throat   This is a new problem  The current episode started yesterday  The problem has been unchanged  Neither side of throat is experiencing more pain than the other  There has been no fever  The pain is moderate  Associated symptoms include a hoarse voice  Pertinent negatives include no abdominal pain, congestion, coughing, diarrhea, drooling, ear discharge, ear pain, headaches, plugged ear sensation, neck pain, shortness of breath, stridor, swollen glands, trouble swallowing or vomiting  He has had no exposure to strep or mono  He has tried nothing for the symptoms  Review of Systems   Constitutional: Negative for activity change, appetite change, fatigue, fever and unexpected weight change  HENT: Positive for hoarse voice and sore throat  Negative for congestion, drooling, ear discharge, ear pain, postnasal drip, rhinorrhea, sinus pressure, sinus pain, sneezing and trouble swallowing  Respiratory: Negative for cough, shortness of breath and stridor  Gastrointestinal: Negative for abdominal pain, constipation, diarrhea, nausea and vomiting     Musculoskeletal: Negative "for neck pain  Neurological: Negative for headaches  Current Outpatient Medications on File Prior to Visit   Medication Sig   • buprenorphine (SUBUTEX) 2 mg    • buprenorphine (SUBUTEX) 8 mg 8 mg 2 (two) times a day   • methocarbamol (ROBAXIN) 500 mg tablet Take 1 tablet (500 mg total) by mouth 4 (four) times a day       Objective     /78   Pulse 84   Temp 98 °F (36 7 °C)   Ht 5' 8\" (1 727 m)   Wt 94 8 kg (209 lb)   SpO2 96%   BMI 31 78 kg/m²     Physical Exam  Vitals and nursing note reviewed  Constitutional:       General: He is not in acute distress  Appearance: Normal appearance  He is normal weight  He is not ill-appearing or toxic-appearing  HENT:      Right Ear: Tympanic membrane and ear canal normal  No drainage, swelling or tenderness  No middle ear effusion  Left Ear: Tympanic membrane and ear canal normal  No drainage, swelling or tenderness  No middle ear effusion  Tympanic membrane is not erythematous  Mouth/Throat:      Mouth: No oral lesions  Pharynx: Oropharyngeal exudate and posterior oropharyngeal erythema present  No pharyngeal swelling  Cardiovascular:      Rate and Rhythm: Normal rate and regular rhythm  Pulses: Normal pulses  Heart sounds: Normal heart sounds  No murmur heard  No friction rub  No gallop  Pulmonary:      Effort: Pulmonary effort is normal       Breath sounds: Normal breath sounds  No wheezing, rhonchi or rales  Abdominal:      General: Abdomen is flat  Bowel sounds are normal       Palpations: Abdomen is soft  Musculoskeletal:         General: Normal range of motion  Right lower leg: No edema  Left lower leg: No edema  Skin:     General: Skin is warm  Capillary Refill: Capillary refill takes less than 2 seconds  Findings: No bruising or lesion  Neurological:      General: No focal deficit present  Mental Status: He is alert and oriented to person, place, and time   Mental status is at " baseline  Motor: No weakness  Gait: Gait normal    Psychiatric:         Mood and Affect: Mood normal          Behavior: Behavior normal          Thought Content:  Thought content normal          Judgment: Judgment normal        NAIMA Bergman

## 2023-06-09 NOTE — TELEPHONE ENCOUNTER
"  Reason for Disposition  • Sore throat is main symptom and persists > 48 hours    Answer Assessment - Initial Assessment Questions  1  ONSET: \"When did the throat start hurting? \" (Hours or days ago)       Sore throat started 2-3 days ago    2  SEVERITY: \"How bad is the sore throat? \" (Scale 1-10; mild, moderate or severe)    - MILD (1-3):  doesn't interfere with eating or normal activities    - MODERATE (4-7): interferes with eating some solids and normal activities    - SEVERE (8-10):  excruciating pain, interferes with most normal activities    - SEVERE DYSPHAGIA: can't swallow liquids, drooling      6-7/10    3  STREP EXPOSURE: \"Has there been any exposure to strep within the past week? \" If Yes, ask: \"What type of contact occurred? \"       Denies    4  VIRAL SYMPTOMS: [place] there any symptoms of a cold, such as a runny nose, cough, hoarse voice or red eyes? \"       Hoarse voice, cough    5  FEVER: \"Do you have a fever? \" If Yes, ask: \"What is your temperature, how was it measured, and when did it start? \"      Denies    6  PUS ON THE TONSILS: \"Is there pus on the tonsils in the back of your throat? \"      White lines in the back of my throat    7  OTHER SYMPTOMS: \"Do you have any other symptoms? \" (e g , difficulty breathing, headache, rash)      Denies    Protocols used: SORE THROAT-ADULT-OH    "

## 2023-06-09 NOTE — TELEPHONE ENCOUNTER
"Regarding: No voice/ sore throat  ----- Message from Thomas Lopez sent at 6/9/2023  7:25 AM EDT -----  \"I think I have strep throat  I have no voice and it really hurts  \"    "

## 2023-06-11 LAB — BACTERIA THROAT CULT: NORMAL

## 2023-06-12 DIAGNOSIS — J02.9 PHARYNGITIS, UNSPECIFIED ETIOLOGY: Primary | ICD-10-CM

## 2023-06-12 RX ORDER — AZITHROMYCIN 250 MG/1
TABLET, FILM COATED ORAL
Qty: 6 TABLET | Refills: 0 | Status: SHIPPED | OUTPATIENT
Start: 2023-06-12 | End: 2023-06-17

## 2023-06-30 ENCOUNTER — OFFICE VISIT (OUTPATIENT)
Dept: FAMILY MEDICINE CLINIC | Facility: CLINIC | Age: 33
End: 2023-06-30
Payer: COMMERCIAL

## 2023-06-30 VITALS
OXYGEN SATURATION: 97 % | HEART RATE: 76 BPM | SYSTOLIC BLOOD PRESSURE: 118 MMHG | TEMPERATURE: 98 F | WEIGHT: 212.6 LBS | HEIGHT: 68 IN | BODY MASS INDEX: 32.22 KG/M2 | DIASTOLIC BLOOD PRESSURE: 84 MMHG

## 2023-06-30 DIAGNOSIS — J32.9 SINUSITIS, UNSPECIFIED CHRONICITY, UNSPECIFIED LOCATION: ICD-10-CM

## 2023-06-30 DIAGNOSIS — Z13.220 SCREENING FOR CHOLESTEROL LEVEL: ICD-10-CM

## 2023-06-30 DIAGNOSIS — Z23 ENCOUNTER FOR IMMUNIZATION: ICD-10-CM

## 2023-06-30 DIAGNOSIS — Z00.00 ANNUAL PHYSICAL EXAM: Primary | ICD-10-CM

## 2023-06-30 DIAGNOSIS — R30.0 BURNING WITH URINATION: ICD-10-CM

## 2023-06-30 DIAGNOSIS — Z13.29 SCREENING FOR THYROID DISORDER: ICD-10-CM

## 2023-06-30 DIAGNOSIS — Z13.0 SCREENING FOR DEFICIENCY ANEMIA: ICD-10-CM

## 2023-06-30 LAB
BACTERIA UR QL AUTO: ABNORMAL /HPF
BILIRUB UR QL STRIP: NEGATIVE
CLARITY UR: CLEAR
COLOR UR: YELLOW
GLUCOSE UR STRIP-MCNC: NEGATIVE MG/DL
HGB UR QL STRIP.AUTO: NEGATIVE
KETONES UR STRIP-MCNC: NEGATIVE MG/DL
LEUKOCYTE ESTERASE UR QL STRIP: NEGATIVE
MUCOUS THREADS UR QL AUTO: ABNORMAL
NITRITE UR QL STRIP: NEGATIVE
NON-SQ EPI CELLS URNS QL MICRO: ABNORMAL /HPF
PH UR STRIP.AUTO: 6 [PH]
PROT UR STRIP-MCNC: ABNORMAL MG/DL
RBC #/AREA URNS AUTO: ABNORMAL /HPF
SP GR UR STRIP.AUTO: 1.02 (ref 1–1.03)
UROBILINOGEN UR STRIP-ACNC: <2 MG/DL
WBC #/AREA URNS AUTO: ABNORMAL /HPF

## 2023-06-30 PROCEDURE — 87591 N.GONORRHOEAE DNA AMP PROB: CPT | Performed by: NURSE PRACTITIONER

## 2023-06-30 PROCEDURE — 87491 CHLMYD TRACH DNA AMP PROBE: CPT | Performed by: NURSE PRACTITIONER

## 2023-06-30 PROCEDURE — 81001 URINALYSIS AUTO W/SCOPE: CPT | Performed by: NURSE PRACTITIONER

## 2023-06-30 RX ORDER — ALBUTEROL SULFATE 90 UG/1
2 AEROSOL, METERED RESPIRATORY (INHALATION) EVERY 6 HOURS PRN
COMMUNITY

## 2023-06-30 RX ORDER — AZITHROMYCIN 250 MG/1
TABLET, FILM COATED ORAL
Qty: 6 TABLET | Refills: 0 | Status: SHIPPED | OUTPATIENT
Start: 2023-06-30 | End: 2023-07-05

## 2023-06-30 NOTE — PROGRESS NOTES
[facility]    NAME: Travis Torres  AGE: 28 y o  SEX: male  : 1990     DATE: 2023     Assessment and Plan:     Problem List Items Addressed This Visit    None  Visit Diagnoses     Annual physical exam    -  Primary    Encounter for immunization        Relevant Orders    Pneumococcal Conjugate Vaccine 20-valent (Pcv20)    Screening for cholesterol level        Relevant Orders    Comprehensive metabolic panel    Lipid panel    Screening for thyroid disorder        Relevant Orders    TSH, 3rd generation with Free T4 reflex    Screening for deficiency anemia        Relevant Orders    CBC and differential    Burning with urination        Relevant Orders    RPR-Syphilis Screening (Total Syphilis IGG/IGM)    HSV 1/2 IgM and Type Specific IgG    : HIV 1/2 AB/AG w Reflex [facility] for 2 yr old and above    UA w Reflex to Microscopic w Reflex to Culture - Clinic Collect    Chlamydia/GC amplified DNA by PCR    Sinusitis, unspecified chronicity, unspecified location        Relevant Medications    azithromycin (Zithromax) 250 mg tablet          Immunizations and preventive care screenings were discussed with patient today  Appropriate education was printed on patient's after visit summary  Counseling:  Dental Health: discussed importance of regular tooth brushing, flossing, and dental visits  Exercise: the importance of regular exercise/physical activity was discussed  Recommend exercise 3-5 times per week for at least 30 minutes  BMI Counseling: Body mass index is 32 33 kg/m²   The BMI is above normal  Nutrition recommendations include decreasing portion sizes, encouraging healthy choices of fruits and vegetables, decreasing fast food intake, consuming healthier snacks, limiting drinks that contain sugar, moderation in carbohydrate intake, increasing intake of lean protein, reducing intake of saturated and trans fat and reducing intake of cholesterol  Exercise recommendations include moderate physical activity 150 minutes/week  Rationale for BMI follow-up plan is due to patient being overweight or obese  Depression Screening and Follow-up Plan: Patient was screened for depression during today's encounter  They screened negative with a PHQ-2 score of 0  Return in about 1 year (around 6/30/2024) for Annual physical      Chief Complaint:     Chief Complaint   Patient presents with   • Physical Exam     Annual  Pressure under eyes      History of Present Illness:     Adult Annual Physical   Patient here for a comprehensive physical exam  The patient reports problems - concerns for burning with urination for a few weeks  No other associated symptoms  Requesting SIT testing- no other symptoms  Ordered  also, sees urology       Sinus pressure- ongoing for one week and worsening  Tired OTC allergy medication which he started two weeks ago and very mild improvement  No other associated symptoms  z-pack given  S/s of when to return reviewed  Diet and Physical Activity  Diet/Nutrition: poor diet and recently made changes   Exercise: strength training exercises  Depression Screening  PHQ-2/9 Depression Screening    Little interest or pleasure in doing things: 0 - not at all  Feeling down, depressed, or hopeless: 0 - not at all  PHQ-2 Score: 0  PHQ-2 Interpretation: Negative depression screen       General Health  Sleep: sleeps well and gets 4-6 hours of sleep on average  Hearing: normal - bilateral   Vision: no vision problems, wears glasses and wears contacts  Dental: no dental visits for >1 year, brushes teeth twice daily and flosses teeth occasionally   Health  History of STDs?: no      Review of Systems:     Review of Systems   Constitutional: Negative for activity change, appetite change, diaphoresis, fatigue, fever and unexpected weight change  HENT: Positive for sinus pressure and sinus pain   Negative for congestion, mouth sores, rhinorrhea, trouble swallowing and voice change  Eyes: Negative for photophobia and visual disturbance  Respiratory: Negative for apnea, cough, chest tightness, shortness of breath and wheezing  Cardiovascular: Negative for chest pain, palpitations and leg swelling  Gastrointestinal: Negative for abdominal distention, abdominal pain, blood in stool, constipation, diarrhea, nausea and vomiting  Endocrine: Negative for cold intolerance, heat intolerance, polydipsia, polyphagia and polyuria  Genitourinary: Negative for decreased urine volume, difficulty urinating, frequency and urgency  Burning with urination    Musculoskeletal: Negative for arthralgias, myalgias, neck pain and neck stiffness  Skin: Negative for color change, rash and wound  Neurological: Negative for dizziness, weakness, light-headedness, numbness and headaches  Hematological: Negative for adenopathy  Does not bruise/bleed easily  Psychiatric/Behavioral: Negative for self-injury, sleep disturbance and suicidal ideas  The patient is not nervous/anxious         Past Medical History:     Past Medical History:   Diagnosis Date   • Asthma    • Depressed    • [facility]       Past Surgical History:     Past Surgical History:   Procedure Laterality Date   • APPENDECTOMY     • DENTAL SURGERY  03/25/2022      Social History:     Social History     Socioeconomic History   • Marital status: Single     Spouse name: None   • Number of children: None   • Years of education: None   • Highest education level: None   Occupational History   • None   Tobacco Use   • Smoking status: Former     Packs/day: 1 00     Years: 10 00     Total pack years: 10 00     Types: Cigarettes   • Smokeless tobacco: Never   Vaping Use   • Vaping Use: Never used   Substance and Sexual Activity   • Alcohol use: Yes     Comment: monthly   • Drug use: No   • Sexual activity: Yes     Partners: Female   Other Topics Concern   • "None   Social History Narrative   • None     Social Determinants of Health     Financial Resource Strain: Not on file   Food Insecurity: Not on file   Transportation Needs: Not on file   Physical Activity: Not on file   Stress: Not on file   Social Connections: Not on file   Intimate Partner Violence: Not on file   Housing Stability: Not on file      Family History:     Family History   Problem Relation Age of Onset   • No Known Problems Mother    • No Known Problems Father       Current Medications:     Current Outpatient Medications   Medication Sig Dispense Refill   • albuterol (PROVENTIL HFA,VENTOLIN HFA) 90 mcg/act inhaler Inhale 2 puffs every 6 (six) hours as needed     • azithromycin (Zithromax) 250 mg tablet Take 2 tablets (500 mg total) by mouth daily for 1 day, THEN 1 tablet (250 mg total) daily for 4 days  6 tablet 0   • buprenorphine (SUBUTEX) 2 mg 2 mg daily     • buprenorphine (SUBUTEX) 8 mg 8 mg 2 (two) times a day       No current facility-administered medications for this visit  Allergies:     No Known Allergies   Physical Exam:     /84 (BP Location: Left arm, Patient Position: Sitting)   Pulse 76   Temp 98 °F (36 7 °C) (Tympanic)   Ht 5' 8\" (1 727 m)   Wt 96 4 kg (212 lb 9 6 oz)   SpO2 97%   BMI 32 33 kg/m²     Physical Exam  Constitutional:       General: He is not in acute distress  Appearance: Normal appearance  He is not ill-appearing or toxic-appearing  HENT:      Head: Normocephalic and atraumatic  Right Ear: Ear canal and external ear normal  A middle ear effusion is present  There is no impacted cerumen  Tympanic membrane is not perforated, erythematous or retracted  Left Ear: Ear canal and external ear normal  A middle ear effusion is present  There is no impacted cerumen  Tympanic membrane is not perforated, erythematous or retracted  Nose: Congestion present  No rhinorrhea  Right Turbinates: Swollen  Left Turbinates: Swollen        " Mouth/Throat:      Mouth: Mucous membranes are moist       Pharynx: Oropharynx is clear  No oropharyngeal exudate or posterior oropharyngeal erythema  Eyes:      General: No scleral icterus  Right eye: No discharge  Left eye: No discharge  Conjunctiva/sclera: Conjunctivae normal       Pupils: Pupils are equal, round, and reactive to light  Cardiovascular:      Rate and Rhythm: Normal rate and regular rhythm  Pulses: Normal pulses  Heart sounds: Normal heart sounds  No murmur heard  No friction rub  No gallop  Pulmonary:      Effort: Pulmonary effort is normal  No respiratory distress  Breath sounds: Normal breath sounds  No stridor  No wheezing, rhonchi or rales  Abdominal:      General: Abdomen is flat  Bowel sounds are normal  There is no distension  Palpations: Abdomen is soft  There is no mass  Tenderness: There is no abdominal tenderness  Musculoskeletal:         General: No swelling, tenderness, deformity or signs of injury  Normal range of motion  Cervical back: Normal range of motion and neck supple  No rigidity  No muscular tenderness  Lymphadenopathy:      Cervical: No cervical adenopathy  Skin:     General: Skin is warm and dry  Capillary Refill: Capillary refill takes less than 2 seconds  Coloration: Skin is not jaundiced or pale  Findings: No bruising or lesion  Neurological:      General: No focal deficit present  Mental Status: He is alert and oriented to person, place, and time  Mental status is at baseline  Sensory: No sensory deficit  Motor: No weakness  Gait: Gait normal    Psychiatric:         Mood and Affect: Mood normal          Behavior: Behavior normal          Thought Content:  Thought content normal          Judgment: Judgment normal           [facility], [address]

## 2023-07-03 ENCOUNTER — APPOINTMENT (OUTPATIENT)
Dept: LAB | Facility: CLINIC | Age: 33
End: 2023-07-03
Payer: COMMERCIAL

## 2023-07-03 DIAGNOSIS — M25.50 ARTHRALGIA, UNSPECIFIED JOINT: ICD-10-CM

## 2023-07-03 DIAGNOSIS — R30.0 BURNING WITH URINATION: ICD-10-CM

## 2023-07-03 DIAGNOSIS — M47.819 SPONDYLOARTHRITIS: ICD-10-CM

## 2023-07-03 DIAGNOSIS — E29.1 HYPOGONADISM IN MALE: ICD-10-CM

## 2023-07-03 DIAGNOSIS — Z13.220 SCREENING FOR CHOLESTEROL LEVEL: ICD-10-CM

## 2023-07-03 DIAGNOSIS — Z13.29 SCREENING FOR THYROID DISORDER: ICD-10-CM

## 2023-07-03 DIAGNOSIS — Z13.0 SCREENING FOR DEFICIENCY ANEMIA: ICD-10-CM

## 2023-07-03 DIAGNOSIS — N20.0 NEPHROLITHIASIS: ICD-10-CM

## 2023-07-03 LAB
C TRACH DNA SPEC QL NAA+PROBE: NEGATIVE
N GONORRHOEA DNA SPEC QL NAA+PROBE: NEGATIVE

## 2023-07-03 PROCEDURE — 86140 C-REACTIVE PROTEIN: CPT

## 2023-07-03 PROCEDURE — 80053 COMPREHEN METABOLIC PANEL: CPT

## 2023-07-03 PROCEDURE — 85025 COMPLETE CBC W/AUTO DIFF WBC: CPT

## 2023-07-03 PROCEDURE — 83002 ASSAY OF GONADOTROPIN (LH): CPT

## 2023-07-03 PROCEDURE — 81374 HLA I TYPING 1 ANTIGEN LR: CPT

## 2023-07-03 PROCEDURE — 84153 ASSAY OF PSA TOTAL: CPT

## 2023-07-03 PROCEDURE — 82306 VITAMIN D 25 HYDROXY: CPT

## 2023-07-03 PROCEDURE — 84146 ASSAY OF PROLACTIN: CPT

## 2023-07-03 PROCEDURE — 87389 HIV-1 AG W/HIV-1&-2 AB AG IA: CPT

## 2023-07-03 PROCEDURE — 84443 ASSAY THYROID STIM HORMONE: CPT

## 2023-07-03 PROCEDURE — 36415 COLL VENOUS BLD VENIPUNCTURE: CPT

## 2023-07-03 PROCEDURE — 86695 HERPES SIMPLEX TYPE 1 TEST: CPT

## 2023-07-03 PROCEDURE — 83001 ASSAY OF GONADOTROPIN (FSH): CPT

## 2023-07-03 PROCEDURE — 84402 ASSAY OF FREE TESTOSTERONE: CPT

## 2023-07-03 PROCEDURE — 86803 HEPATITIS C AB TEST: CPT

## 2023-07-03 PROCEDURE — 87340 HEPATITIS B SURFACE AG IA: CPT

## 2023-07-03 PROCEDURE — 85652 RBC SED RATE AUTOMATED: CPT

## 2023-07-03 PROCEDURE — 80061 LIPID PANEL: CPT

## 2023-07-03 PROCEDURE — 86696 HERPES SIMPLEX TYPE 2 TEST: CPT

## 2023-07-03 PROCEDURE — 82607 VITAMIN B-12: CPT

## 2023-07-03 PROCEDURE — 84403 ASSAY OF TOTAL TESTOSTERONE: CPT

## 2023-07-03 PROCEDURE — 86480 TB TEST CELL IMMUN MEASURE: CPT

## 2023-07-03 PROCEDURE — 86780 TREPONEMA PALLIDUM: CPT

## 2023-07-04 LAB
25(OH)D3 SERPL-MCNC: 29.9 NG/ML (ref 30–100)
ALBUMIN SERPL BCP-MCNC: 4.4 G/DL (ref 3.5–5)
ALP SERPL-CCNC: 95 U/L (ref 46–116)
ALT SERPL W P-5'-P-CCNC: 45 U/L (ref 12–78)
ANION GAP SERPL CALCULATED.3IONS-SCNC: 6 MMOL/L
AST SERPL W P-5'-P-CCNC: 24 U/L (ref 5–45)
BASOPHILS # BLD AUTO: 0.03 THOUSANDS/ÂΜL (ref 0–0.1)
BASOPHILS NFR BLD AUTO: 1 % (ref 0–1)
BILIRUB SERPL-MCNC: 0.39 MG/DL (ref 0.2–1)
BUN SERPL-MCNC: 19 MG/DL (ref 5–25)
CALCIUM SERPL-MCNC: 9.3 MG/DL (ref 8.3–10.1)
CHLORIDE SERPL-SCNC: 110 MMOL/L (ref 96–108)
CHOLEST SERPL-MCNC: 159 MG/DL
CO2 SERPL-SCNC: 26 MMOL/L (ref 21–32)
CREAT SERPL-MCNC: 0.97 MG/DL (ref 0.6–1.3)
CRP SERPL QL: <3 MG/L
EOSINOPHIL # BLD AUTO: 0.15 THOUSAND/ÂΜL (ref 0–0.61)
EOSINOPHIL NFR BLD AUTO: 2 % (ref 0–6)
ERYTHROCYTE [DISTWIDTH] IN BLOOD BY AUTOMATED COUNT: 12.3 % (ref 11.6–15.1)
ERYTHROCYTE [SEDIMENTATION RATE] IN BLOOD: 5 MM/HOUR (ref 0–14)
FSH SERPL-ACNC: 2.7 MIU/ML
GFR SERPL CREATININE-BSD FRML MDRD: 101 ML/MIN/1.73SQ M
GLUCOSE P FAST SERPL-MCNC: 108 MG/DL (ref 65–99)
HBV SURFACE AG SER QL: NORMAL
HCT VFR BLD AUTO: 42.8 % (ref 36.5–49.3)
HCV AB SER QL: NORMAL
HDLC SERPL-MCNC: 37 MG/DL
HGB BLD-MCNC: 14.7 G/DL (ref 12–17)
HIV 1+2 AB+HIV1 P24 AG SERPL QL IA: NORMAL
HIV 2 AB SERPL QL IA: NORMAL
HIV1 AB SERPL QL IA: NORMAL
HIV1 P24 AG SERPL QL IA: NORMAL
IMM GRANULOCYTES # BLD AUTO: 0.02 THOUSAND/UL (ref 0–0.2)
IMM GRANULOCYTES NFR BLD AUTO: 0 % (ref 0–2)
LDLC SERPL CALC-MCNC: 43 MG/DL (ref 0–100)
LH SERPL-ACNC: 3.5 MIU/ML
LYMPHOCYTES # BLD AUTO: 3.28 THOUSANDS/ÂΜL (ref 0.6–4.47)
LYMPHOCYTES NFR BLD AUTO: 52 % (ref 14–44)
MCH RBC QN AUTO: 28.5 PG (ref 26.8–34.3)
MCHC RBC AUTO-ENTMCNC: 34.3 G/DL (ref 31.4–37.4)
MCV RBC AUTO: 83 FL (ref 82–98)
MONOCYTES # BLD AUTO: 0.27 THOUSAND/ÂΜL (ref 0.17–1.22)
MONOCYTES NFR BLD AUTO: 4 % (ref 4–12)
NEUTROPHILS # BLD AUTO: 2.57 THOUSANDS/ÂΜL (ref 1.85–7.62)
NEUTS SEG NFR BLD AUTO: 41 % (ref 43–75)
NONHDLC SERPL-MCNC: 122 MG/DL
NRBC BLD AUTO-RTO: 0 /100 WBCS
PLATELET # BLD AUTO: 229 THOUSANDS/UL (ref 149–390)
PMV BLD AUTO: 10.9 FL (ref 8.9–12.7)
POTASSIUM SERPL-SCNC: 3.9 MMOL/L (ref 3.5–5.3)
PROLACTIN SERPL-MCNC: 11.82 NG/ML
PROT SERPL-MCNC: 6.9 G/DL (ref 6.4–8.4)
PSA SERPL-MCNC: 1.14 NG/ML (ref 0–4)
RBC # BLD AUTO: 5.15 MILLION/UL (ref 3.88–5.62)
SODIUM SERPL-SCNC: 142 MMOL/L (ref 135–147)
TREPONEMA PALLIDUM IGG+IGM AB [PRESENCE] IN SERUM OR PLASMA BY IMMUNOASSAY: NORMAL
TRIGL SERPL-MCNC: 395 MG/DL
TSH SERPL DL<=0.05 MIU/L-ACNC: 2.05 UIU/ML (ref 0.45–4.5)
VIT B12 SERPL-MCNC: 592 PG/ML (ref 180–914)
WBC # BLD AUTO: 6.32 THOUSAND/UL (ref 4.31–10.16)

## 2023-07-05 LAB
TESTOST FREE SERPL-MCNC: 3.8 PG/ML (ref 8.7–25.1)
TESTOST SERPL-MCNC: 299 NG/DL (ref 264–916)

## 2023-07-06 DIAGNOSIS — E29.1 HYPOGONADISM IN MALE: Primary | ICD-10-CM

## 2023-07-06 LAB
GAMMA INTERFERON BACKGROUND BLD IA-ACNC: 0.05 IU/ML
HSV1 IGG SER IA-ACNC: 47.1 INDEX (ref 0–0.9)
HSV2 IGG SER IA-ACNC: <0.91 INDEX (ref 0–0.9)
M TB IFN-G BLD-IMP: NEGATIVE
M TB IFN-G CD4+ BCKGRND COR BLD-ACNC: -0.01 IU/ML
M TB IFN-G CD4+ BCKGRND COR BLD-ACNC: -0.02 IU/ML
MITOGEN IGNF BCKGRD COR BLD-ACNC: >10 IU/ML

## 2023-07-10 DIAGNOSIS — E78.1 HYPERTRIGLYCERIDEMIA: Primary | ICD-10-CM

## 2023-07-11 ENCOUNTER — TELEPHONE (OUTPATIENT)
Dept: FAMILY MEDICINE CLINIC | Facility: CLINIC | Age: 33
End: 2023-07-11

## 2023-07-11 NOTE — TELEPHONE ENCOUNTER
Pt called in asking if he would be able to receive a call from you in regards to his appointment. 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 He did say he has symptoms is for HVS2 but is confused because of what his lab results came back as. He did ask if he can get retested as well.      Please advise

## 2023-07-11 NOTE — TELEPHONE ENCOUNTER
Patient called back and scheduled an appointment to go over his results. He states he is confused and that he is not having symptoms of either HSV1 or HSV2. He wants to discuss the results with you in person. He is scheduled for 7/13.

## 2023-07-12 ENCOUNTER — TELEPHONE (OUTPATIENT)
Dept: RHEUMATOLOGY | Facility: CLINIC | Age: 33
End: 2023-07-12

## 2023-07-12 ENCOUNTER — OFFICE VISIT (OUTPATIENT)
Dept: RHEUMATOLOGY | Facility: CLINIC | Age: 33
End: 2023-07-12
Payer: COMMERCIAL

## 2023-07-12 VITALS
SYSTOLIC BLOOD PRESSURE: 120 MMHG | BODY MASS INDEX: 32.22 KG/M2 | HEIGHT: 68 IN | WEIGHT: 212.6 LBS | DIASTOLIC BLOOD PRESSURE: 80 MMHG

## 2023-07-12 DIAGNOSIS — M47.819 SPONDYLOARTHRITIS: Primary | ICD-10-CM

## 2023-07-12 DIAGNOSIS — M45.0 ANKYLOSING SPONDYLITIS OF MULTIPLE SITES IN SPINE (HCC): ICD-10-CM

## 2023-07-12 LAB — HLA-B27 QL NAA+PROBE: NEGATIVE

## 2023-07-12 PROCEDURE — 99214 OFFICE O/P EST MOD 30 MIN: CPT | Performed by: INTERNAL MEDICINE

## 2023-07-12 RX ORDER — PREDNISONE 5 MG/1
TABLET ORAL
Qty: 30 TABLET | Refills: 2 | Status: SHIPPED | OUTPATIENT
Start: 2023-07-12 | End: 2023-07-27

## 2023-07-12 NOTE — TELEPHONE ENCOUNTER
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 He has tried and failed NSAIDs and prednisone.

## 2023-07-12 NOTE — PROGRESS NOTES
Assessment and Plan:   Spondyloarthropathy with peripheral joint involvement. Check HLA B27, xray SI joints  Topical NSAIDs/analgesics PRN joint/back pain  PT for LS spine and knees  Begin Enbrel for Non-radiographic ankylosing spondylitis. Prednisone taper PRN joint pain/swelling  Anti-inflammatory diet/exercise/weight control  Increase CV fitness/aerobic activity  Patient to continue to monitor symptoms  Topical NSAIDs/analgesics PRN joint pain  Continue to monitor labs including CRP  F/u in 6 mos. Rheumatic Disease Summary  As above    Here for f/u visit. Still with inflammatory spinal pain and peripheral arthritis despite meloxicam, Aleve and Tylenol. Also with worsening malaise and fatigue. Back Pain  This is a recurrent problem. The current episode started more than 1 year ago. The problem occurs constantly. The problem has been rapidly worsening since onset. The pain is present in the lumbar spine. The quality of the pain is described as aching, shooting and stabbing. The pain radiates to the left foot, left knee, left thigh, right foot and right knee. The pain is at a severity of 8/10. The pain is the same all the time. The symptoms are aggravated by bending, coughing, position, lying down, sitting, standing, stress and twisting. Stiffness is present all day. Associated symptoms include abdominal pain, bladder incontinence, bowel incontinence, dysuria, headaches, leg pain, numbness, paresthesias, tingling and weakness. Pertinent negatives include no chest pain, fever, paresis, pelvic pain, perianal numbness or weight loss. The following portions of the patient's history were reviewed and updated as appropriate: allergies, current medications, past family history, past medical history, past social history, past surgical history and problem list.    Review of Systems:   Review of Systems   Constitutional: Positive for fatigue. Negative for fever and weight loss.    HENT: Negative for mouth sores.    Eyes: Negative for pain. Respiratory: Negative for shortness of breath. Cardiovascular: Negative for chest pain and leg swelling. Gastrointestinal: Positive for abdominal pain and bowel incontinence. Genitourinary: Positive for bladder incontinence and dysuria. Negative for pelvic pain. Musculoskeletal: Positive for arthralgias and back pain. Negative for joint swelling. Skin: Negative for rash. Neurological: Positive for tingling, weakness, numbness, headaches and paresthesias. Hematological: Negative for adenopathy. Psychiatric/Behavioral: Negative for sleep disturbance. Home Medications:    Current Outpatient Medications:   •  albuterol (PROVENTIL HFA,VENTOLIN HFA) 90 mcg/act inhaler, Inhale 2 puffs every 6 (six) hours as needed, Disp: , Rfl:   •  buprenorphine (SUBUTEX) 2 mg, 2 mg daily, Disp: , Rfl:   •  buprenorphine (SUBUTEX) 8 mg, 8 mg 2 (two) times a day, Disp: , Rfl:     Objective: There were no vitals filed for this visit. Physical Exam  Constitutional:       General: He is not in acute distress. HENT:      Head: Normocephalic and atraumatic. Eyes:      Conjunctiva/sclera: Conjunctivae normal.   Cardiovascular:      Rate and Rhythm: Normal rate and regular rhythm. Heart sounds: S1 normal and S2 normal.      No friction rub. Pulmonary:      Effort: Pulmonary effort is normal. No respiratory distress. Breath sounds: Normal breath sounds. No wheezing, rhonchi or rales. Musculoskeletal:      Cervical back: Neck supple. Lumbar back: Tenderness present. Decreased range of motion. Skin:     Coloration: Skin is not pale. Neurological:      Mental Status: He is alert. Mental status is at baseline. Psychiatric:         Mood and Affect: Mood normal.         Behavior: Behavior normal.         Reviewed labs and imaging. Imaging:   No results found.     Labs:   Appointment on 07/03/2023   Component Date Value Ref Range Status   • Sodium 07/03/2023 142  135 - 147 mmol/L Final   • Potassium 07/03/2023 3.9  3.5 - 5.3 mmol/L Final   • Chloride 07/03/2023 110 (H)  96 - 108 mmol/L Final   • CO2 07/03/2023 26  21 - 32 mmol/L Final   • ANION GAP 07/03/2023 6  mmol/L Final   • BUN 07/03/2023 19  5 - 25 mg/dL Final   • Creatinine 07/03/2023 0.97  0.60 - 1.30 mg/dL Final    Standardized to IDMS reference method   • Glucose, Fasting 07/03/2023 108 (H)  65 - 99 mg/dL Final    Specimen collection should occur prior to Sulfasalazine administration due to the potential for falsely depressed results. Specimen collection should occur prior to Sulfapyridine administration due to the potential for falsely elevated results. • Calcium 07/03/2023 9.3  8.3 - 10.1 mg/dL Final   • AST 07/03/2023 24  5 - 45 U/L Final    Specimen collection should occur prior to Sulfasalazine administration due to the potential for falsely depressed results. • ALT 07/03/2023 45  12 - 78 U/L Final    Specimen collection should occur prior to Sulfasalazine and/or Sulfapyridine administration due to the potential for falsely depressed results. • Alkaline Phosphatase 07/03/2023 95  46 - 116 U/L Final   • Total Protein 07/03/2023 6.9  6.4 - 8.4 g/dL Final   • Albumin 07/03/2023 4.4  3.5 - 5.0 g/dL Final   • Total Bilirubin 07/03/2023 0.39  0.20 - 1.00 mg/dL Final    Use of this assay is not recommended for patients undergoing treatment with eltrombopag due to the potential for falsely elevated results. • eGFR 07/03/2023 101  ml/min/1.73sq m Final   • Testosterone, Free 07/03/2023 3.8 (L)  8.7 - 25.1 pg/mL Final   • TESTOSTERONE TOTAL 07/03/2023 299  264 - 916 ng/dL Final    Adult male reference interval is based on a population of  healthy nonobese males (BMI <30) between 23and 44years old. [address] [address] [address],[place] [address], [address] [facility] [id number]. PMID: [id number].    • Prolactin 07/03/2023 11.82  2.64 - 13.13 ng/mL Final   • LH 07/03/2023 3.5  1.2 - 8.6 mIU/mL Final   • FSH 07/03/2023 2.7  1.3 - 19.3 mIU/mL Final • WBC 07/03/2023 6.32  4.31 - 10.16 Thousand/uL Final   • RBC 07/03/2023 5.15  3.88 - 5.62 Million/uL Final   • Hemoglobin 07/03/2023 14.7  12.0 - 17.0 g/dL Final   • Hematocrit 07/03/2023 42.8  36.5 - 49.3 % Final   • MCV 07/03/2023 83  82 - 98 fL Final   • MCH 07/03/2023 28.5  26.8 - 34.3 pg Final   • MCHC 07/03/2023 34.3  31.4 - 37.4 g/dL Final   • RDW 07/03/2023 12.3  11.6 - 15.1 % Final   • MPV 07/03/2023 10.9  8.9 - 12.7 fL Final   • Platelets 98/22/9029 229  149 - 390 Thousands/uL Final   • nRBC 07/03/2023 0  /100 WBCs Final   • Neutrophils Relative 07/03/2023 41 (L)  43 - 75 % Final   • Immat GRANS % 07/03/2023 0  0 - 2 % Final   • Lymphocytes Relative 07/03/2023 52 (H)  14 - 44 % Final   • Monocytes Relative 07/03/2023 4  4 - 12 % Final   • Eosinophils Relative 07/03/2023 2  0 - 6 % Final   • Basophils Relative 07/03/2023 1  0 - 1 % Final   • Neutrophils Absolute 07/03/2023 2.57  1.85 - 7.62 Thousands/µL Final   • Immature Grans Absolute 07/03/2023 0.02  0.00 - 0.20 Thousand/uL Final   • Lymphocytes Absolute 07/03/2023 3.28  0.60 - 4.47 Thousands/µL Final   • Monocytes Absolute 07/03/2023 0.27  0.17 - 1.22 Thousand/µL Final   • Eosinophils Absolute 07/03/2023 0.15  0.00 - 0.61 Thousand/µL Final   • Basophils Absolute 07/03/2023 0.03  0.00 - 0.10 Thousands/µL Final   • PSA, Diagnostic 07/03/2023 1.14  0.00 - 4.00 ng/mL Final    [employer] Access chemiluminescent immunoassay. Confirm baseline values for patients being serially monitored. • CRP 07/03/2023 <3.0  <3.0 mg/L Final   • HLA B27 07/03/2023 Negative   Final    HLA-B*27 Negative  B27 allele interpretation for all loci based on IMGT/HLA  database version 3.44  This test was developed and its performance characteristics  determined by [facility].  It has not been cleared or approved  by the Food and Drug Administration.   [facility] CLIA ID Number [id number]  THis test was performed using Polymerase Chain Reaction (PCR) and  Sequence Specific Oligonucleotide Probes (SSOP) technique. Sequence  Based Typing (SBT) may be used as a supplemental method when  necessary. If you have questions, please call [facility] customer service at  [phone] or email at [email or web address]. • Sed Rate 07/03/2023 5  0 - 14 mm/hour Final   • Vitamin B-12 07/03/2023 592  180 - 914 pg/mL Final   • Vit D, 25-Hydroxy 07/03/2023 29.9 (L)  30.0 - 100.0 ng/mL Final    Vitamin D guidelines established by Clinical Guidelines Subcommittee  of the Endocrine Society Task Force, 2011    Deficiency <20ng/ml   Insufficiency 20-30ng/ml   Sufficient  ng/ml    • Hepatitis B Surface Ag 07/03/2023 Non-reactive  Non-Reactive Final   • Hepatitis C Ab 07/03/2023 Non-reactive  Non-Reactive Final   • QFT Nil 07/03/2023 0.05  0 - 8.0 IU/ml Final   • QFT TB1-NIL 07/03/2023 -0.02  IU/ml Final   • QFT TB2-NIL 07/03/2023 -0.01  IU/ml Final   • QFT Mitogen-NIL 07/03/2023 >10.00  IU/ml Final   • QFT Final Interpretation 07/03/2023 Negative  Negative Final    No Interferon-gamma response to M. tuberculosis antigens detected. Infection with M. tuberculosis is unlikely. A single negative result does not exclude infection with M. tuberculosis. In patients at high risk for M. tuberculosis infection, a second test should be considered in accordance with the 2017 ATS/IDSA/CDC Clinical Practice Guidelines for Diagnosis of Tuberculosis in Adults and Children. False negative results can be a result of incorrect blood sample collection or handling of the specimen affecting lymphocyte function. • TSH 3RD GENERATON 07/03/2023 2.046  0.450 - 4.500 uIU/mL Final    Adult TSH (3rd generation) reference range follows the recommended guidelines of the American Thyroid Association, January, 2020.    • Cholesterol 07/03/2023 159  See Comment mg/dL Final    Cholesterol:         Pediatric <18 Years        Desirable          <170 mg/dL      Borderline High    170-199 mg/dL      High               >=200 mg/dL        Adult >=18 Years            Desirable         <200 mg/dL      Borderline High   200-239 mg/dL      High              >239 mg/dL     • Triglycerides 07/03/2023 395 (H)  See Comment mg/dL Final    Triglyceride:     0-9Y            <75mg/dL     10Y-17Y         <90 mg/dL       >=18Y     Normal          <150 mg/dL     Borderline High 150-199 mg/dL     High            200-499 mg/dL        Very High       >499 mg/dL    Specimen collection should occur prior to N-Acetylcysteine or Metamizole administration due to the potential for falsely depressed results. • HDL, Direct 07/03/2023 37 (L)  >=40 mg/dL Final    Specimen collection should occur prior to Metamizole administration due to the potential for falsley depressed results. • LDL Calculated 07/03/2023 43  0 - 100 mg/dL Final    LDL Cholesterol:     Optimal           <100 mg/dl     Near Optimal      100-129 mg/dl     Above Optimal       Borderline High 130-159 mg/dl       High            160-189 mg/dl       Very High       >189 mg/dl         This screening LDL is a calculated result. It does not have the accuracy of the Direct Measured LDL in the monitoring of patients with hyperlipidemia and/or statin therapy. Direct Measure LDL (YEW600) must be ordered separately in these patients. • Non-HDL-Chol (CHOL-HDL) 07/03/2023 122  mg/dl Final   • Syphilis Total Antibody 07/03/2023 Non-reactive  Non-Reactive Final    No serological evidence of infection with T. pallidum. Early or incubating syphilis infection cannot be excluded. Consider repeat testing based on clinical suspicion. • HIV-1 p24 Antigen 07/03/2023 Non-Reactive  Non-Reactive Final   • HIV-1 Antibody 07/03/2023 Non-Reactive  Non-Reactive Final   • HIV-2 Antibody 07/03/2023 Non-Reactive  Non-Reactive Final   • HIV Ag-Ab 5th Gen 07/03/2023 Non-Reactive  Non-Reactive Final    A Non-Reactive test result does not preclude the possibility of exposure or infection with HIV-1 and/or HIV-2.   Non-Reactive results can occur if the quantity of marker present is below the detection limits or is not present during the stage of disease in which a sample is collected. Repeat testing should be considered where there is clinical suspicion of infection. • HSV 1 IgG 07/03/2023 47.10 (H)  0.00 - 0.90 index Final                                     Negative        <0.91                                   Equivocal 0.91 - 1.09                                   Positive        >1.09   Note: Negative indicates no antibodies detected to   HSV-1. Equivocal may suggest early infection. If   clinically appropriate, retest at later date. Positive   indicates antibodies detected to HSV-1.   • HSV 2 IGG, TYPE SPEC 07/03/2023 <0.91  0.00 - 0.90 index Final                                     Negative        <0.91                                   Equivocal 0.91 - 1.09                                   Positive        >1.09   Note: Negative indicates no HSV-2 antibodies detected. Positive indicates HSV-2 antibodies detected. Equivocal and low positive HSV-2 screens   (Index 0.91-5.00) may be false positive and are   reflexed to supplemental testing in accordance with   CDC guidelines.    Office Visit on 06/30/2023   Component Date Value Ref Range Status   • Color, UA 06/30/2023 Yellow   Final   • Clarity, UA 06/30/2023 Clear   Final   • Specific Gravity, UA 06/30/2023 1.022  1.003 - 1.030 Final   • pH, UA 06/30/2023 6.0  4.5, 5.0, 5.5, 6.0, 6.5, 7.0, 7.5, 8.0 Final   • Leukocytes, UA 06/30/2023 Negative  Negative Final   • Nitrite, UA 06/30/2023 Negative  Negative Final   • Protein, UA 06/30/2023 Trace (A)  Negative mg/dl Final   • Glucose, UA 06/30/2023 Negative  Negative mg/dl Final   • Ketones, UA 06/30/2023 Negative  Negative mg/dl Final   • Urobilinogen, UA 06/30/2023 <2.0  <2.0 mg/dl mg/dl Final   • Bilirubin, UA 06/30/2023 Negative  Negative Final   • Occult Blood, UA 06/30/2023 Negative  Negative Final   • N gonorrhoeae, DNA Probe 06/30/2023 Negative  Negative Final   • Chlamydia trachomatis, DNA Probe 06/30/2023 Negative  Negative Final   • RBC, UA 06/30/2023 1-2  None Seen, 1-2 /hpf Final   • WBC, UA 06/30/2023 1-2  None Seen, 1-2 /hpf Final   • Epithelial Cells 06/30/2023 Occasional  None Seen, Occasional /hpf Final   • Bacteria, UA 06/30/2023 Occasional  None Seen, Occasional /hpf Final   • MUCUS THREADS 06/30/2023 Occasional (A)  None Seen Final   Office Visit on 06/09/2023   Component Date Value Ref Range Status   •  RAPID STREP A 06/09/2023 Negative  Negative Final   • Throat Culture 06/09/2023 Negative for beta-hemolytic Streptococcus   Final   Hospital Outpatient Visit on 02/01/2023   Component Date Value Ref Range Status   • Case Report 02/01/2023    Final                    Value:Surgical Pathology Report                         Case: [id number]                                   Authorizing Provider:  Nohelia Forte MD          Collected:           02/01/2023 0947              Ordering Location:     [address] [place] [address] Received:            02/01/2023 1412                                     Endoscopy                                                                    Pathologist:           Donnie Ojeda MD                                                          Specimens:   A) - Duodenum, BX, R/O CELIAC                                                                       B) - Stomach, BX, R/O H PYLORI                                                                      C) - Esophagus, BX, R/O EOE                                                               • Final Diagnosis 02/01/2023    Final                    Value: This result contains rich text formatting which cannot be displayed here. • Additional Information 02/01/2023    Final                    Value: This result contains rich text formatting which cannot be displayed here. • Gross Description 02/01/2023    Final                    Value: This result contains rich text formatting which cannot be displayed here.

## 2023-07-12 NOTE — PATIENT INSTRUCTIONS
Take the prednisone as directed for any joint inflammation. We will begin the insurance authorization process for the Enbrel for your inflammatory back arthritis and other joint pain and stiffness. Do not take the Enbrel for any signs of infection. Please make an appointment with the dermatologist once you begin the Enbrel to check for non-melanoma skin cancer.

## 2023-07-13 ENCOUNTER — OFFICE VISIT (OUTPATIENT)
Dept: FAMILY MEDICINE CLINIC | Facility: CLINIC | Age: 33
End: 2023-07-13

## 2023-07-13 VITALS
WEIGHT: 215.38 LBS | SYSTOLIC BLOOD PRESSURE: 152 MMHG | DIASTOLIC BLOOD PRESSURE: 98 MMHG | HEIGHT: 68 IN | BODY MASS INDEX: 32.64 KG/M2 | OXYGEN SATURATION: 97 % | HEART RATE: 99 BPM | TEMPERATURE: 98.9 F

## 2023-07-13 DIAGNOSIS — B00.9 HSV-1 INFECTION: ICD-10-CM

## 2023-07-13 DIAGNOSIS — E78.5 HYPERLIPIDEMIA, UNSPECIFIED HYPERLIPIDEMIA TYPE: Primary | ICD-10-CM

## 2023-07-13 NOTE — PROGRESS NOTES
Name: Lavell Dugan      : 1990      MRN: [id number]  Encounter Provider: NAIMA Cannon  Encounter Date: 2023   Encounter department: [address] [place] [address] 60     1. Hyperlipidemia, unspecified hyperlipidemia type    2. HSV-1 infection           Subjective      Presents for follow-up on labs as he was HSV positive and had questions. Patient is positive for HSV 1. Does not have any symptoms and never has. He was worried that this would be possibly genital symptoms that he could be transferred to his partner. Spoke with patient regarding transfer of HSV-1 from saliva and he verbalized understanding. Triglycerides were very elevated at 395. He has recently made changes in his diet would like to see how his numbers improve with these dietary changes. Counseled on diet and exercise. Repeat labs in 4 months. No other complaints or concerns. Review of Systems   Constitutional: Positive for fatigue. Negative for activity change, appetite change, diaphoresis, fever and unexpected weight change. HENT: Negative for congestion, mouth sores, rhinorrhea, sinus pain, trouble swallowing and voice change. Eyes: Negative for photophobia and visual disturbance. Respiratory: Negative for apnea, cough, chest tightness, shortness of breath and wheezing. Cardiovascular: Negative for chest pain, palpitations and leg swelling. Gastrointestinal: Negative for abdominal distention, abdominal pain, blood in stool, constipation, diarrhea, nausea and vomiting. Endocrine: Negative for cold intolerance, heat intolerance, polydipsia, polyphagia and polyuria. Genitourinary: Negative for decreased urine volume, difficulty urinating, frequency and urgency. Musculoskeletal: Positive for arthralgias. Negative for myalgias, neck pain and neck stiffness. Skin: Negative for color change, rash and wound.    Neurological: Negative for dizziness, weakness, light-headedness, numbness and headaches. Hematological: Negative for adenopathy. Does not bruise/bleed easily. Psychiatric/Behavioral: Negative for self-injury, sleep disturbance and suicidal ideas. The patient is not nervous/anxious. Current Outpatient Medications on File Prior to Visit   Medication Sig   • albuterol (PROVENTIL HFA,VENTOLIN HFA) 90 mcg/act inhaler Inhale 2 puffs every 6 (six) hours as needed   • buprenorphine (SUBUTEX) 2 mg 2 mg daily   • buprenorphine (SUBUTEX) 8 mg 8 mg 2 (two) times a day   • predniSONE 5 mg tablet Take 3 tablets (15 mg total) by mouth daily for 5 days, THEN 2 tablets (10 mg total) daily for 5 days, THEN 1 tablet (5 mg total) daily for 5 days. Objective     /98   Pulse 99   Temp 98.9 °F (37.2 °C) (Tympanic)   Ht 5' 8" (1.727 m)   Wt 97.7 kg (215 lb 6 oz)   SpO2 97%   BMI 32.75 kg/m²     Physical Exam  Vitals and nursing note reviewed. Constitutional:       General: He is not in acute distress. Appearance: Normal appearance. He is normal weight. He is not ill-appearing or toxic-appearing. Cardiovascular:      Rate and Rhythm: Normal rate and regular rhythm. Pulses: Normal pulses. Heart sounds: Normal heart sounds. No murmur heard. No friction rub. No gallop. Pulmonary:      Effort: Pulmonary effort is normal.      Breath sounds: Normal breath sounds. No wheezing, rhonchi or rales. Abdominal:      General: Abdomen is flat. Bowel sounds are normal.      Palpations: Abdomen is soft. Musculoskeletal:         General: Normal range of motion. Right lower leg: No edema. Left lower leg: No edema. Skin:     General: Skin is warm. Capillary Refill: Capillary refill takes less than 2 seconds. Findings: No bruising or lesion. Neurological:      General: No focal deficit present. Mental Status: He is alert and oriented to person, place, and time. Mental status is at baseline. Motor: No weakness. Gait: Gait normal.   Psychiatric:         Mood and Affect: Mood normal.         Behavior: Behavior normal.         Thought Content:  Thought content normal.         Judgment: Judgment normal.       NAIMA Thapa

## 2023-07-31 DIAGNOSIS — M47.819 SPONDYLOARTHRITIS: Primary | ICD-10-CM

## 2023-07-31 DIAGNOSIS — M45.0 ANKYLOSING SPONDYLITIS OF MULTIPLE SITES IN SPINE (HCC): Primary | ICD-10-CM

## 2023-07-31 RX ORDER — MELOXICAM 15 MG/1
15 TABLET ORAL DAILY
Qty: 30 TABLET | Refills: 1 | Status: SHIPPED | OUTPATIENT
Start: 2023-07-31

## 2023-08-09 DIAGNOSIS — M45.0 ANKYLOSING SPONDYLITIS OF MULTIPLE SITES IN SPINE (HCC): ICD-10-CM

## 2023-08-11 DIAGNOSIS — M45.0 ANKYLOSING SPONDYLITIS OF MULTIPLE SITES IN SPINE (HCC): ICD-10-CM

## 2023-08-16 DIAGNOSIS — M47.819 SPONDYLOARTHRITIS: ICD-10-CM

## 2023-08-21 RX ORDER — MELOXICAM 15 MG/1
15 TABLET ORAL DAILY
Qty: 30 TABLET | Refills: 0 | Status: SHIPPED | OUTPATIENT
Start: 2023-08-21

## 2023-08-22 RX ORDER — BUPRENORPHINE HYDROCHLORIDE 8 MG/1
8 TABLET SUBLINGUAL 2 TIMES DAILY
Refills: 0 | OUTPATIENT
Start: 2023-08-22

## 2023-08-24 DIAGNOSIS — M45.0 ANKYLOSING SPONDYLITIS OF MULTIPLE SITES IN SPINE (HCC): ICD-10-CM

## 2023-08-28 RX ORDER — MEDROXYPROGESTERONE ACETATE 150 MG/ML
INJECTION, SUSPENSION INTRAMUSCULAR
Qty: 4 ML | Refills: 0 | Status: SHIPPED | OUTPATIENT
Start: 2023-08-28

## 2023-08-29 LAB — MISCELLANEOUS LAB TEST RESULT: NORMAL

## 2023-09-22 DIAGNOSIS — M45.0 ANKYLOSING SPONDYLITIS OF MULTIPLE SITES IN SPINE (HCC): ICD-10-CM

## 2023-09-24 RX ORDER — MEDROXYPROGESTERONE ACETATE 150 MG/ML
INJECTION, SUSPENSION INTRAMUSCULAR
Qty: 4 ML | Refills: 0 | Status: SHIPPED | OUTPATIENT
Start: 2023-09-24

## 2023-10-18 DIAGNOSIS — M45.0 ANKYLOSING SPONDYLITIS OF MULTIPLE SITES IN SPINE (HCC): ICD-10-CM

## 2023-10-18 RX ORDER — MEDROXYPROGESTERONE ACETATE 150 MG/ML
INJECTION, SUSPENSION INTRAMUSCULAR
Qty: 4 ML | Refills: 0 | Status: SHIPPED | OUTPATIENT
Start: 2023-10-18

## 2023-10-29 DIAGNOSIS — M45.0 ANKYLOSING SPONDYLITIS OF MULTIPLE SITES IN SPINE (HCC): ICD-10-CM

## 2023-10-30 RX ORDER — MEDROXYPROGESTERONE ACETATE 150 MG/ML
50 INJECTION, SUSPENSION INTRAMUSCULAR
Qty: 4 ML | Refills: 3 | Status: SHIPPED | OUTPATIENT
Start: 2023-10-30 | End: 2023-11-01 | Stop reason: SDUPTHER

## 2023-11-01 DIAGNOSIS — M45.0 ANKYLOSING SPONDYLITIS OF MULTIPLE SITES IN SPINE (HCC): ICD-10-CM

## 2023-11-01 RX ORDER — MEDROXYPROGESTERONE ACETATE 150 MG/ML
50 INJECTION, SUSPENSION INTRAMUSCULAR
Qty: 4 ML | Refills: 0 | Status: SHIPPED | OUTPATIENT
Start: 2023-11-01

## 2023-11-12 ENCOUNTER — TELEPHONE (OUTPATIENT)
Dept: OTHER | Facility: OTHER | Age: 33
End: 2023-11-12

## 2023-11-12 NOTE — TELEPHONE ENCOUNTER
Patient is calling regarding cancelling an appointment.     Date/Time: 11/13/23 @2:00pm    Patient was rescheduled: YES [] NO [x]    Patient requesting call back to reschedule: YES [x] NO []

## 2024-01-03 ENCOUNTER — OFFICE VISIT (OUTPATIENT)
Dept: FAMILY MEDICINE CLINIC | Facility: CLINIC | Age: 34
End: 2024-01-03
Payer: COMMERCIAL

## 2024-01-03 VITALS
OXYGEN SATURATION: 98 % | HEART RATE: 81 BPM | WEIGHT: 227.8 LBS | SYSTOLIC BLOOD PRESSURE: 122 MMHG | TEMPERATURE: 99.7 F | HEIGHT: 68 IN | BODY MASS INDEX: 34.53 KG/M2 | DIASTOLIC BLOOD PRESSURE: 76 MMHG

## 2024-01-03 DIAGNOSIS — R10.2 CHRONIC PELVIC PAIN IN MALE: ICD-10-CM

## 2024-01-03 DIAGNOSIS — G89.29 CHRONIC PELVIC PAIN IN MALE: ICD-10-CM

## 2024-01-03 DIAGNOSIS — J45.30 MILD PERSISTENT ASTHMA, UNSPECIFIED WHETHER COMPLICATED: ICD-10-CM

## 2024-01-03 DIAGNOSIS — F11.20 CONTINUOUS OPIOID DEPENDENCE (HCC): ICD-10-CM

## 2024-01-03 DIAGNOSIS — M45.0 ANKYLOSING SPONDYLITIS OF MULTIPLE SITES IN SPINE (HCC): ICD-10-CM

## 2024-01-03 DIAGNOSIS — R06.02 SHORTNESS OF BREATH: ICD-10-CM

## 2024-01-03 DIAGNOSIS — G89.4 CHRONIC PAIN SYNDROME: Primary | ICD-10-CM

## 2024-01-03 PROCEDURE — 99214 OFFICE O/P EST MOD 30 MIN: CPT

## 2024-01-03 RX ORDER — BUDESONIDE AND FORMOTEROL FUMARATE DIHYDRATE 160; 4.5 UG/1; UG/1
2 AEROSOL RESPIRATORY (INHALATION) 2 TIMES DAILY
Qty: 10.2 G | Refills: 3 | Status: SHIPPED | OUTPATIENT
Start: 2024-01-03

## 2024-01-03 RX ORDER — GABAPENTIN 600 MG/1
600 TABLET ORAL 3 TIMES DAILY PRN
COMMUNITY
Start: 2023-12-03

## 2024-01-03 RX ORDER — KETOROLAC TROMETHAMINE 10 MG/1
10 TABLET, FILM COATED ORAL EVERY 6 HOURS PRN
Qty: 20 TABLET | Refills: 3 | Status: SHIPPED | OUTPATIENT
Start: 2024-01-03 | End: 2024-01-04

## 2024-01-03 RX ORDER — CLONIDINE HYDROCHLORIDE 0.3 MG/1
TABLET ORAL
COMMUNITY
Start: 2023-10-05

## 2024-01-03 RX ORDER — TAMSULOSIN HYDROCHLORIDE 0.4 MG/1
0.4 CAPSULE ORAL
Qty: 30 CAPSULE | Refills: 3 | Status: SHIPPED | OUTPATIENT
Start: 2024-01-03

## 2024-01-03 RX ORDER — BUSPIRONE HYDROCHLORIDE 15 MG/1
15 TABLET ORAL 3 TIMES DAILY PRN
COMMUNITY
Start: 2023-12-12

## 2024-01-03 RX ORDER — PAROXETINE 10 MG/1
10 TABLET, FILM COATED ORAL DAILY
COMMUNITY
Start: 2023-12-10

## 2024-01-03 NOTE — PROGRESS NOTES
"Name: Felix Rabago      : 1990      MRN: [id number]  Encounter Provider: NAIMA Callahan  Encounter Date: 1/3/2024   Encounter department: [facility]    Assessment & Plan     1. Chronic pain syndrome  -     ketorolac (TORADOL) 10 mg tablet; Take 1 tablet (10 mg total) by mouth every 6 (six) hours as needed for moderate pain    2. Ankylosing spondylitis of multiple sites in spine (HCC)  -     ketorolac (TORADOL) 10 mg tablet; Take 1 tablet (10 mg total) by mouth every 6 (six) hours as needed for moderate pain    3. Shortness of breath  -     budesonide-formoterol (Symbicort) 160-4.5 mcg/act inhaler; Inhale 2 puffs 2 (two) times a day Rinse mouth after use.  -     XR chest pa & lateral; Future; Expected date: 2024  -     Echo complete w/ contrast if indicated; Future; Expected date: 2024    4. Mild persistent asthma, unspecified whether complicated  -     budesonide-formoterol (Symbicort) 160-4.5 mcg/act inhaler; Inhale 2 puffs 2 (two) times a day Rinse mouth after use.    5. Chronic pelvic pain in male  -     tamsulosin (FLOMAX) 0.4 mg; Take 1 capsule (0.4 mg total) by mouth daily with dinner    6. Continuous opioid dependence (HCC)    Discussed f/u with Rheum. Re-establish care with Urology.  Requesting non-opiate medication for pain control. Currently on Meloxicam & Enbrel. Discussed he can stop Meloxicam and try Toradol for a short period but ultimately should f/u with Rheum for further treatment.  Complete Echo & CXR. F/u with our office in 3-4 weeks.  Discussed ER precautions with patient.     Depression Screening and Follow-up Plan: Patient was screened for depression during today's encounter. They screened negative with a PHQ-2 score of 0.      Jerome Gabriel presents in office today for SOB & chronic pain. Harvey states his SOB has been present for a few weeks and is associated with the feeling of his heart pounding. Also feels like he is \"always " "fatigued.\" He states he feels like he is \"pushing to breathe.\" Denies pulmonary or cardiac history. Denies BROWN or orthopnea. PMH includes insomnia, anxiety, social anxiety disorder, ankylosing spondylitis of multiple sites in spine and drug dependence. Denies feeling anxious or depressed. Denies URI symptoms - took home COVID test that was negative.    Also complains of inability to fully empty bladder and a chronic pain in his pelvis. Has followed with Urology in the past. Denies penile discharge or pain. Denies hematuria. Denies testicular pain. Denies dysuria or urinary frequency. Denies hx of nephrolithiasis.    Follows with the [facility].      Review of Systems   Constitutional:  Negative for chills, fatigue and fever.   HENT:  Negative for congestion, ear discharge, ear pain, facial swelling, rhinorrhea, sinus pressure, sinus pain, sore throat and trouble swallowing.    Eyes:  Negative for photophobia, pain and visual disturbance.   Respiratory:  Positive for shortness of breath. Negative for cough, chest tightness and wheezing.    Cardiovascular:  Negative for chest pain, palpitations and leg swelling.   Gastrointestinal:  Negative for abdominal pain, diarrhea, nausea and vomiting.   Genitourinary:  Positive for difficulty urinating. Negative for dysuria, flank pain and hematuria.   Musculoskeletal:  Positive for arthralgias. Negative for back pain, myalgias and neck pain.   Skin:  Negative for pallor and wound.   Neurological:  Negative for dizziness, syncope, weakness, numbness and headaches.   Psychiatric/Behavioral:  Negative for confusion and sleep disturbance.    All other systems reviewed and are negative.      Current Outpatient Medications on File Prior to Visit   Medication Sig    albuterol (PROVENTIL HFA,VENTOLIN HFA) 90 mcg/act inhaler Inhale 2 puffs every 6 (six) hours as needed    buprenorphine (SUBUTEX) 2 mg 2 mg daily    buprenorphine (SUBUTEX) 8 mg 8 mg 2 (two) times a day    busPIRone " "(BUSPAR) 15 mg tablet Take 15 mg by mouth 3 (three) times a day as needed    cloNIDine (CATAPRES) 0.3 mg tablet     gabapentin (NEURONTIN) 600 MG tablet Take 600 mg by mouth 3 (three) times a day as needed    PARoxetine (PAXIL) 10 mg tablet Take 10 mg by mouth daily       Objective     /76 (BP Location: Left arm, Patient Position: Sitting)   Pulse 81   Temp 99.7 °F (37.6 °C) (Tympanic)   Ht 5' 8\" (1.727 m)   Wt 103 kg (227 lb 12.8 oz)   SpO2 98%   BMI 34.64 kg/m²     Physical Exam  Vitals reviewed.   Constitutional:       General: He is not in acute distress.     Appearance: Normal appearance. He is well-developed and normal weight. He is not ill-appearing or toxic-appearing.   HENT:      Head: Normocephalic.      Right Ear: Tympanic membrane normal. No middle ear effusion. Tympanic membrane is not erythematous or bulging.      Left Ear: Tympanic membrane normal.  No middle ear effusion. Tympanic membrane is not erythematous or bulging.      Nose: Nose normal. No congestion or rhinorrhea.      Right Sinus: No maxillary sinus tenderness or frontal sinus tenderness.      Left Sinus: No maxillary sinus tenderness or frontal sinus tenderness.      Mouth/Throat:      Mouth: Mucous membranes are moist.      Pharynx: Oropharynx is clear. Uvula midline. No oropharyngeal exudate, posterior oropharyngeal erythema or uvula swelling.      Tonsils: No tonsillar exudate or tonsillar abscesses.   Eyes:      Extraocular Movements: Extraocular movements intact.      Conjunctiva/sclera: Conjunctivae normal.      Pupils: Pupils are equal, round, and reactive to light.   Neck:      Thyroid: No thyroid mass.   Cardiovascular:      Rate and Rhythm: Normal rate and regular rhythm.      Pulses: Normal pulses.      Heart sounds: Normal heart sounds.      Comments:   RRR no M/R/G  No pitting or unilateral edema  Nontender LE  Pulmonary:      Effort: Pulmonary effort is normal. No tachypnea or respiratory distress.      Breath " sounds: Normal breath sounds and air entry. No decreased breath sounds, wheezing, rhonchi or rales.      Comments: CTAB  Chest:      Chest wall: No tenderness.   Abdominal:      General: Bowel sounds are normal. There is no distension.      Palpations: Abdomen is soft.      Tenderness: There is no abdominal tenderness. There is no right CVA tenderness, left CVA tenderness, guarding or rebound.   Musculoskeletal:         General: Normal range of motion.      Cervical back: Normal range of motion and neck supple.      Right lower leg: No edema.      Left lower leg: No edema.   Lymphadenopathy:      Cervical: No cervical adenopathy.   Skin:     General: Skin is warm and dry.      Capillary Refill: Capillary refill takes less than 2 seconds.      Findings: No rash.   Neurological:      General: No focal deficit present.      Mental Status: He is alert and oriented to person, place, and time.      Cranial Nerves: No cranial nerve deficit.      Sensory: Sensation is intact.      Motor: Motor function is intact.      Coordination: Coordination is intact.      Gait: Gait is intact.      Deep Tendon Reflexes: Reflexes are normal and symmetric.   Psychiatric:         Attention and Perception: Attention normal.         Mood and Affect: Mood normal.         Speech: Speech normal.         Behavior: Behavior normal. Behavior is cooperative.      Comments: Appears uncomfortable       NAIMA Callahan

## 2024-01-04 PROBLEM — F11.20 CONTINUOUS OPIOID DEPENDENCE (HCC): Status: ACTIVE | Noted: 2024-01-04

## 2024-01-04 RX ORDER — KETOROLAC TROMETHAMINE 10 MG/1
10 TABLET, FILM COATED ORAL EVERY 6 HOURS PRN
Qty: 20 TABLET | Refills: 0 | Status: SHIPPED | OUTPATIENT
Start: 2024-01-04

## 2024-01-08 DIAGNOSIS — J45.30 MILD PERSISTENT ASTHMA, UNSPECIFIED WHETHER COMPLICATED: Primary | ICD-10-CM

## 2024-01-08 RX ORDER — BUDESONIDE AND FORMOTEROL FUMARATE DIHYDRATE 80; 4.5 UG/1; UG/1
2 AEROSOL RESPIRATORY (INHALATION) 2 TIMES DAILY
Qty: 10.2 G | Refills: 5 | Status: SHIPPED | OUTPATIENT
Start: 2024-01-08

## 2024-01-09 ENCOUNTER — TELEPHONE (OUTPATIENT)
Age: 34
End: 2024-01-09

## 2024-01-09 NOTE — TELEPHONE ENCOUNTER
Caller: Silvano Washington    Doctor/Office: Fei    Callback#: [phone]        Patient is requesting a transfer of care for the following reason: Location pt moved to [place]        Doctor: Fei    Would you release patient from your care?      Doctor: Sin    Would you take patient on as a patient?        Please advise,   Thank you.

## 2024-02-14 ENCOUNTER — OFFICE VISIT (OUTPATIENT)
Dept: FAMILY MEDICINE CLINIC | Facility: CLINIC | Age: 34
End: 2024-02-14
Payer: COMMERCIAL

## 2024-02-14 VITALS
OXYGEN SATURATION: 98 % | SYSTOLIC BLOOD PRESSURE: 128 MMHG | BODY MASS INDEX: 36.07 KG/M2 | HEART RATE: 69 BPM | DIASTOLIC BLOOD PRESSURE: 86 MMHG | TEMPERATURE: 97.5 F | HEIGHT: 68 IN | WEIGHT: 238 LBS

## 2024-02-14 DIAGNOSIS — M41.20 OTHER IDIOPATHIC SCOLIOSIS, UNSPECIFIED SPINAL REGION: ICD-10-CM

## 2024-02-14 DIAGNOSIS — R12 HEARTBURN: Primary | ICD-10-CM

## 2024-02-14 DIAGNOSIS — J45.909 UNCOMPLICATED ASTHMA, UNSPECIFIED ASTHMA SEVERITY, UNSPECIFIED WHETHER PERSISTENT: ICD-10-CM

## 2024-02-14 DIAGNOSIS — R06.09 DOE (DYSPNEA ON EXERTION): ICD-10-CM

## 2024-02-14 PROCEDURE — 99214 OFFICE O/P EST MOD 30 MIN: CPT | Performed by: FAMILY MEDICINE

## 2024-02-14 RX ORDER — ESCITALOPRAM OXALATE 5 MG/1
10 TABLET ORAL DAILY
COMMUNITY
Start: 2024-02-02

## 2024-02-14 RX ORDER — GABAPENTIN 800 MG/1
800 TABLET ORAL 3 TIMES DAILY PRN
COMMUNITY
Start: 2024-01-10

## 2024-02-14 RX ORDER — ALBUTEROL SULFATE 90 UG/1
2 AEROSOL, METERED RESPIRATORY (INHALATION) EVERY 6 HOURS PRN
Qty: 6.7 G | Refills: 3 | Status: SHIPPED | OUTPATIENT
Start: 2024-02-14 | End: 2024-04-14

## 2024-02-14 RX ORDER — FLUTICASONE PROPIONATE AND SALMETEROL 250; 50 UG/1; UG/1
1 POWDER RESPIRATORY (INHALATION) 2 TIMES DAILY
Qty: 60 BLISTER | Refills: 0 | Status: SHIPPED | OUTPATIENT
Start: 2024-02-14 | End: 2024-03-15

## 2024-02-14 RX ORDER — FAMOTIDINE 20 MG/1
20 TABLET, FILM COATED ORAL
Qty: 30 TABLET | Refills: 0 | Status: SHIPPED | OUTPATIENT
Start: 2024-02-14 | End: 2024-03-15

## 2024-02-14 RX ORDER — FAMOTIDINE 20 MG/1
20 TABLET, FILM COATED ORAL
Qty: 90 TABLET | Refills: 3 | Status: SHIPPED | OUTPATIENT
Start: 2024-02-14 | End: 2024-02-14

## 2024-02-14 RX ORDER — HYDROXYZINE HYDROCHLORIDE 25 MG/1
25 TABLET, FILM COATED ORAL 3 TIMES DAILY PRN
COMMUNITY
Start: 2024-01-10

## 2024-02-14 RX ORDER — FLUTICASONE PROPIONATE AND SALMETEROL 250; 50 UG/1; UG/1
1 POWDER RESPIRATORY (INHALATION) 2 TIMES DAILY
Qty: 60 BLISTER | Refills: 0 | Status: SHIPPED | OUTPATIENT
Start: 2024-02-14 | End: 2024-02-14

## 2024-02-14 NOTE — PROGRESS NOTES
Assessment/Plan:    Heartburn  EGD- [facility]Y from 2022 reviewed. No apparent abnormalities noted at that time.     Tissue biopsy results were reviewed with patient today. No acute findings noted.   Hpylori; negative    He is complaining of early morning acid taste. Almost daily.   He consumes late night meals.  No alcohol intake    No nocturnal episodes.     Tolerating oral diet.    Plan:   4 week pepcid trial  Follow up accordingly      BROWN (dyspnea on exertion)  Mostly secondary to undiagnosed asthma  Given the sympx are associateed with wheezing and change in severity with weather changes  Also with resolution of sympx post bronchodialator use.     Positive FH for Asthma    Pulm/CVS exam negative.   No peripheral edema    Plan:   Check PFT   Start Advair; patient is not on daily therapy due to high cost.   Advair pricing  checked and the patient is agreeable to get it from the pharmacy  Instructions about proper use including rinsing his mouth and washing the device tip with every use was given.   Follow 4 wks      Idiopathic scoliosis  This is a chronic condition  Chronic back pain     Neuro exam negative  ROM intact   Power 5/5       Check Scoliosis XR   Anticipate PT referral on next visit       Diagnoses and all orders for this visit:    Heartburn  -     Discontinue: famotidine (PEPCID) 20 mg tablet; Take 1 tablet (20 mg total) by mouth daily at bedtime  -     famotidine (PEPCID) 20 mg tablet; Take 1 tablet (20 mg total) by mouth daily at bedtime    BROWN (dyspnea on exertion)  -     Complete PFT with post bronchodilator; Future  -     Discontinue: Fluticasone-Salmeterol (Advair Diskus) 250-50 mcg/dose inhaler; Inhale 1 puff 2 (two) times a day Rinse mouth after use.  -     albuterol (PROVENTIL HFA,VENTOLIN HFA) 90 mcg/act inhaler; Inhale 2 puffs every 6 (six) hours as needed for wheezing or shortness of breath  -     Fluticasone-Salmeterol (Advair Diskus) 250-50 mcg/dose inhaler; Inhale 1 puff 2 (two) times a day  Rinse mouth after use.    Uncomplicated asthma, unspecified asthma severity, unspecified whether persistent  -     Complete PFT with post bronchodilator; Future  -     Discontinue: Fluticasone-Salmeterol (Advair Diskus) 250-50 mcg/dose inhaler; Inhale 1 puff 2 (two) times a day Rinse mouth after use.  -     albuterol (PROVENTIL HFA,VENTOLIN HFA) 90 mcg/act inhaler; Inhale 2 puffs every 6 (six) hours as needed for wheezing or shortness of breath  -     Fluticasone-Salmeterol (Advair Diskus) 250-50 mcg/dose inhaler; Inhale 1 puff 2 (two) times a day Rinse mouth after use.    Other idiopathic scoliosis, unspecified spinal region  -     XR entire spine (scoliosis) 4-5 vw; Future    Other orders  -     escitalopram (LEXAPRO) 5 mg tablet; Take 10 mg by mouth daily  -     hydrOXYzine HCL (ATARAX) 25 mg tablet; Take 25 mg by mouth 3 (three) times a day as needed for anxiety  -     gabapentin (NEURONTIN) 800 mg tablet; Take 800 mg by mouth 3 (three) times a day as needed          PHQ-2/9 Depression Screening    Little interest or pleasure in doing things: 0 - not at all  Feeling down, depressed, or hopeless: 0 - not at all  PHQ-2 Score: 0  PHQ-2 Interpretation: Negative depression screen            Subjective:      Patient ID: Felix Rabago is a 33 y.o. male.    33 year old male presents to the office today complaining of heartburn for the past month.  He is experiencing acidic taste on a daily basis in the morning.       Moreover, during the visit he mentions that he experiences at times, almost 2-3 times weekly shortness of breath on exertion when he walking outside or exercising. It seems to be climate dependant cold/warm weather. Associated with dry cough and wheezing. No nocturnal episodes. He reports history of asthma that he is supposed to be using a daily inhaler for however he isnt taking the daily inhaler. He reports resolution of symptoms most of the times with albuterol use. Over the paast 1 week he used  albuterol 3 times on 3 days period. Usually he uses albuterol 2-3 times every other week. PFT wasn't completed before. Family history is positive for first degree relatives with Asthma.   No associated chest pain or tightness, palpitations, dizziness or light headedness.     Furthermore, he mentions that for several years he have been experiencing mid and lower back pain, sharp in character, midline, doesn't radiate, not associated with tingling/numbness/weakness/bowel-bladder incontinence. Gradually progressing. He have history of disc protrusions per MRI thoracic/lumbar and follows up with pain medicine specialist.           Heartburn  He complains of heartburn and wheezing. He reports no abdominal pain, no chest pain, no coughing, no dysphagia, no early satiety, no hoarse voice, no nausea or no sore throat. This is a new problem. The current episode started 1 to 4 weeks ago. The problem has been unchanged. The heartburn does not wake him from sleep. The heartburn does not limit his activity. The heartburn doesn't change with position. Pertinent negatives include no fatigue. Risk factors include obesity and smoking/tobacco exposure. He has tried nothing for the symptoms. Past procedures include an EGD and H. pylori antibody titer.       The following portions of the patient's history were reviewed and updated as appropriate: allergies, current medications, past family history, past medical history, past social history, past surgical history, and problem list.    Review of Systems   Constitutional: Negative.  Negative for chills, fatigue and fever.   HENT: Negative.  Negative for congestion, hearing loss, hoarse voice and sore throat.    Eyes: Negative.  Negative for visual disturbance.   Respiratory:  Positive for shortness of breath and wheezing. Negative for cough.    Cardiovascular:  Negative for chest pain and palpitations.   Gastrointestinal:  Positive for heartburn. Negative for abdominal pain, blood in  "stool, constipation, diarrhea, dysphagia, nausea and vomiting.   Endocrine: Negative.    Genitourinary:  Negative for difficulty urinating and dysuria.   Musculoskeletal:  Positive for back pain. Negative for arthralgias and myalgias.   Skin: Negative.    Allergic/Immunologic: Negative.    Neurological:  Negative for seizures and syncope.   Hematological:  Negative for adenopathy.   Psychiatric/Behavioral: Negative.           Objective:    /86   Pulse 69   Temp 97.5 °F (36.4 °C)   Ht 5' 8\" (1.727 m)   Wt 108 kg (238 lb)   SpO2 98%   BMI 36.19 kg/m²      Physical Exam  Vitals and nursing note reviewed.   Constitutional:       General: He is not in acute distress.     Appearance: Normal appearance. He is not ill-appearing or diaphoretic.   HENT:      Head: Normocephalic and atraumatic.   Eyes:      Conjunctiva/sclera: Conjunctivae normal.   Cardiovascular:      Rate and Rhythm: Normal rate and regular rhythm.      Heart sounds: Normal heart sounds. No murmur heard.  Pulmonary:      Effort: Pulmonary effort is normal. No respiratory distress.      Breath sounds: Normal breath sounds. No wheezing, rhonchi or rales.   Abdominal:      General: There is no distension.      Palpations: Abdomen is soft. There is no mass.      Tenderness: There is no abdominal tenderness. There is no guarding or rebound.      Hernia: No hernia is present.   Musculoskeletal:      Cervical back: Normal range of motion and neck supple. No rigidity.      Right lower leg: No edema.      Left lower leg: No edema.   Lymphadenopathy:      Cervical: No cervical adenopathy.   Neurological:      Mental Status: He is alert and oriented to person, place, and time.   Psychiatric:         Mood and Affect: Mood normal.         Behavior: Behavior normal.         Thought Content: Thought content normal.         Judgment: Judgment normal.         "

## 2024-02-14 NOTE — ASSESSMENT & PLAN NOTE
This is a chronic condition  Chronic back pain     Neuro exam negative  ROM intact   Power 5/5       Check Scoliosis XR   Anticipate PT referral on next visit

## 2024-02-14 NOTE — ASSESSMENT & PLAN NOTE
Mostly secondary to undiagnosed asthma  Given the sympx are associateed with wheezing and change in severity with weather changes  Also with resolution of sympx post bronchodialator use.     Positive FH for Asthma    Pulm/CVS exam negative.   No peripheral edema    Plan:   Check PFT   Start Advair; patient is not on daily therapy due to high cost.   Advair pricing  checked and the patient is agreeable to get it from the pharmacy  Instructions about proper use including rinsing his mouth and washing the device tip with every use was given.   Follow 4 wks

## 2024-02-14 NOTE — ASSESSMENT & PLAN NOTE
EGD- [facility] from 2022 reviewed. No apparent abnormalities noted at that time.     Tissue biopsy results were reviewed with patient today. No acute findings noted.   Hpylori; negative    He is complaining of early morning acid taste. Almost daily.   He consumes late night meals.  No alcohol intake    No nocturnal episodes.     Tolerating oral diet.    Plan:   4 week pepcid trial  Follow up accordingly

## 2024-02-19 RX ORDER — ESCITALOPRAM OXALATE 5 MG/1
10 TABLET ORAL DAILY
Refills: 0 | OUTPATIENT
Start: 2024-02-19

## 2024-03-18 ENCOUNTER — HOSPITAL ENCOUNTER (OUTPATIENT)
Dept: RADIOLOGY | Facility: HOSPITAL | Age: 34
Discharge: HOME | End: 2024-03-18
Payer: COMMERCIAL

## 2024-03-18 ENCOUNTER — HOSPITAL ENCOUNTER (OUTPATIENT)
Dept: NON INVASIVE DIAGNOSTICS | Facility: HOSPITAL | Age: 34
Discharge: HOME | End: 2024-03-18
Payer: COMMERCIAL

## 2024-03-18 VITALS
WEIGHT: 238 LBS | HEIGHT: 68 IN | BODY MASS INDEX: 36.07 KG/M2 | DIASTOLIC BLOOD PRESSURE: 86 MMHG | HEART RATE: 71 BPM | SYSTOLIC BLOOD PRESSURE: 128 MMHG

## 2024-03-18 DIAGNOSIS — R06.02 SHORTNESS OF BREATH: ICD-10-CM

## 2024-03-18 DIAGNOSIS — M41.20 OTHER IDIOPATHIC SCOLIOSIS, UNSPECIFIED SPINAL REGION: ICD-10-CM

## 2024-03-18 LAB
AORTIC ROOT: 3.4 CM
APICAL FOUR CHAMBER EJECTION FRACTION: 65 %
ASCENDING AORTA: 3.1 CM
BSA FOR ECHO PROCEDURE: 2.2 M2
E WAVE DECELERATION TIME: 173 MS
E/A RATIO: 1.47
FRACTIONAL SHORTENING: 38 (ref 28–44)
INTERVENTRICULAR SEPTUM IN DIASTOLE (PARASTERNAL SHORT AXIS VIEW): 1 CM
INTERVENTRICULAR SEPTUM: 1 CM (ref 0.6–1.1)
IVC: 19 MM
LAAS-AP2: 15 CM2
LAAS-AP4: 12.7 CM2
LEFT ATRIUM SIZE: 3.8 CM
LEFT ATRIUM VOLUME (MOD BIPLANE): 29 ML
LEFT ATRIUM VOLUME INDEX (MOD BIPLANE): 13.2 ML/M2
LEFT INTERNAL DIMENSION IN SYSTOLE: 3.4 CM (ref 2.1–4)
LEFT VENTRICULAR INTERNAL DIMENSION IN DIASTOLE: 5.5 CM (ref 3.5–6)
LEFT VENTRICULAR POSTERIOR WALL IN END DIASTOLE: 1 CM
LEFT VENTRICULAR STROKE VOLUME: 99 ML
LVSV (TEICH): 99 ML
MV E'TISSUE VEL-SEP: 9 CM/S
MV PEAK A VEL: 0.51 M/S
MV PEAK E VEL: 75 CM/S
RIGHT ATRIUM AREA SYSTOLE A4C: 13.4 CM2
RIGHT VENTRICLE ID DIMENSION: 3.9 CM
SL CV LEFT ATRIUM LENGTH A2C: 5.1 CM
SL CV LV EF: 60
SL CV PED ECHO LEFT VENTRICLE DIASTOLIC VOLUME (MOD BIPLANE) 2D: 145 ML
SL CV PED ECHO LEFT VENTRICLE SYSTOLIC VOLUME (MOD BIPLANE) 2D: 46 ML
TR MAX PG: 17 MMHG
TR PEAK VELOCITY: 2.1 M/S
TRICUSPID ANNULAR PLANE SYSTOLIC EXCURSION: 2 CM
TRICUSPID VALVE PEAK REGURGITATION VELOCITY: 2.08 M/S

## 2024-03-18 PROCEDURE — 93306 TTE W/DOPPLER COMPLETE: CPT

## 2024-03-18 PROCEDURE — 93306 TTE W/DOPPLER COMPLETE: CPT | Performed by: INTERNAL MEDICINE

## 2024-03-18 PROCEDURE — 72083 X-RAY EXAM ENTIRE SPI 4/5 VW: CPT

## 2024-03-20 ENCOUNTER — HOSPITAL ENCOUNTER (OUTPATIENT)
Dept: PULMONOLOGY | Facility: HOSPITAL | Age: 34
Discharge: HOME | End: 2024-03-20
Payer: COMMERCIAL

## 2024-03-20 DIAGNOSIS — R06.09 DOE (DYSPNEA ON EXERTION): ICD-10-CM

## 2024-03-20 DIAGNOSIS — J45.909 UNCOMPLICATED ASTHMA, UNSPECIFIED ASTHMA SEVERITY, UNSPECIFIED WHETHER PERSISTENT: ICD-10-CM

## 2024-03-20 PROCEDURE — 94060 EVALUATION OF WHEEZING: CPT

## 2024-03-20 PROCEDURE — 94726 PLETHYSMOGRAPHY LUNG VOLUMES: CPT

## 2024-03-20 PROCEDURE — 94729 DIFFUSING CAPACITY: CPT

## 2024-03-20 PROCEDURE — 94760 N-INVAS EAR/PLS OXIMETRY 1: CPT

## 2024-03-20 PROCEDURE — 94726 PLETHYSMOGRAPHY LUNG VOLUMES: CPT | Performed by: INTERNAL MEDICINE

## 2024-03-20 PROCEDURE — 94729 DIFFUSING CAPACITY: CPT | Performed by: INTERNAL MEDICINE

## 2024-03-20 PROCEDURE — 94060 EVALUATION OF WHEEZING: CPT | Performed by: INTERNAL MEDICINE

## 2024-03-20 RX ORDER — ALBUTEROL SULFATE 2.5 MG/3ML
2.5 SOLUTION RESPIRATORY (INHALATION) ONCE
Status: COMPLETED | OUTPATIENT
Start: 2024-03-20 | End: 2024-03-20

## 2024-03-20 RX ADMIN — ALBUTEROL SULFATE 2.5 MG: 2.5 SOLUTION RESPIRATORY (INHALATION) at 11:08

## 2024-03-25 DIAGNOSIS — R53.83 OTHER FATIGUE: Primary | ICD-10-CM

## 2024-03-25 DIAGNOSIS — R06.83 SNORING: ICD-10-CM

## 2024-04-19 ENCOUNTER — TELEPHONE (OUTPATIENT)
Dept: FAMILY MEDICINE CLINIC | Facility: CLINIC | Age: 34
End: 2024-04-19

## 2024-04-19 NOTE — TELEPHONE ENCOUNTER
Sepideh Sanchez  4/15/2024  5:38 PM EDT Back to Top      Attempted to call patient; patient could not hear. Will try again later.    NAIMA Moon  4/15/2024  1:11 PM EDT       Patient does have scoliosis noted on x-ray not sure if this was addressed by Dr. Adams. If he is having pain and needs follow up, please let me know thanks

## 2024-04-24 ENCOUNTER — NURSE TRIAGE (OUTPATIENT)
Age: 34
End: 2024-04-24

## 2024-04-24 NOTE — TELEPHONE ENCOUNTER
"Pt advised of XR results and scheduled for a follow up with the provider to discuss pain.       Reason for Disposition   MODERATE back pain (e.g., interferes with normal activities) and present > 3 days    Answer Assessment - Initial Assessment Questions  1. ONSET: \"When did the pain begin?\"       Entire life  2. LOCATION: \"Where does it hurt?\" (upper, mid or lower back)      Low back radiates down legs and up to neck  3. SEVERITY: \"How bad is the pain?\"  (e.g., Scale 1-10; mild, moderate, or severe)    - MILD (1-3): doesn't interfere with normal activities     - MODERATE (4-7): interferes with normal activities or awakens from sleep     - SEVERE (8-10): excruciating pain, unable to do any normal activities       7/10  4. PATTERN: \"Is the pain constant?\" (e.g., yes, no; constant, intermittent)       Constantly uncomfortable  5. RADIATION: \"Does the pain shoot into your legs or elsewhere?\"      Radiates down legs and up to neck  6. CAUSE:  \"What do you think is causing the back pain?\"       scoliosis  7. BACK OVERUSE:  \"Any recent lifting of heavy objects, strenuous work or exercise?\"      denies  8. MEDICATIONS: \"What have you taken so far for the pain?\" (e.g., nothing, acetaminophen, NSAIDS)      Not at this time  9. NEUROLOGIC SYMPTOMS: \"Do you have any weakness, numbness, or problems with bowel/bladder control?\"      Problems with urinary flow  10. OTHER SYMPTOMS: \"Do you have any other symptoms?\" (e.g., fever, abdominal pain, burning with urination, blood in urine)        Problems with urinary flow    Protocols used: Back Pain-ADULT-[place]    "

## 2024-04-28 DIAGNOSIS — G89.29 CHRONIC PELVIC PAIN IN MALE: ICD-10-CM

## 2024-04-28 DIAGNOSIS — R10.2 CHRONIC PELVIC PAIN IN MALE: ICD-10-CM

## 2024-04-28 RX ORDER — TAMSULOSIN HYDROCHLORIDE 0.4 MG/1
CAPSULE ORAL
Qty: 30 CAPSULE | Refills: 3 | Status: SHIPPED | OUTPATIENT
Start: 2024-04-28

## 2024-05-01 ENCOUNTER — OFFICE VISIT (OUTPATIENT)
Dept: FAMILY MEDICINE CLINIC | Facility: CLINIC | Age: 34
End: 2024-05-01
Payer: COMMERCIAL

## 2024-05-01 VITALS
HEIGHT: 68 IN | DIASTOLIC BLOOD PRESSURE: 78 MMHG | TEMPERATURE: 97 F | SYSTOLIC BLOOD PRESSURE: 138 MMHG | HEART RATE: 74 BPM | BODY MASS INDEX: 34.74 KG/M2 | WEIGHT: 229.2 LBS | OXYGEN SATURATION: 99 %

## 2024-05-01 DIAGNOSIS — E78.1 HYPERTRIGLYCERIDEMIA: ICD-10-CM

## 2024-05-01 DIAGNOSIS — F40.10 SOCIAL ANXIETY DISORDER: ICD-10-CM

## 2024-05-01 DIAGNOSIS — R39.11 URINARY HESITANCY: ICD-10-CM

## 2024-05-01 DIAGNOSIS — R00.2 PALPITATIONS: Primary | ICD-10-CM

## 2024-05-01 DIAGNOSIS — Z23 ENCOUNTER FOR IMMUNIZATION: ICD-10-CM

## 2024-05-01 DIAGNOSIS — F41.9 ANXIETY: ICD-10-CM

## 2024-05-01 DIAGNOSIS — Z11.3 SCREENING EXAMINATION FOR STI: ICD-10-CM

## 2024-05-01 DIAGNOSIS — M45.0 ANKYLOSING SPONDYLITIS OF MULTIPLE SITES IN SPINE (HCC): ICD-10-CM

## 2024-05-01 DIAGNOSIS — R19.04 LEFT LOWER QUADRANT ABDOMINAL MASS: ICD-10-CM

## 2024-05-01 DIAGNOSIS — M41.20 OTHER IDIOPATHIC SCOLIOSIS, UNSPECIFIED SPINAL REGION: ICD-10-CM

## 2024-05-01 DIAGNOSIS — R06.02 SHORTNESS OF BREATH: ICD-10-CM

## 2024-05-01 PROCEDURE — 93000 ELECTROCARDIOGRAM COMPLETE: CPT | Performed by: NURSE PRACTITIONER

## 2024-05-01 PROCEDURE — 99214 OFFICE O/P EST MOD 30 MIN: CPT | Performed by: NURSE PRACTITIONER

## 2024-05-01 PROCEDURE — 3725F SCREEN DEPRESSION PERFORMED: CPT | Performed by: NURSE PRACTITIONER

## 2024-05-01 RX ORDER — ESCITALOPRAM OXALATE 10 MG/1
10 TABLET ORAL DAILY
COMMUNITY
Start: 2024-04-04

## 2024-05-01 RX ORDER — BUPROPION HYDROCHLORIDE 150 MG/1
TABLET, EXTENDED RELEASE ORAL
COMMUNITY
Start: 2024-04-30

## 2024-05-01 NOTE — PROGRESS NOTES
Name: Felix Rabago      : 1990      MRN: [id number]  Encounter Provider: NAIMA Moon  Encounter Date: 2024   Encounter department: [facility]    Assessment & Plan     1. Palpitations  -     POCT ECG  -     CBC and differential; Future  -     Comprehensive metabolic panel; Future  -     TSH, 3rd generation with Free T4 reflex; Future  -     Holter monitor; Future; Expected date: 2024  -     Chlamydia/GC amplified DNA by PCR; Future  -     RPR-Syphilis Screening (Total Syphilis IGG/IGM); Future  -     HIV 1/2 AG/AB w Reflex [facility] for 2 yr old and above; Future  -     Hepatitis C antibody; Future  -     HSV 1/2 IgM and Type Specific IgG; Future    2. Shortness of breath    3. Encounter for immunization  -     Pneumococcal Conjugate Vaccine 20-valent (Pcv20)    4. Hypertriglyceridemia  -     Lipid panel; Future    5. Anxiety  -     Ambulatory referral to Psych Services; Future    6. Urinary hesitancy  -     Testosterone, free, total; Future  -     US kidney and bladder with pvr; Future; Expected date: 2024  -     PSA, Total Screen; Future  -     Urinalysis with microscopic; Future    7. Ankylosing spondylitis of multiple sites in spine (HCC)  -     Ambulatory Referral to Physical Therapy; Future    8. Other idiopathic scoliosis, unspecified spinal region  -     Ambulatory Referral to Physical Therapy; Future    9. Screening examination for STI  -     Chlamydia/GC amplified DNA by PCR; Future    10. Left lower quadrant abdominal mass  -     US abdominal wall; Future; Expected date: 2024    11. Social anxiety disorder           Subjective      Patient presents for follow up.     Shortness of breath- patient states that he has been continuing to feel short of breath intermittently through out the day. States it happens at any time with exertion, resting and regular movement. No pattern. Sometimes it is only a few seconds and other times it lasts  longer. Using his albuterol multiple times per day without relief. He is also on the Advair and this has not helped. He did have PFT which were unremarkable. He does also have heart racing with his shortness of breath and at other times during the day. He states sometimes he has heart racing with anxiety but also when he is not feeling anxious. He also admits that he feels irregular beats often through out the day. He is seeing psychiatry for anxiety/depression. He is on hydroxyzine, Wellbutrin, lexapro. [facility]. Does feel like his symptoms are better controlled but are not well controlled. He still  feels anxious a lot of the time especially when he is at work and has to talk in front of people which is often. He did do talk therapy for about one year which over time was not helpful for him. Anxiety may be a contributing factor to his symptoms. Recommend f/u with psych and also CBT to help long term with symptoms.  Would stop albuterol and advair if they are not helping. He does not smoke, quit 10 years ago. EKG today as read by me; NSR. Did have recent echo, unremarkable.     Urinary symptoms- patient states  that the last few months, he is having urinary hesitancy, trouble starting his stream, start and stop with urination and difficulty ejaculating. States the Flomax has not helped with his symptoms. Does have a history of low T but could not get tx due to elevated RBC. Will recheck labs. States he has one partner but did have a new partner one time in the last one year. Does not always use condoms. Counseled on importance of protections with every sexual encounter. Recheck labs. Have u/s as ordered and follow up with urology.     Abdominal wall lump- ongoing for a few months and states that it is getting bigger and more prominent when he is standing up. No bowel changes. U/s ordered  to assess for hernia.     Back pain- Minor degree of dextroscoliosis of the lower thoracic and lumbar spine  2.  Anterior wedging with small spurs at T11 and T12, unchanged from 5/5/2021  3. No acute osseous abnormality  Did see pain management and rheumatology in the past. Will try PT and also follow up with rheumatology as needed.           Review of Systems   Constitutional:  Negative for chills and fever.   Eyes:  Negative for pain and visual disturbance.   Respiratory:  Positive for shortness of breath. Negative for cough, chest tightness and wheezing.    Cardiovascular:  Positive for palpitations. Negative for chest pain and leg swelling.   Gastrointestinal:  Negative for abdominal pain, constipation, diarrhea, nausea and vomiting.   Genitourinary:  Positive for difficulty urinating. Negative for dysuria, genital sores, hematuria, penile discharge, penile pain, penile swelling, scrotal swelling, testicular pain and urgency.   Musculoskeletal:  Positive for back pain. Negative for arthralgias.   Skin:  Negative for color change and rash.   Neurological:  Negative for seizures and syncope.   Psychiatric/Behavioral:  Positive for dysphoric mood. The patient is nervous/anxious.    All other systems reviewed and are negative.      Current Outpatient Medications on File Prior to Visit   Medication Sig   • buprenorphine (SUBUTEX) 2 mg 2 mg daily   • buprenorphine (SUBUTEX) 8 mg 8 mg 2 (two) times a day   • buPROPion (WELLBUTRIN SR) 150 mg 12 hr tablet    • escitalopram (LEXAPRO) 10 mg tablet Take 10 mg by mouth daily   • Fluticasone-Salmeterol (Advair Diskus) 250-50 mcg/dose inhaler Inhale 1 puff 2 (two) times a day Rinse mouth after use.   • gabapentin (NEURONTIN) 800 mg tablet Take 800 mg by mouth 3 (three) times a day as needed   • hydrOXYzine HCL (ATARAX) 25 mg tablet Take 25 mg by mouth 3 (three) times a day as needed for anxiety   • tamsulosin (FLOMAX) 0.4 mg take 1 capsule by mouth once daily with dinner   • [DISCONTINUED] escitalopram (LEXAPRO) 5 mg tablet Take 10 mg by mouth daily   • [DISCONTINUED] cloNIDine  "(CATAPRES) 0.3 mg tablet  (Patient not taking: Reported on 5/1/2024)   • [DISCONTINUED] famotidine (PEPCID) 20 mg tablet Take 1 tablet (20 mg total) by mouth daily at bedtime (Patient not taking: Reported on 5/1/2024)       Objective     /78 (BP Location: Left arm, Patient Position: Sitting)   Pulse 74   Temp (!) 97 °F (36.1 °C) (Tympanic)   Ht 5' 8\" (1.727 m)   Wt 104 kg (229 lb 3.2 oz)   SpO2 99%   BMI 34.85 kg/m²     Physical Exam  Vitals and nursing note reviewed.   Constitutional:       General: He is not in acute distress.     Appearance: Normal appearance. He is normal weight. He is not ill-appearing or toxic-appearing.   Cardiovascular:      Rate and Rhythm: Normal rate and regular rhythm.      Pulses: Normal pulses.      Heart sounds: Normal heart sounds. No murmur heard.     No friction rub. No gallop.   Pulmonary:      Effort: Pulmonary effort is normal.      Breath sounds: Normal breath sounds. No wheezing, rhonchi or rales.   Abdominal:      General: Abdomen is flat. Bowel sounds are normal.      Palpations: Abdomen is soft.       Musculoskeletal:         General: Normal range of motion.      Right lower leg: No edema.      Left lower leg: No edema.   Skin:     General: Skin is warm.      Capillary Refill: Capillary refill takes less than 2 seconds.      Findings: No bruising or lesion.   Neurological:      General: No focal deficit present.      Mental Status: He is alert and oriented to person, place, and time. Mental status is at baseline.      Motor: No weakness.      Gait: Gait normal.   Psychiatric:         Mood and Affect: Mood is anxious.         Behavior: Behavior normal.         Thought Content: Thought content normal.         Judgment: Judgment normal.       NAIMA Moon    "

## 2024-05-08 ENCOUNTER — SOCIAL WORK (OUTPATIENT)
Dept: BEHAVIORAL/MENTAL HEALTH CLINIC | Facility: CLINIC | Age: 34
End: 2024-05-08
Payer: COMMERCIAL

## 2024-05-08 ENCOUNTER — HOSPITAL ENCOUNTER (OUTPATIENT)
Dept: NON INVASIVE DIAGNOSTICS | Facility: HOSPITAL | Age: 34
Discharge: HOME | End: 2024-05-08
Payer: COMMERCIAL

## 2024-05-08 DIAGNOSIS — F40.10 SOCIAL ANXIETY DISORDER: Primary | ICD-10-CM

## 2024-05-08 DIAGNOSIS — R00.2 PALPITATIONS: ICD-10-CM

## 2024-05-08 PROCEDURE — 90791 PSYCH DIAGNOSTIC EVALUATION: CPT | Performed by: BEHAVIOR ANALYST

## 2024-05-08 PROCEDURE — 93226 XTRNL ECG REC<48 HR SCAN A/R: CPT

## 2024-05-08 PROCEDURE — 93225 XTRNL ECG REC<48 HRS REC: CPT

## 2024-05-08 NOTE — PSYCH
" Behavioral Health Psychotherapy Assessment    Date of Initial Psychotherapy Assessment: 05/08/24  Referral Source: PCP  Has a release of information been signed for the referral source? Yes    Preferred Name: Harvey Rabago  Preferred Pronouns: He/him  YOB: 1990 Age: 33 y.o.  Sex assigned at birth: male   Gender Identity: male  Race:   Preferred Language: English    Emergency Contact:  Full Name: Sho Rabago  Relationship to Client: wife  Contact information: [phone]    Primary Care Physician:  NAIMA Moon  [address] [facility] [address]  [phone]  Has a release of information been signed? Yes    Physical Health History:  Past surgical procedures: Appendix removed at age 16. Some back issues, scoliosis  Do you have a history of any of the following: other none  Do you have any mobility issues? No    Relevant Family History:  Harvey lives with his wife, 18 yr old son, 6 yr old son and their adopted niece age 16. He states he has good relationships with his family and things are \"great.\" He states his parents are supportive.     Presenting Problem (What brings you in?)  Harvey reports he has bad social anxiety, feels down all the time. He states he has no energy, fatigue, feeling that he needs to push himself to do anything. His anxiety presents as racing heart, feeling like he is going to have a heart attack. This began years ago and is getting worse.     Mental Health Advance Directive:  Do you currently have a Mental Health Advance Directive?no    Diagnosis:   Diagnosis ICD-10-CM Associated Orders   1. Social anxiety disorder  F40.10           Initial Assessment:     Current Mental Status:    Appearance: appropriate      Behavior/Manner: cooperative      Affect/Mood:  Anxious    Speech:  Normal    Sleep:  Insomnia and interrupted    Oriented to: oriented to self, oriented to place and oriented to time       Clinical Symptoms    Depression: yes      Anxiety: yes      " Depression Symptoms: depressed mood, serious loss of interest in things, social isolation, fatigue, sleep disturbance and irritable      Anxiety Symptoms: excessive worry, fatigues easily, muscle tension, irritable, nervous/anxious, chest tightness, shortness of breath and dizziness      Have you ever been assaultive to others or the environment: No      Have you ever been self-injurious: No      Counseling History:  Previous Counseling or Treatment  (Mental Health or Drug & Alcohol): Yes    Previous Counseling Details:  In 2018, Harvey had talk therapy for a year at the place [facility]. He has medication management through [facility] currently.   Have you previously taken psychiatric medications: Yes    Previous Medications Attempted:  Wellbutrin, Lexapro, hydroxyzine    Suicide Risk Assessment  Have you ever had a suicide attempt: No    Have you had incidents of suicidal ideation: No    Are you currently experiencing suicidal thoughts: No      Substance Abuse/Addiction Assessment:  Opiates: Yes    Age of First Use:  17  Frequency:  Daily  Amount:  5mg 3x daily  Method:  Tablet/capsule  Last Use:  2016  Tobacco/Nicotine: Yes    Age of First Use:  16  Age of regular use:  16  Frequency:  Daily  Amount:  A pack a day  Method:  Smoke/pipe  Last Use:  7 yrs ago  Are you interested in resources for smoking cessation: No    Have you experienced blackouts as a result of substance use: No    Have you had any periods of abstinence: Yes    Additional Abstinence information:  Has not used since 2016  Have you experienced symptoms of withdrawal: Yes    Withdrawal Symptoms:  Cravings, Hot/Cold Flashes, Headaches, Muscle Aches and Sweats  Have you ever overdosed on any substances?: No    Are you currently using any Medication Assisted Treatment for Substance Use: Yes    Additional MAT Information:  Subutex    Disordered Eating History:  Do you have a history of disordered eating: No      Social Determinants of Health:    SDOH:   Stress    Trauma and Abuse History:    Have you ever been abused: No      Legal History:    Have you ever been arrested  or had a DUI: No      Have you been incarcerated: No      Are you currently on parole/probation: No      Any current Children and Youth involvement: No      Any pending legal charges: No      Relationship History:    Current marital status:       Natural Supports:  Mother and father    Relationship History:  Mom and Dad, his sisters , Godfather, his aunt     Employment History    Are you currently employed: Yes      Longest period of employment:  6 years    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[employer]    Future work goals:  He is planning to keep moving up    Sources of income/financial support:  Work     History:      Status: no history of  duty  Educational History:     Have you ever been diagnosed with a learning disability: No      Highest level of education:  High school graduate    School attended/attending:  [employer]    Have you ever had an IEP or 504-plan: Yes      IEP/504 plan:  Reading and math IEP in high school    Do you need assistance with reading or writing: No      Recommended Treatment:     Psychotherapy:  Individual sessions    Frequency:  2 times    Session frequency:  Monthly         Visit start and stop times:  10:01 AM  10:55 AM    05/08/24

## 2024-05-11 ENCOUNTER — HOSPITAL ENCOUNTER (OUTPATIENT)
Dept: ULTRASOUND IMAGING | Facility: HOSPITAL | Age: 34
Discharge: HOME | End: 2024-05-11
Payer: COMMERCIAL

## 2024-05-11 DIAGNOSIS — R19.04 LEFT LOWER QUADRANT ABDOMINAL MASS: ICD-10-CM

## 2024-05-11 DIAGNOSIS — R39.11 URINARY HESITANCY: ICD-10-CM

## 2024-05-11 PROCEDURE — 76770 US EXAM ABDO BACK WALL COMP: CPT

## 2024-05-11 PROCEDURE — 76705 ECHO EXAM OF ABDOMEN: CPT

## 2024-05-15 PROCEDURE — 93227 XTRNL ECG REC<48 HR R&I: CPT | Performed by: INTERNAL MEDICINE

## 2024-05-16 ENCOUNTER — TELEPHONE (OUTPATIENT)
Dept: PSYCHIATRY | Facility: CLINIC | Age: 34
End: 2024-05-16

## 2024-05-16 NOTE — TELEPHONE ENCOUNTER
Contacted patient in regards to Routine Referral in attempts to verify patient's needs of services and add patient to proper wait list. Writer unable to leave vm due to call dropping.     Referral closed due to unable to contact pt

## 2024-05-29 ENCOUNTER — APPOINTMENT (OUTPATIENT)
Dept: LAB | Facility: CLINIC | Age: 34
End: 2024-05-29
Payer: COMMERCIAL

## 2024-05-29 DIAGNOSIS — R39.11 URINARY HESITANCY: ICD-10-CM

## 2024-05-29 DIAGNOSIS — E78.1 HYPERTRIGLYCERIDEMIA: ICD-10-CM

## 2024-05-29 DIAGNOSIS — R00.2 PALPITATIONS: ICD-10-CM

## 2024-05-29 DIAGNOSIS — Z11.3 SCREENING EXAMINATION FOR STI: ICD-10-CM

## 2024-05-29 DIAGNOSIS — E29.1 HYPOGONADISM IN MALE: ICD-10-CM

## 2024-05-29 LAB
ALBUMIN SERPL BCP-MCNC: 4.5 G/DL (ref 3.5–5)
ALP SERPL-CCNC: 69 U/L (ref 34–104)
ALT SERPL W P-5'-P-CCNC: 23 U/L (ref 7–52)
ANION GAP SERPL CALCULATED.3IONS-SCNC: 9 MMOL/L (ref 4–13)
AST SERPL W P-5'-P-CCNC: 18 U/L (ref 13–39)
BACTERIA UR QL AUTO: ABNORMAL /HPF
BASOPHILS # BLD AUTO: 0.04 THOUSANDS/ÂΜL (ref 0–0.1)
BASOPHILS NFR BLD AUTO: 1 % (ref 0–1)
BILIRUB SERPL-MCNC: 0.79 MG/DL (ref 0.2–1)
BILIRUB UR QL STRIP: NEGATIVE
BUN SERPL-MCNC: 10 MG/DL (ref 5–25)
CALCIUM SERPL-MCNC: 9.3 MG/DL (ref 8.4–10.2)
CHLORIDE SERPL-SCNC: 102 MMOL/L (ref 96–108)
CHOLEST SERPL-MCNC: 140 MG/DL
CLARITY UR: CLEAR
CO2 SERPL-SCNC: 29 MMOL/L (ref 21–32)
COLOR UR: COLORLESS
CREAT SERPL-MCNC: 0.9 MG/DL (ref 0.6–1.3)
EOSINOPHIL # BLD AUTO: 0.15 THOUSAND/ÂΜL (ref 0–0.61)
EOSINOPHIL NFR BLD AUTO: 2 % (ref 0–6)
ERYTHROCYTE [DISTWIDTH] IN BLOOD BY AUTOMATED COUNT: 14.7 % (ref 11.6–15.1)
GFR SERPL CREATININE-BSD FRML MDRD: 111 ML/MIN/1.73SQ M
GLUCOSE P FAST SERPL-MCNC: 144 MG/DL (ref 65–99)
GLUCOSE UR STRIP-MCNC: ABNORMAL MG/DL
HCT VFR BLD AUTO: 55.4 % (ref 36.5–49.3)
HCV AB SER QL: NORMAL
HDLC SERPL-MCNC: 39 MG/DL
HGB BLD-MCNC: 17.5 G/DL (ref 12–17)
HGB UR QL STRIP.AUTO: NEGATIVE
HIV 1+2 AB+HIV1 P24 AG SERPL QL IA: NORMAL
HIV 2 AB SERPL QL IA: NORMAL
HIV1 AB SERPL QL IA: NORMAL
HIV1 P24 AG SERPL QL IA: NORMAL
IMM GRANULOCYTES # BLD AUTO: 0.01 THOUSAND/UL (ref 0–0.2)
IMM GRANULOCYTES NFR BLD AUTO: 0 % (ref 0–2)
KETONES UR STRIP-MCNC: NEGATIVE MG/DL
LDLC SERPL CALC-MCNC: 78 MG/DL (ref 0–100)
LEUKOCYTE ESTERASE UR QL STRIP: NEGATIVE
LYMPHOCYTES # BLD AUTO: 1.8 THOUSANDS/ÂΜL (ref 0.6–4.47)
LYMPHOCYTES NFR BLD AUTO: 23 % (ref 14–44)
MCH RBC QN AUTO: 26.6 PG (ref 26.8–34.3)
MCHC RBC AUTO-ENTMCNC: 31.6 G/DL (ref 31.4–37.4)
MCV RBC AUTO: 84 FL (ref 82–98)
MONOCYTES # BLD AUTO: 0.41 THOUSAND/ÂΜL (ref 0.17–1.22)
MONOCYTES NFR BLD AUTO: 5 % (ref 4–12)
NEUTROPHILS # BLD AUTO: 5.4 THOUSANDS/ÂΜL (ref 1.85–7.62)
NEUTS SEG NFR BLD AUTO: 69 % (ref 43–75)
NITRITE UR QL STRIP: NEGATIVE
NON-SQ EPI CELLS URNS QL MICRO: ABNORMAL /HPF
NONHDLC SERPL-MCNC: 101 MG/DL
NRBC BLD AUTO-RTO: 0 /100 WBCS
PH UR STRIP.AUTO: 7.5 [PH]
PLATELET # BLD AUTO: 194 THOUSANDS/UL (ref 149–390)
PMV BLD AUTO: 9.5 FL (ref 8.9–12.7)
POTASSIUM SERPL-SCNC: 4.1 MMOL/L (ref 3.5–5.3)
PROT SERPL-MCNC: 6.6 G/DL (ref 6.4–8.4)
PROT UR STRIP-MCNC: NEGATIVE MG/DL
PSA SERPL-MCNC: 0.88 NG/ML (ref 0–4)
RBC # BLD AUTO: 6.59 MILLION/UL (ref 3.88–5.62)
RBC #/AREA URNS AUTO: ABNORMAL /HPF
SODIUM SERPL-SCNC: 140 MMOL/L (ref 135–147)
SP GR UR STRIP.AUTO: 1.01 (ref 1–1.03)
TREPONEMA PALLIDUM IGG+IGM AB [PRESENCE] IN SERUM OR PLASMA BY IMMUNOASSAY: NORMAL
TRIGL SERPL-MCNC: 114 MG/DL
TSH SERPL DL<=0.05 MIU/L-ACNC: 0.77 UIU/ML (ref 0.45–4.5)
UROBILINOGEN UR STRIP-ACNC: <2 MG/DL
WBC # BLD AUTO: 7.81 THOUSAND/UL (ref 4.31–10.16)
WBC #/AREA URNS AUTO: ABNORMAL /HPF

## 2024-05-29 PROCEDURE — 84402 ASSAY OF FREE TESTOSTERONE: CPT

## 2024-05-29 PROCEDURE — 80061 LIPID PANEL: CPT

## 2024-05-29 PROCEDURE — 80053 COMPREHEN METABOLIC PANEL: CPT

## 2024-05-29 PROCEDURE — 87491 CHLMYD TRACH DNA AMP PROBE: CPT

## 2024-05-29 PROCEDURE — 86694 HERPES SIMPLEX NES ANTBDY: CPT

## 2024-05-29 PROCEDURE — 84403 ASSAY OF TOTAL TESTOSTERONE: CPT

## 2024-05-29 PROCEDURE — 85025 COMPLETE CBC W/AUTO DIFF WBC: CPT

## 2024-05-29 PROCEDURE — 36415 COLL VENOUS BLD VENIPUNCTURE: CPT

## 2024-05-29 PROCEDURE — 86780 TREPONEMA PALLIDUM: CPT

## 2024-05-29 PROCEDURE — 86803 HEPATITIS C AB TEST: CPT

## 2024-05-29 PROCEDURE — 86695 HERPES SIMPLEX TYPE 1 TEST: CPT

## 2024-05-29 PROCEDURE — 87591 N.GONORRHOEAE DNA AMP PROB: CPT

## 2024-05-29 PROCEDURE — 84443 ASSAY THYROID STIM HORMONE: CPT

## 2024-05-29 PROCEDURE — 86696 HERPES SIMPLEX TYPE 2 TEST: CPT

## 2024-05-29 PROCEDURE — 87389 HIV-1 AG W/HIV-1&-2 AB AG IA: CPT

## 2024-05-29 PROCEDURE — G0103 PSA SCREENING: HCPCS

## 2024-05-29 PROCEDURE — 81001 URINALYSIS AUTO W/SCOPE: CPT

## 2024-05-30 ENCOUNTER — TELEPHONE (OUTPATIENT)
Age: 34
End: 2024-05-30

## 2024-05-30 ENCOUNTER — OFFICE VISIT (OUTPATIENT)
Dept: FAMILY MEDICINE CLINIC | Facility: CLINIC | Age: 34
End: 2024-05-30
Payer: COMMERCIAL

## 2024-05-30 VITALS
HEIGHT: 68 IN | HEART RATE: 83 BPM | OXYGEN SATURATION: 98 % | WEIGHT: 223.2 LBS | SYSTOLIC BLOOD PRESSURE: 126 MMHG | BODY MASS INDEX: 33.83 KG/M2 | DIASTOLIC BLOOD PRESSURE: 84 MMHG | TEMPERATURE: 97.1 F

## 2024-05-30 DIAGNOSIS — Z23 ENCOUNTER FOR IMMUNIZATION: ICD-10-CM

## 2024-05-30 DIAGNOSIS — R10.2 CHRONIC PELVIC PAIN IN MALE: ICD-10-CM

## 2024-05-30 DIAGNOSIS — R73.9 HYPERGLYCEMIA: ICD-10-CM

## 2024-05-30 DIAGNOSIS — M62.838 NECK MUSCLE SPASM: ICD-10-CM

## 2024-05-30 DIAGNOSIS — K21.9 GASTROESOPHAGEAL REFLUX DISEASE, UNSPECIFIED WHETHER ESOPHAGITIS PRESENT: Primary | ICD-10-CM

## 2024-05-30 DIAGNOSIS — G44.209 TENSION HEADACHE: ICD-10-CM

## 2024-05-30 DIAGNOSIS — G89.29 CHRONIC PELVIC PAIN IN MALE: ICD-10-CM

## 2024-05-30 DIAGNOSIS — R35.0 URINARY FREQUENCY: ICD-10-CM

## 2024-05-30 LAB
C TRACH DNA SPEC QL NAA+PROBE: NEGATIVE
N GONORRHOEA DNA SPEC QL NAA+PROBE: NEGATIVE
SL AMB POCT HEMOGLOBIN AIC: 5.8 (ref ?–6.5)
TESTOST FREE SERPL-MCNC: 36.9 PG/ML (ref 8.7–25.1)
TESTOST SERPL-MCNC: 1342 NG/DL (ref 264–916)

## 2024-05-30 PROCEDURE — 99214 OFFICE O/P EST MOD 30 MIN: CPT | Performed by: NURSE PRACTITIONER

## 2024-05-30 PROCEDURE — 83036 HEMOGLOBIN GLYCOSYLATED A1C: CPT | Performed by: NURSE PRACTITIONER

## 2024-05-30 RX ORDER — TAMSULOSIN HYDROCHLORIDE 0.4 MG/1
0.8 CAPSULE ORAL
Qty: 30 CAPSULE | Refills: 3 | Status: SHIPPED | OUTPATIENT
Start: 2024-05-30

## 2024-05-30 RX ORDER — BLOOD SUGAR DIAGNOSTIC
STRIP MISCELLANEOUS
Qty: 100 EACH | Refills: 3 | Status: SHIPPED | OUTPATIENT
Start: 2024-05-30

## 2024-05-30 RX ORDER — LANCETS 33 GAUGE
EACH MISCELLANEOUS
Qty: 100 EACH | Refills: 3 | Status: SHIPPED | OUTPATIENT
Start: 2024-05-30

## 2024-05-30 RX ORDER — METHOCARBAMOL 500 MG/1
500 TABLET, FILM COATED ORAL 3 TIMES DAILY PRN
Qty: 60 TABLET | Refills: 0 | Status: SHIPPED | OUTPATIENT
Start: 2024-05-30

## 2024-05-30 RX ORDER — PANTOPRAZOLE SODIUM 40 MG/1
40 TABLET, DELAYED RELEASE ORAL DAILY
Qty: 60 TABLET | Refills: 1 | Status: SHIPPED | OUTPATIENT
Start: 2024-05-30

## 2024-05-30 RX ORDER — BLOOD-GLUCOSE METER
KIT MISCELLANEOUS
Qty: 1 KIT | Refills: 0 | Status: SHIPPED | OUTPATIENT
Start: 2024-05-30

## 2024-05-30 NOTE — PROGRESS NOTES
Ambulatory Visit  Name: Felix Rabago      : 1990      MRN: [id number]  Encounter Provider: NAIMA Moon  Encounter Date: 2024   Encounter department: [facility]    Assessment & Plan   1. Gastroesophageal reflux disease, unspecified whether esophagitis present  -     pantoprazole (PROTONIX) 40 mg tablet; Take 1 tablet (40 mg total) by mouth daily  2. Encounter for immunization  -     Pneumococcal Conjugate Vaccine 20-valent (Pcv20)  3. Hyperglycemia  -     POCT hemoglobin A1c  -     Blood Glucose Monitoring Suppl (OneTouch Verio Reflect) w/Device KIT; Check blood sugars once daily. Please substitute with appropriate alternative as covered by patient's insurance. Dx: E11.65  -     glucose blood (OneTouch Verio) test strip; Check blood sugars once daily. Please substitute with appropriate alternative as covered by patient's insurance. Dx: E11.65  -     OneTouch Delica Lancets 33G MISC; Check blood sugars once daily. Please substitute with appropriate alternative as covered by patient's insurance. Dx: E11.65  4. Chronic pelvic pain in male  -     tamsulosin (FLOMAX) 0.4 mg; Take 2 capsules (0.8 mg total) by mouth daily with dinner  5. Urinary frequency  -     tamsulosin (FLOMAX) 0.4 mg; Take 2 capsules (0.8 mg total) by mouth daily with dinner  -     Ambulatory Referral to Urology; Future  6. Tension headache  -     Ambulatory Referral to Physical Therapy; Future  -     methocarbamol (ROBAXIN) 500 mg tablet; Take 1 tablet (500 mg total) by mouth 3 (three) times a day as needed for muscle spasms (muscle tension)  7. Neck muscle spasm  -     Ambulatory Referral to Physical Therapy; Future  -     methocarbamol (ROBAXIN) 500 mg tablet; Take 1 tablet (500 mg total) by mouth 3 (three) times a day as needed for muscle spasms (muscle tension)       History of Present Illness   {Disappearing Hyperlinks I Encounters * My Last Note * Since Last Visit * History  :[id number]}  Patient presents for follow up.   Still having urinary symptoms- frequency, urgency and feel like not emptying bladder. PVR is low at 7. Does have glucose in the urine and FG was 140s. HGA1C is 5.8. is eating lots of carbs and sugars. Drinks 4-5 red bull per day. Eliminate red bulls, increase hydration, follow low sugar/carb diet. Increase exercise. Check fasting glucose 3 times per week and keep log. Send in 2-3 weeks if >130 consistently.   Does have acid reflux- burning in the stomach up into the throat, nausea in the morning. Gets worse with certain foods like red sauce so avoid. Lifestyle modification reviewed. Start Protonix 40 mg 30 minutes before breakfast.   Does get pressure behind the eyes, headaches every day. Neck pain and tension causes headaches. Having anxiety medications adjusted. Recommend heat, massage for neck pain, robaxin prn for spasms and tylenol for pain.         Review of Systems   Constitutional:  Negative for chills and fever.   HENT:  Negative for ear pain and sore throat.    Eyes:  Negative for pain and visual disturbance.   Respiratory:  Negative for cough and shortness of breath.    Cardiovascular:  Negative for chest pain and palpitations.   Gastrointestinal:  Positive for abdominal pain and nausea. Negative for constipation, diarrhea and vomiting.   Genitourinary:  Positive for decreased urine volume, difficulty urinating, frequency and urgency. Negative for dysuria and hematuria.   Musculoskeletal:  Positive for neck pain and neck stiffness. Negative for arthralgias and back pain.   Skin:  Negative for color change and rash.   Neurological:  Positive for headaches. Negative for seizures and syncope.   All other systems reviewed and are negative.      Objective   {Disappearing Hyperlinks   Review Vitals * Enter New Vitals * Results Review * Labs * Imaging * Cardiology * Procedures * Lung Cancer Screening :[id number]}  /84   Pulse 83   Temp (!) 97.1 °F (36.2 °C)  "(Tympanic)   Ht 5' 8\" (1.727 m)   Wt 101 kg (223 lb 3.2 oz)   SpO2 98%   BMI 33.94 kg/m²     Physical Exam  Vitals and nursing note reviewed.   Constitutional:       General: He is not in acute distress.     Appearance: He is well-developed.   HENT:      Head: Normocephalic and atraumatic.   Eyes:      Conjunctiva/sclera: Conjunctivae normal.   Cardiovascular:      Rate and Rhythm: Normal rate and regular rhythm.      Pulses: Normal pulses.      Heart sounds: Normal heart sounds. No murmur heard.  Pulmonary:      Effort: Pulmonary effort is normal. No respiratory distress.      Breath sounds: Normal breath sounds.   Abdominal:      Palpations: Abdomen is soft.      Tenderness: There is no abdominal tenderness.   Musculoskeletal:         General: No swelling.      Cervical back: Neck supple. Spasms and tenderness present. No bony tenderness.   Skin:     General: Skin is warm and dry.      Capillary Refill: Capillary refill takes less than 2 seconds.   Neurological:      Mental Status: He is alert.   Psychiatric:         Mood and Affect: Mood normal.       Administrative Statements {Disappearing Hyperlinks I  [id number]}          "

## 2024-05-31 ENCOUNTER — TELEPHONE (OUTPATIENT)
Dept: FAMILY MEDICINE CLINIC | Facility: CLINIC | Age: 34
End: 2024-05-31

## 2024-05-31 NOTE — TELEPHONE ENCOUNTER
Susy Young  5/31/2024 11:28 AM EDT Back to Top      Pt was notified and is taking Test boost max and takes it 3 times a day. He is unsure at the moment of the mg as he is at work and doesn't have it with him but can send a picture through the portal later if you need him to.    NAIMA Moon  5/31/2024  9:54 AM EDT       Patients testosterone is high. Please verify if he is taking any testosterone replacement or supplements thanks

## 2024-06-03 LAB — SCAN RESULT: NORMAL

## 2024-06-04 DIAGNOSIS — R79.89 ELEVATED TESTOSTERONE LEVEL: Primary | ICD-10-CM

## 2024-06-04 NOTE — TELEPHONE ENCOUNTER
Susy Young  6/4/2024 10:17 AM EDT Back to Top      Pt was notified    NAIMA Moon  6/4/2024  7:27 AM EDT       + HSV 1 (oral herpes) negative HSV 2. If symptoms of sores around the mouth develop, can use antivirals like valtrex prn. Also, when this happens do not share drinks, utensils, kiss etc to prevent the spread to others.

## 2024-06-05 LAB — MISCELLANEOUS LAB TEST RESULT: NORMAL

## 2024-06-10 LAB — MISCELLANEOUS LAB TEST RESULT: NORMAL

## 2024-06-10 NOTE — RESULT ENCOUNTER NOTE
Testosterone placed on hold and repeat blood work ordered.  Urology not previously ordering testosterone replacement.  He has not followed up with urology office in 2 years

## 2024-06-11 ENCOUNTER — TELEPHONE (OUTPATIENT)
Age: 34
End: 2024-06-11

## 2024-06-11 NOTE — TELEPHONE ENCOUNTER
PT called in stating that he got called into work for tomorrow and will need to cancel his PSYCH appt.     Please cancel appt with pt for: 06/12    For any further information or questions, please call pt. Thank you!    Harvey Rabago (Self)   [phone] (Work)

## 2024-06-23 ENCOUNTER — TELEPHONE (OUTPATIENT)
Dept: OTHER | Facility: OTHER | Age: 34
End: 2024-06-23

## 2024-06-23 NOTE — TELEPHONE ENCOUNTER
Patient would like to cancel his upcoming appointment for 06/24 and any other scheduled appointments he has for right now. When he is ready he will call back to reschedule.

## 2024-07-10 ENCOUNTER — TELEPHONE (OUTPATIENT)
Dept: BEHAVIORAL/MENTAL HEALTH CLINIC | Facility: CLINIC | Age: 34
End: 2024-07-10

## 2024-07-16 ENCOUNTER — OFFICE VISIT (OUTPATIENT)
Dept: FAMILY MEDICINE CLINIC | Facility: CLINIC | Age: 34
End: 2024-07-16
Payer: COMMERCIAL

## 2024-07-16 VITALS
BODY MASS INDEX: 33.95 KG/M2 | WEIGHT: 224 LBS | HEIGHT: 68 IN | DIASTOLIC BLOOD PRESSURE: 74 MMHG | SYSTOLIC BLOOD PRESSURE: 130 MMHG | HEART RATE: 90 BPM | TEMPERATURE: 98.3 F | OXYGEN SATURATION: 95 %

## 2024-07-16 DIAGNOSIS — Z23 ENCOUNTER FOR IMMUNIZATION: ICD-10-CM

## 2024-07-16 DIAGNOSIS — M54.50 ACUTE LEFT-SIDED LOW BACK PAIN WITHOUT SCIATICA: Primary | ICD-10-CM

## 2024-07-16 DIAGNOSIS — M65.30 TRIGGER FINGER, UNSPECIFIED FINGER, UNSPECIFIED LATERALITY: ICD-10-CM

## 2024-07-16 DIAGNOSIS — M45.0 ANKYLOSING SPONDYLITIS OF MULTIPLE SITES IN SPINE (HCC): ICD-10-CM

## 2024-07-16 DIAGNOSIS — G89.29 CHRONIC PELVIC PAIN IN MALE: ICD-10-CM

## 2024-07-16 DIAGNOSIS — R35.0 URINARY FREQUENCY: ICD-10-CM

## 2024-07-16 DIAGNOSIS — R10.2 CHRONIC PELVIC PAIN IN MALE: ICD-10-CM

## 2024-07-16 PROCEDURE — 99214 OFFICE O/P EST MOD 30 MIN: CPT | Performed by: NURSE PRACTITIONER

## 2024-07-16 RX ORDER — PREDNISONE 10 MG/1
TABLET ORAL
Qty: 18 TABLET | Refills: 0 | Status: SHIPPED | OUTPATIENT
Start: 2024-07-16 | End: 2024-07-25

## 2024-07-16 RX ORDER — TAMSULOSIN HYDROCHLORIDE 0.4 MG/1
0.4 CAPSULE ORAL
Qty: 30 CAPSULE | Refills: 3 | Status: SHIPPED | OUTPATIENT
Start: 2024-07-16

## 2024-07-16 RX ORDER — ESCITALOPRAM OXALATE 5 MG/1
TABLET ORAL
COMMUNITY
Start: 2024-06-26

## 2024-07-16 NOTE — PROGRESS NOTES
Ambulatory Visit  Name: Felix Rabago      : 1990      MRN: [id number]  Encounter Provider: NAIMA Moon  Encounter Date: 2024   Encounter department: [facility]    Assessment & Plan   1. Acute left-sided low back pain without sciatica  -     predniSONE 10 mg tablet; Take 3 tablets (30 mg total) by mouth daily for 3 days, THEN 2 tablets (20 mg total) daily for 3 days, THEN 1 tablet (10 mg total) daily for 3 days.  -     Ambulatory referral to Spine & Pain Management; Future  2. Encounter for immunization  -     Pneumococcal Conjugate Vaccine 20-valent (Pcv20)  3. Chronic pelvic pain in male  -     tamsulosin (FLOMAX) 0.4 mg; Take 1 capsule (0.4 mg total) by mouth daily with dinner  4. Urinary frequency  -     tamsulosin (FLOMAX) 0.4 mg; Take 1 capsule (0.4 mg total) by mouth daily with dinner  5. Ankylosing spondylitis of multiple sites in spine (HCC)  -     Ambulatory referral to Spine & Pain Management; Future  6. Trigger finger, unspecified finger, unspecified laterality  -     Ambulatory Referral to Orthopedic Surgery; Future       History of Present Illness     Patient presents for routine follow-up.  Patient states that his palpitations have improved and still has his reflux but he is taking an over-the-counter medication right now she is unaware of the name to help with indigestion.  His Protonix was not covered with insurance without prior auth which we will start for him.  He states that he is having hesitancy with urination and feels like he has to push to urinate.  He is on Flomax 0.8 mg has not helped with his urinary symptoms but he is now having trouble with ejaculation.  States he is unable to ejaculate.  Cut down on Flomax to 1 daily to see if this helps and if not can stop if urinary symptoms worsen she will let the office know.  Follow-up with urology as instructed number given to call to schedule.  He does have pain in the left lower back.   "States happened on Thursday when he lifted a box. He then twisted and felt a spasm. It has been constant pain since, worse with movement. He tried tylenol and naproxen without relief. He has also used ice and heat. No red flag symptoms. States he has had along term back problem and was seen by a spine doctor previously. He had epidurals in the past.  Would like to re-establish with pain management.   He has been having numbness in the first second and third fingers when holding the wheel and pain in the wrists. Get splints as instructed and do stretches as instructed. He also feel like he has trigger finger in the second and third fingers as they lock on him and he has to use other hand to straighten them out.         Review of Systems   Constitutional:  Negative for chills and fever.   Respiratory:  Negative for cough and shortness of breath.    Cardiovascular:  Negative for chest pain and palpitations.   Gastrointestinal:  Negative for abdominal pain, constipation, diarrhea, nausea and vomiting.   Genitourinary:  Negative for dysuria and hematuria.   Musculoskeletal:  Positive for arthralgias and back pain.   Skin:  Negative for color change and rash.   Neurological:  Negative for seizures and syncope.   Psychiatric/Behavioral:  The patient is nervous/anxious.    All other systems reviewed and are negative.      Objective     /74   Pulse 90   Temp 98.3 °F (36.8 °C) (Tympanic)   Ht 5' 8\" (1.727 m)   Wt 102 kg (224 lb)   SpO2 95%   BMI 34.06 kg/m²     Physical Exam  Vitals and nursing note reviewed.   Constitutional:       General: He is not in acute distress.     Appearance: He is well-developed.   HENT:      Head: Normocephalic and atraumatic.   Cardiovascular:      Rate and Rhythm: Normal rate and regular rhythm.      Pulses: Normal pulses.      Heart sounds: Normal heart sounds. No murmur heard.  Pulmonary:      Effort: Pulmonary effort is normal. No respiratory distress.      Breath sounds: Normal " breath sounds.   Abdominal:      General: Abdomen is flat. Bowel sounds are normal.      Palpations: Abdomen is soft.      Tenderness: There is no abdominal tenderness.   Musculoskeletal:         General: No swelling.      Right wrist: Normal.      Left wrist: Normal.      Right hand: Normal.      Left hand: Normal.      Cervical back: Neck supple.      Thoracic back: Normal.      Lumbar back: Spasms and tenderness present. No lacerations or bony tenderness. Normal range of motion. Negative right straight leg raise test and negative left straight leg raise test.   Skin:     General: Skin is warm and dry.      Capillary Refill: Capillary refill takes less than 2 seconds.   Neurological:      Mental Status: He is alert.   Psychiatric:         Mood and Affect: Mood normal.         Behavior: Behavior normal.         Thought Content: Thought content normal.         Judgment: Judgment normal.       Administrative Statements

## 2024-07-17 ENCOUNTER — TELEPHONE (OUTPATIENT)
Dept: FAMILY MEDICINE CLINIC | Facility: CLINIC | Age: 34
End: 2024-07-17

## 2024-07-17 NOTE — TELEPHONE ENCOUNTER
Patient's  Pantoprazole 40 mg needs prior authorization     ( It is marked not taking due to patient was unable to start because of needing prior auth) PCP would like to attempt prior auth to see if they will cover this medication

## 2024-08-13 ENCOUNTER — DOCUMENTATION (OUTPATIENT)
Dept: OBGYN CLINIC | Facility: CLINIC | Age: 34
End: 2024-08-13

## 2024-08-13 ENCOUNTER — OFFICE VISIT (OUTPATIENT)
Dept: OBGYN CLINIC | Facility: CLINIC | Age: 34
End: 2024-08-13
Payer: COMMERCIAL

## 2024-08-13 VITALS
WEIGHT: 224 LBS | BODY MASS INDEX: 33.95 KG/M2 | HEART RATE: 66 BPM | HEIGHT: 68 IN | DIASTOLIC BLOOD PRESSURE: 81 MMHG | SYSTOLIC BLOOD PRESSURE: 134 MMHG

## 2024-08-13 DIAGNOSIS — M65.321 TRIGGER FINGER, RIGHT INDEX FINGER: ICD-10-CM

## 2024-08-13 DIAGNOSIS — G56.03 CARPAL TUNNEL SYNDROME, BILATERAL: Primary | ICD-10-CM

## 2024-08-13 DIAGNOSIS — M65.311 TRIGGER FINGER OF RIGHT THUMB: ICD-10-CM

## 2024-08-13 DIAGNOSIS — M65.30 TRIGGER FINGER, UNSPECIFIED FINGER, UNSPECIFIED LATERALITY: ICD-10-CM

## 2024-08-13 PROCEDURE — 20550 NJX 1 TENDON SHEATH/LIGAMENT: CPT | Performed by: ORTHOPAEDIC SURGERY

## 2024-08-13 PROCEDURE — 99213 OFFICE O/P EST LOW 20 MIN: CPT | Performed by: ORTHOPAEDIC SURGERY

## 2024-08-13 RX ORDER — BUPIVACAINE HYDROCHLORIDE 2.5 MG/ML
0.5 INJECTION, SOLUTION INFILTRATION; PERINEURAL
Status: COMPLETED | OUTPATIENT
Start: 2024-08-13 | End: 2024-08-13

## 2024-08-13 RX ORDER — BETAMETHASONE SODIUM PHOSPHATE AND BETAMETHASONE ACETATE 3; 3 MG/ML; MG/ML
3 INJECTION, SUSPENSION INTRA-ARTICULAR; INTRALESIONAL; INTRAMUSCULAR; SOFT TISSUE
Status: COMPLETED | OUTPATIENT
Start: 2024-08-13 | End: 2024-08-13

## 2024-08-13 RX ADMIN — BUPIVACAINE HYDROCHLORIDE 0.5 ML: 2.5 INJECTION, SOLUTION INFILTRATION; PERINEURAL at 11:30

## 2024-08-13 RX ADMIN — BETAMETHASONE SODIUM PHOSPHATE AND BETAMETHASONE ACETATE 3 MG: 3; 3 INJECTION, SUSPENSION INTRA-ARTICULAR; INTRALESIONAL; INTRAMUSCULAR; SOFT TISSUE at 11:30

## 2024-08-13 NOTE — PROGRESS NOTES
EMG order emailed to Tess Diane for expidited scheduling. Patient aware this return phone call can take upto 2 weeks and provided my # to contact me once scheduled for a follow-up appointment to be made.

## 2024-08-13 NOTE — PROGRESS NOTES
Assessment/Plan:   Diagnoses and all orders for this visit:    Carpal tunnel syndrome, bilateral  -     EMG 2 Limb Upper Extremity; Future    Trigger finger, unspecified finger, unspecified laterality  -     Ambulatory Referral to Orthopedic Surgery    Trigger finger of right thumb  -     Hand/upper extremity injection: R thumb A1    Trigger finger, right index finger  -     Hand/upper extremity injection       The patient has a history of trigger finger at multiple sites of the bilateral hand. On exam today, the right long and index fingers were most symptomatic. He was offered and accepted an injection(s) of celestone and marcaine to his Right ring long and index finger(s) for symptomatic relief of pain and inflammation. Patient tolerated the treatment(s) well. The patient also reports symptoms consistent with bilateral carpal tunnel syndrome. Bilateral EMGs were ordered for further evaluation. He will be seen for follow-up once the EMG is complete. Patient expresses understanding and is in agreement with this treatment plan. The patient was given the opportunity to ask questions or present concerns.    The patient has triggering along his right second and third fingers as well as evidence of carpal tunnel syndrome bilaterally.  Both trigger fingers were injected with Celestone and Marcaine.  Nerve conduction studies are ordered for his bilateral upper extremities.  Return back once nerve conduction studies are complete.  At that point, if he is having any left-sided issues, injection therapy may occur there      Subjective:   Patient ID: Felix Rabago  1990     HPI  Patient is a 34 y.o. male who presents for initial evaluation of his bilateral hands. The patient reports a history of trigger finger in multiple fingers on both hands. He reports triggering in the right 1-3 fingers and left 1-2 fingers. He states that the fingers trigger danielle daily basis with repetitive activities. He also reports numbness and  tingling in the bilateral finger tips for several years.      The following portions of the patient's history were reviewed and updated as appropriate:  Past medical history, past surgical history, Family history, social history, current medications and allergies    Past Medical History:   Diagnosis Date    Asthma     Depressed     Substance abuse (HCC)        Past Surgical History:   Procedure Laterality Date    APPENDECTOMY      DENTAL SURGERY  03/25/2022       Family History   Problem Relation Age of Onset    No Known Problems Mother     No Known Problems Father        Social History     Socioeconomic History    Marital status: Single     Spouse name: None    Number of children: None    Years of education: None    Highest education level: None   Occupational History    None   Tobacco Use    Smoking status: Former     Current packs/day: 1.00     Average packs/day: 1 pack/day for 10.0 years (10.0 ttl pk-yrs)     Types: Cigarettes    Smokeless tobacco: Never   Vaping Use    Vaping status: Never Used   Substance and Sexual Activity    Alcohol use: Yes     Comment: monthly    Drug use: No    Sexual activity: Yes     Partners: Female   Other Topics Concern    None   Social History Narrative    None     Social Determinants of Health     Financial Resource Strain: Not on file   Food Insecurity: Not on file   Transportation Needs: Not on file   Physical Activity: Not on file   Stress: Not on file   Social Connections: Not on file   Intimate Partner Violence: Not on file   Housing Stability: Not on file         Current Outpatient Medications:     Blood Glucose Monitoring Suppl (OneTouch Verio Reflect) w/Device KIT, Check blood sugars once daily. Please substitute with appropriate alternative as covered by patient's insurance. Dx: E11.65, Disp: 1 kit, Rfl: 0    buprenorphine (SUBUTEX) 2 mg, 2 mg daily, Disp: , Rfl:     buprenorphine (SUBUTEX) 8 mg, 8 mg 2 (two) times a day, Disp: , Rfl:     buPROPion (WELLBUTRIN SR) 150 mg  12 hr tablet, , Disp: , Rfl:     escitalopram (LEXAPRO) 10 mg tablet, Take 10 mg by mouth daily, Disp: , Rfl:     escitalopram (LEXAPRO) 5 mg tablet, take 1 tablet by mouth once daily --TOTAL DAILY DOSE 15 MG., Disp: , Rfl:     gabapentin (NEURONTIN) 800 mg tablet, Take 800 mg by mouth 3 (three) times a day as needed, Disp: , Rfl:     glucose blood (OneTouch Verio) test strip, Check blood sugars once daily. Please substitute with appropriate alternative as covered by patient's insurance. Dx: E11.65, Disp: 100 each, Rfl: 3    methocarbamol (ROBAXIN) 500 mg tablet, Take 1 tablet (500 mg total) by mouth 3 (three) times a day as needed for muscle spasms (muscle tension), Disp: 60 tablet, Rfl: 0    OneTouch Delica Lancets 33G MISC, Check blood sugars once daily. Please substitute with appropriate alternative as covered by patient's insurance. Dx: E11.65, Disp: 100 each, Rfl: 3    tamsulosin (FLOMAX) 0.4 mg, Take 1 capsule (0.4 mg total) by mouth daily with dinner, Disp: 30 capsule, Rfl: 3    Fluticasone-Salmeterol (Advair Diskus) 250-50 mcg/dose inhaler, Inhale 1 puff 2 (two) times a day Rinse mouth after use., Disp: 60 blister, Rfl: 0    hydrOXYzine HCL (ATARAX) 25 mg tablet, Take 25 mg by mouth 3 (three) times a day as needed for anxiety (Patient not taking: Reported on 5/30/2024), Disp: , Rfl:     pantoprazole (PROTONIX) 40 mg tablet, Take 1 tablet (40 mg total) by mouth daily (Patient not taking: Reported on 7/16/2024), Disp: 60 tablet, Rfl: 1    No Known Allergies    Review of Systems   Constitutional:  Negative for chills and fever.   HENT:  Negative for ear pain and sore throat.    Eyes:  Negative for pain and visual disturbance.   Respiratory:  Negative for cough and shortness of breath.    Cardiovascular:  Negative for chest pain and palpitations.   Gastrointestinal:  Negative for abdominal pain and vomiting.   Genitourinary:  Negative for dysuria and hematuria.   Musculoskeletal:  Negative for arthralgias and  "back pain.   Skin:  Negative for color change and rash.   Neurological:  Negative for seizures and syncope.   All other systems reviewed and are negative.       Objective:  /81 (BP Location: Left arm, Patient Position: Sitting, Cuff Size: Large)   Pulse 66   Ht 5' 8\" (1.727 m)   Wt 102 kg (224 lb) Comment: reported  BMI 34.06 kg/m²     Ortho Exam  right Hand -    Patient presents with no obvious anatomical deformity  Skin is warm and dry to touch with no signs of erythema, ecchymosis, or infection  No soft tissue swelling or effusion noted  Full FDS, FDP, extensor mechanisms are intact  No rotational deformity with composite finger flexion  TTP with palpable nodule in the thumb, index finger flexor tendon local to A1 Pulley  Reproducible triggering on exam  Demonstrates normal wrist, elbow, and shoulder motion  Forearm compartments are soft and supple  2+ distal radial pulse with brisk capillary refill to the fingers  Radial, median, and ulnar motor and sensory distribution intact  Sensations light to touch intact distally      Physical Exam  HENT:      Head: Normocephalic and atraumatic.      Nose: Nose normal.   Eyes:      Conjunctiva/sclera: Conjunctivae normal.   Cardiovascular:      Rate and Rhythm: Normal rate.   Pulmonary:      Effort: Pulmonary effort is normal.   Musculoskeletal:      Cervical back: Neck supple.   Skin:     General: Skin is warm and dry.      Capillary Refill: Capillary refill takes less than 2 seconds.   Neurological:      Mental Status: He is alert and oriented to person, place, and time.   Psychiatric:         Mood and Affect: Mood normal.         Behavior: Behavior normal.          Diagnostic Test Review:  No new imaging at time of visit.     Hand/upper extremity injection: R thumb A1  Universal Protocol:  procedure performed by consultantConsent: Verbal consent obtained.  Risks and benefits: risks, benefits and alternatives were discussed  Consent given by: patient  Timeout " called at: 8/13/2024 11:57 AM.  Patient understanding: patient states understanding of the procedure being performed  Patient consent: the patient's understanding of the procedure matches consent given  Site marked: the operative site was marked  Radiology Images displayed and confirmed. If images not available, report reviewed: imaging studies available  Patient identity confirmed: verbally with patient  Supporting Documentation  Indications: joint swelling and pain   Procedure Details  Condition:trigger finger Location: thumb - R thumb A1   Preparation: Patient was prepped and draped in the usual sterile fashion  Needle size: 25 G  Ultrasound guidance: no  Approach: volar  Medications administered: 0.5 mL bupivacaine 0.25 %; 3 mg betamethasone acetate-betamethasone sodium phosphate 6 (3-3) mg/mL  Patient tolerance: patient tolerated the procedure well with no immediate complications  Dressing:  Sterile dressing applied       Hand/upper extremity injection  Universal Protocol:  procedure performed by consultantConsent: Verbal consent obtained.  Risks and benefits: risks, benefits and alternatives were discussed  Consent given by: patient  Timeout called at: 8/13/2024 11:58 AM.  Patient understanding: patient states understanding of the procedure being performed  Patient consent: the patient's understanding of the procedure matches consent given  Site marked: the operative site was marked  Radiology Images displayed and confirmed. If images not available, report reviewed: imaging studies available  Patient identity confirmed: verbally with patient  Supporting Documentation  Indications: joint swelling and pain   Procedure Details  Condition:trigger finger Location: index finger -   Preparation: Patient was prepped and draped in the usual sterile fashion  Needle size: 25 G  Ultrasound guidance: no  Approach: volar  Medications administered: 0.5 mL bupivacaine 0.25 %; 3 mg betamethasone acetate-betamethasone sodium  phosphate 6 (3-3) mg/mL  Patient tolerance: patient tolerated the procedure well with no immediate complications  Dressing:  Sterile dressing applied              Scribe Attestation      I,:  Faustino Jackson am acting as a scribe while in the presence of the attending physician.:       I,:  Matt Campbell DO personally performed the services described in this documentation    as scribed in my presence.:

## 2024-08-19 ENCOUNTER — TELEPHONE (OUTPATIENT)
Dept: OBGYN CLINIC | Facility: CLINIC | Age: 34
End: 2024-08-19

## 2024-08-19 DIAGNOSIS — M25.50 ARTHRALGIA, UNSPECIFIED JOINT: Primary | ICD-10-CM

## 2024-08-19 NOTE — TELEPHONE ENCOUNTER
Patient scheduled for EMG 2/25/25; She has been placed on a wait list and provided the Central Scheduling # [phone] to check other locations availability. I also instructed her to contact our office back to schedule a follow up.

## 2024-08-27 DIAGNOSIS — G89.29 CHRONIC MIDLINE LOW BACK PAIN WITHOUT SCIATICA: Primary | ICD-10-CM

## 2024-08-27 DIAGNOSIS — M54.50 CHRONIC MIDLINE LOW BACK PAIN WITHOUT SCIATICA: Primary | ICD-10-CM

## 2024-08-27 DIAGNOSIS — M45.0 ANKYLOSING SPONDYLITIS OF MULTIPLE SITES IN SPINE (HCC): ICD-10-CM

## 2024-08-27 RX ORDER — PREDNISONE 10 MG/1
TABLET ORAL
Qty: 18 TABLET | Refills: 0 | Status: SHIPPED | OUTPATIENT
Start: 2024-08-27 | End: 2024-09-05

## 2024-09-17 NOTE — TELEPHONE ENCOUNTER
Patient called back asking if he should have any weight restrictions with his condition   Thank you      [phone] No

## 2024-09-18 ENCOUNTER — OFFICE VISIT (OUTPATIENT)
Dept: PAIN MEDICINE | Facility: CLINIC | Age: 34
End: 2024-09-18
Payer: COMMERCIAL

## 2024-09-18 VITALS
BODY MASS INDEX: 34.1 KG/M2 | DIASTOLIC BLOOD PRESSURE: 84 MMHG | HEIGHT: 68 IN | HEART RATE: 89 BPM | SYSTOLIC BLOOD PRESSURE: 148 MMHG | WEIGHT: 225 LBS

## 2024-09-18 DIAGNOSIS — M54.50 CHRONIC MIDLINE LOW BACK PAIN WITHOUT SCIATICA: ICD-10-CM

## 2024-09-18 DIAGNOSIS — M54.2 NECK PAIN: Primary | ICD-10-CM

## 2024-09-18 DIAGNOSIS — M45.0 ANKYLOSING SPONDYLITIS OF MULTIPLE SITES IN SPINE (HCC): ICD-10-CM

## 2024-09-18 DIAGNOSIS — G89.29 CHRONIC MIDLINE LOW BACK PAIN WITHOUT SCIATICA: ICD-10-CM

## 2024-09-18 PROCEDURE — 99244 OFF/OP CNSLTJ NEW/EST MOD 40: CPT | Performed by: STUDENT IN AN ORGANIZED HEALTH CARE EDUCATION/TRAINING PROGRAM

## 2024-09-18 NOTE — PROGRESS NOTES
"Assessment:  1. Chronic midline low back pain without sciatica    2. Ankylosing spondylitis of multiple sites in spine (HCC)        Plan:  ***    {Oral Swab Statement:27650}    {Pain Management Procedure Statement:14569}    {Opioid Statement:18037}    {UDS Statement:32013}    {PDMP Statement:68901}      History of Present Illness:  The patient is a 34 y.o. male who presents for a follow up office visit in regards to Neck Pain and Back Pain (Lower back).   The patient’s current symptoms include ***    Current pain medications includes:  .  The patient reports that this regimen is providing ***% pain relief.  The patient is reporting {Side Effects:51544::\"no side effects\"} from this pain medication regimen.    I have personally reviewed and/or updated the patient's past medical history, past surgical history, family history, social history, current medications, allergies, and vital signs today.         Review of Systems  Review of Systems        Past Medical History:   Diagnosis Date    Asthma     Depressed     Substance abuse ([facility])        Past Surgical History:   Procedure Laterality Date    APPENDECTOMY      DENTAL SURGERY  03/25/2022       Family History   Problem Relation Age of Onset    No Known Problems Mother     No Known Problems Father        Social History     Occupational History    Not on file   Tobacco Use    Smoking status: Former     Current packs/day: 1.00     Average packs/day: 1 pack/day for 10.0 years (10.0 ttl pk-yrs)     Types: Cigarettes    Smokeless tobacco: Never   Vaping Use    Vaping status: Never Used   Substance and Sexual Activity    Alcohol use: Yes     Comment: monthly    Drug use: No    Sexual activity: Yes     Partners: Female         Current Outpatient Medications:     Blood Glucose Monitoring Suppl (OneTouch Verio Reflect) w/Device KIT, Check blood sugars once daily. Please substitute with appropriate alternative as covered by patient's insurance. Dx: E11.65, Disp: 1 kit, Rfl: 0    " "buprenorphine (SUBUTEX) 2 mg, 2 mg daily, Disp: , Rfl:     buprenorphine (SUBUTEX) 8 mg, 8 mg 2 (two) times a day, Disp: , Rfl:     buPROPion (WELLBUTRIN SR) 150 mg 12 hr tablet, , Disp: , Rfl:     escitalopram (LEXAPRO) 10 mg tablet, Take 10 mg by mouth daily, Disp: , Rfl:     escitalopram (LEXAPRO) 5 mg tablet, take 1 tablet by mouth once daily --TOTAL DAILY DOSE 15 MG., Disp: , Rfl:     gabapentin (NEURONTIN) 800 mg tablet, Take 800 mg by mouth 3 (three) times a day as needed, Disp: , Rfl:     glucose blood (OneTouch Verio) test strip, Check blood sugars once daily. Please substitute with appropriate alternative as covered by patient's insurance. Dx: E11.65, Disp: 100 each, Rfl: 3    methocarbamol (ROBAXIN) 500 mg tablet, Take 1 tablet (500 mg total) by mouth 3 (three) times a day as needed for muscle spasms (muscle tension), Disp: 60 tablet, Rfl: 0    OneTouch Delica Lancets 33G MISC, Check blood sugars once daily. Please substitute with appropriate alternative as covered by patient's insurance. Dx: E11.65, Disp: 100 each, Rfl: 3    tamsulosin (FLOMAX) 0.4 mg, Take 1 capsule (0.4 mg total) by mouth daily with dinner, Disp: 30 capsule, Rfl: 3    Fluticasone-Salmeterol (Advair Diskus) 250-50 mcg/dose inhaler, Inhale 1 puff 2 (two) times a day Rinse mouth after use., Disp: 60 blister, Rfl: 0    hydrOXYzine HCL (ATARAX) 25 mg tablet, Take 25 mg by mouth 3 (three) times a day as needed for anxiety (Patient not taking: Reported on 5/30/2024), Disp: , Rfl:     pantoprazole (PROTONIX) 40 mg tablet, Take 1 tablet (40 mg total) by mouth daily (Patient not taking: Reported on 7/16/2024), Disp: 60 tablet, Rfl: 1    No Known Allergies    Physical Exam:    /84   Pulse 89   Ht 5' 8\" (1.727 m)   Wt 102 kg (225 lb)   BMI 34.21 kg/m²     Constitutional:{General Appearance:73638::\"normal, well developed, well nourished, alert, in no distress and non-toxic and no overt pain " "behavior.\"}  Eyes:{Sclera:71665::\"anicteric\"}  HEENT:{[place]ear[place]::\"grossly intact\"}  Neck:{Neck:82947::\"supple, symmetric, trachea midline and no masses \"}  Pulmonary:{Respiratory effort:80764::\"even and unlabored\"}  Cardiovascular:{Examination of Extremities:51987::\"No edema or pitting edema present\"}  Skin:{Skin and Subcutaneous tissues:62311::\"Normal without rashes or lesions and well hydrated\"}  Psychiatric:{Mood and Affect:77981::\"Mood and affect appropriate\"}  Neurologic:{Cranial Nerves:18114::\"Cranial Nerves II-XII grossly intact\"}  Musculoskeletal:{[place]:66874::\"normal\"}    Imaging  No orders to display       MRI LUMBAR SPINE WITHOUT CONTRAST     INDICATION: M51.16: Intervertebral disc disorders with radiculopathy, lumbar region.   Low back pain radiating to both thighs.  Weakness.     COMPARISON:  None.     TECHNIQUE:  Sagittal T1, sagittal T2, sagittal inversion recovery, axial T1 and axial T2, coronal T2.    IMAGE QUALITY:  Diagnostic     FINDINGS:     VERTEBRAL BODIES:  There are 5 lumbar type vertebral bodies.  Normal alignment of the lumbar spine.  No spondylolysis or spondylolisthesis. No scoliosis.  No compression fracture.    Normal marrow signal is identified within the visualized bony   structures.  No discrete marrow lesion.     SACRUM:  Normal signal within the sacrum. No evidence of insufficiency or stress fracture.     DISTAL CORD AND CONUS:  Normal size and signal within the distal cord and conus.     PARASPINAL SOFT TISSUES:  Paraspinal soft tissues are unremarkable.     LOWER THORACIC DISC SPACES:  Normal disc height and signal.  No disc herniation, canal stenosis or foraminal narrowing.     LUMBAR DISC SPACES:     L1-L2:  Normal.     L2-L3:  No significant central or foraminal narrowing.     L3-L4:  Mild annular bulge and mild facet hypertrophy noted without significant central or foraminal stenosis.     L4-L5:  Left paramedian/ foraminal protrusion type disc herniation " results in mild left foraminal narrowing.  Central canal patent.     L5-S1:  Mild facet hypertrophy.  No significant central or foraminal narrowing.  Tiny right paramedian protrusion type disc herniation is noted.     IMPRESSION:     Mild left foraminal narrowing at L4-5 related to paramedian protrusion type disc herniation.     No orders of the defined types were placed in this encounter.

## 2024-09-18 NOTE — LETTER
"September 19, 2024     Siomara Crook, NAIMA  [address] [facility].  [facility] [address]    Patient: Felix Rabago   YOB: 1990   Date of Visit: 9/18/2024       Dear Dr. Crook:    Thank you for referring Felix Rabago to me for evaluation. Below are my notes for this consultation.    If you have questions, please do not hesitate to call me. I look forward to following your patient along with you.         Sincerely,        Yael Vogt MD        CC: No Recipients    Yael Vogt MD  9/18/2024  2:05 PM  Signed  Assessment:  1. Neck pain    2. Chronic midline low back pain without sciatica    3. Ankylosing spondylitis of multiple sites in spine (HCC)        Portions of the record may have been created with voice recognition software. Occasional wrong word or \"sound a like\" substitutions may have occurred due to the inherent limitations of voice recognition software. Read the chart carefully and recognize, using context, where substitutions have occurred. Contact me with any questions.         Plan:  34-year-old male with history of ankylosing spondylitis, referred by PCP, here for evaluation of chronic pain symptoms including neck and low back pain.  Patient notes neck pain is his most significant concern at this time.  Symptoms are axial, without radicular pain, paresthesias, weakness, balance or gait issues.  No relevant cervical spine imaging available for review.  He was previously following with Dr. Enamorado for low back pain symptoms and has undergone several interventions including TITO without relief.  He is also tried and failed medication management with several agents including duloxetine, NSAIDs, muscle relaxers.  He is currently on buprenorphine 8 mg bid per PCP.  Neck pain symptoms likely in the setting of myofascial pain, DDD, underlying ankylosing spondylitis.    Obtain cervical spine x-ray for further evaluation.    Discussed option of trigger point injections for symptom " management.    Recommend course of physical therapy for neck strengthening exercises, optimizing pain-free ROM.    Follow-up in 1 month after injection.      History of Present Illness:  The patient is a 34 y.o. male who presents for a follow up office visit in regards to Neck Pain and Back Pain (Lower back).       I have personally reviewed and/or updated the patient's past medical history, past surgical history, family history, social history, current medications, allergies, and vital signs today.       Review of Systems  Review of Systems   Constitutional:  Negative for fever.   HENT:  Negative for sinus pain.    Eyes:  Negative for redness.   Respiratory:  Negative for shortness of breath.    Cardiovascular:  Negative for palpitations.   Gastrointestinal:  Negative for abdominal pain and nausea.   Endocrine: Negative for polydipsia.   Genitourinary:  Negative for difficulty urinating.   Musculoskeletal:  Positive for back pain, neck pain and neck stiffness. Negative for gait problem and myalgias.   Skin:  Negative for rash.   Neurological:  Negative for seizures and headaches.   Psychiatric/Behavioral:  Negative for decreased concentration. The patient is not nervous/anxious.            Past Medical History:   Diagnosis Date   • Asthma    • Depressed    • Substance abuse (HCC)        Past Surgical History:   Procedure Laterality Date   • APPENDECTOMY     • DENTAL SURGERY  03/25/2022       Family History   Problem Relation Age of Onset   • No Known Problems Mother    • No Known Problems Father        Social History     Occupational History   • Not on file   Tobacco Use   • Smoking status: Former     Current packs/day: 1.00     Average packs/day: 1 pack/day for 10.0 years (10.0 ttl pk-yrs)     Types: Cigarettes   • Smokeless tobacco: Never   Vaping Use   • Vaping status: Never Used   Substance and Sexual Activity   • Alcohol use: Not Currently   • Drug use: No   • Sexual activity: Yes     Partners: Female          Current Outpatient Medications:   •  Blood Glucose Monitoring Suppl (OneTouch Verio Reflect) w/Device KIT, Check blood sugars once daily. Please substitute with appropriate alternative as covered by patient's insurance. Dx: E11.65, Disp: 1 kit, Rfl: 0  •  buprenorphine (SUBUTEX) 2 mg, 2 mg daily, Disp: , Rfl:   •  buprenorphine (SUBUTEX) 8 mg, 8 mg 2 (two) times a day, Disp: , Rfl:   •  buPROPion (WELLBUTRIN SR) 150 mg 12 hr tablet, , Disp: , Rfl:   •  escitalopram (LEXAPRO) 10 mg tablet, Take 10 mg by mouth daily, Disp: , Rfl:   •  escitalopram (LEXAPRO) 5 mg tablet, take 1 tablet by mouth once daily --TOTAL DAILY DOSE 15 MG., Disp: , Rfl:   •  gabapentin (NEURONTIN) 800 mg tablet, Take 800 mg by mouth 3 (three) times a day as needed, Disp: , Rfl:   •  glucose blood (OneTouch Verio) test strip, Check blood sugars once daily. Please substitute with appropriate alternative as covered by patient's insurance. Dx: E11.65, Disp: 100 each, Rfl: 3  •  methocarbamol (ROBAXIN) 500 mg tablet, Take 1 tablet (500 mg total) by mouth 3 (three) times a day as needed for muscle spasms (muscle tension), Disp: 60 tablet, Rfl: 0  •  OneTouch Delica Lancets 33G MISC, Check blood sugars once daily. Please substitute with appropriate alternative as covered by patient's insurance. Dx: E11.65, Disp: 100 each, Rfl: 3  •  tamsulosin (FLOMAX) 0.4 mg, Take 1 capsule (0.4 mg total) by mouth daily with dinner, Disp: 30 capsule, Rfl: 3  •  Fluticasone-Salmeterol (Advair Diskus) 250-50 mcg/dose inhaler, Inhale 1 puff 2 (two) times a day Rinse mouth after use., Disp: 60 blister, Rfl: 0  •  hydrOXYzine HCL (ATARAX) 25 mg tablet, Take 25 mg by mouth 3 (three) times a day as needed for anxiety (Patient not taking: Reported on 5/30/2024), Disp: , Rfl:   •  pantoprazole (PROTONIX) 40 mg tablet, Take 1 tablet (40 mg total) by mouth daily (Patient not taking: Reported on 7/16/2024), Disp: 60 tablet, Rfl: 1    No Known Allergies    Physical  "Exam:    /84   Pulse 89   Ht 5' 8\" (1.727 m)   Wt 102 kg (225 lb)   BMI 34.21 kg/m²     Constitutional:normal, well developed, well nourished, alert, in no distress and non-toxic and no overt pain behavior.  Eyes:anicteric  HEENT:grossly intact  Neck:supple, symmetric, trachea midline and no masses   Pulmonary:even and unlabored  Cardiovascular:No edema or pitting edema present  Skin:Normal without rashes or lesions and well hydrated  Psychiatric:Mood and affect appropriate  Neurologic:Cranial Nerves II-XII grossly intact  Musculoskeletal:normal gait, limited neck ROM, some discomfort to palpation over b/l upper traps, grossly intact strength and sensation to light touch over BUE, negative ellison's    Imaging  XR spine cervical complete 6+ vw flex/ext/obl    (Results Pending)     No cervical spine imaging available for review.      Orders Placed This Encounter   Procedures   • XR spine cervical complete 6+ vw flex/ext/obl   • Ambulatory referral to Physical Therapy     "

## 2024-09-18 NOTE — PROGRESS NOTES
"Assessment:  1. Neck pain    2. Chronic midline low back pain without sciatica    3. Ankylosing spondylitis of multiple sites in spine (HCC)        Portions of the record may have been created with voice recognition software. Occasional wrong word or \"sound a like\" substitutions may have occurred due to the inherent limitations of voice recognition software. Read the chart carefully and recognize, using context, where substitutions have occurred. Contact me with any questions.         Plan:  34-year-old male with history of ankylosing spondylitis, referred by PCP, here for evaluation of chronic pain symptoms including neck and low back pain.  Patient notes neck pain is his most significant concern at this time.  Symptoms are axial, without radicular pain, paresthesias, weakness, balance or gait issues.  No relevant cervical spine imaging available for review.  He was previously following with Dr. Enamorado for low back pain symptoms and has undergone several interventions including TITO without relief.  He is also tried and failed medication management with several agents including duloxetine, NSAIDs, muscle relaxers.  He is currently on buprenorphine 8 mg bid per PCP.  Neck pain symptoms likely in the setting of myofascial pain, DDD, underlying ankylosing spondylitis.    Obtain cervical spine x-ray for further evaluation.    Discussed option of trigger point injections for symptom management.    Recommend course of physical therapy for neck strengthening exercises, optimizing pain-free ROM.    Follow-up in 1 month after injection.      History of Present Illness:  The patient is a 34 y.o. male who presents for a follow up office visit in regards to Neck Pain and Back Pain (Lower back).       I have personally reviewed and/or updated the patient's past medical history, past surgical history, family history, social history, current medications, allergies, and vital signs today.       Review of Systems  Review of Systems "   Constitutional:  Negative for fever.   HENT:  Negative for sinus pain.    Eyes:  Negative for redness.   Respiratory:  Negative for shortness of breath.    Cardiovascular:  Negative for palpitations.   Gastrointestinal:  Negative for abdominal pain and nausea.   Endocrine: Negative for polydipsia.   Genitourinary:  Negative for difficulty urinating.   Musculoskeletal:  Positive for back pain, neck pain and neck stiffness. Negative for gait problem and myalgias.   Skin:  Negative for rash.   Neurological:  Negative for seizures and headaches.   Psychiatric/Behavioral:  Negative for decreased concentration. The patient is not nervous/anxious.            Past Medical History:   Diagnosis Date    Asthma     Depressed     Substance abuse (HCC)        Past Surgical History:   Procedure Laterality Date    APPENDECTOMY      DENTAL SURGERY  03/25/2022       Family History   Problem Relation Age of Onset    No Known Problems Mother     No Known Problems Father        Social History     Occupational History    Not on file   Tobacco Use    Smoking status: Former     Current packs/day: 1.00     Average packs/day: 1 pack/day for 10.0 years (10.0 ttl pk-yrs)     Types: Cigarettes    Smokeless tobacco: Never   Vaping Use    Vaping status: Never Used   Substance and Sexual Activity    Alcohol use: Not Currently    Drug use: No    Sexual activity: Yes     Partners: Female         Current Outpatient Medications:     Blood Glucose Monitoring Suppl (OneTouch Verio Reflect) w/Device KIT, Check blood sugars once daily. Please substitute with appropriate alternative as covered by patient's insurance. Dx: E11.65, Disp: 1 kit, Rfl: 0    buprenorphine (SUBUTEX) 2 mg, 2 mg daily, Disp: , Rfl:     buprenorphine (SUBUTEX) 8 mg, 8 mg 2 (two) times a day, Disp: , Rfl:     buPROPion (WELLBUTRIN SR) 150 mg 12 hr tablet, , Disp: , Rfl:     escitalopram (LEXAPRO) 10 mg tablet, Take 10 mg by mouth daily, Disp: , Rfl:     escitalopram (LEXAPRO) 5 mg  "tablet, take 1 tablet by mouth once daily --TOTAL DAILY DOSE 15 MG., Disp: , Rfl:     gabapentin (NEURONTIN) 800 mg tablet, Take 800 mg by mouth 3 (three) times a day as needed, Disp: , Rfl:     glucose blood (OneTouch Verio) test strip, Check blood sugars once daily. Please substitute with appropriate alternative as covered by patient's insurance. Dx: E11.65, Disp: 100 each, Rfl: 3    methocarbamol (ROBAXIN) 500 mg tablet, Take 1 tablet (500 mg total) by mouth 3 (three) times a day as needed for muscle spasms (muscle tension), Disp: 60 tablet, Rfl: 0    OneTouch Delica Lancets 33G MISC, Check blood sugars once daily. Please substitute with appropriate alternative as covered by patient's insurance. Dx: E11.65, Disp: 100 each, Rfl: 3    tamsulosin (FLOMAX) 0.4 mg, Take 1 capsule (0.4 mg total) by mouth daily with dinner, Disp: 30 capsule, Rfl: 3    Fluticasone-Salmeterol (Advair Diskus) 250-50 mcg/dose inhaler, Inhale 1 puff 2 (two) times a day Rinse mouth after use., Disp: 60 blister, Rfl: 0    hydrOXYzine HCL (ATARAX) 25 mg tablet, Take 25 mg by mouth 3 (three) times a day as needed for anxiety (Patient not taking: Reported on 5/30/2024), Disp: , Rfl:     pantoprazole (PROTONIX) 40 mg tablet, Take 1 tablet (40 mg total) by mouth daily (Patient not taking: Reported on 7/16/2024), Disp: 60 tablet, Rfl: 1    No Known Allergies    Physical Exam:    /84   Pulse 89   Ht 5' 8\" (1.727 m)   Wt 102 kg (225 lb)   BMI 34.21 kg/m²     Constitutional:normal, well developed, well nourished, alert, in no distress and non-toxic and no overt pain behavior.  Eyes:anicteric  HEENT:grossly intact  Neck:supple, symmetric, trachea midline and no masses   Pulmonary:even and unlabored  Cardiovascular:No edema or pitting edema present  Skin:Normal without rashes or lesions and well hydrated  Psychiatric:Mood and affect appropriate  Neurologic:Cranial Nerves II-XII grossly intact  Musculoskeletal:normal gait, limited neck ROM, some " discomfort to palpation over b/l upper traps, grossly intact strength and sensation to light touch over BUE, negative ellison's    Imaging  XR spine cervical complete 6+ vw flex/ext/obl    (Results Pending)     No cervical spine imaging available for review.      Orders Placed This Encounter   Procedures    XR spine cervical complete 6+ vw flex/ext/obl    Ambulatory referral to Physical Therapy

## 2024-09-25 DIAGNOSIS — R35.0 URINARY FREQUENCY: Primary | ICD-10-CM

## 2024-09-27 ENCOUNTER — TELEPHONE (OUTPATIENT)
Age: 34
End: 2024-09-27

## 2024-09-27 NOTE — TELEPHONE ENCOUNTER
Called established pt due to referral in chart from PCP and pt is schedule for Urinary frequency 11/6/24

## 2024-10-02 ENCOUNTER — APPOINTMENT (OUTPATIENT)
Dept: RADIOLOGY | Facility: CLINIC | Age: 34
End: 2024-10-02
Payer: COMMERCIAL

## 2024-10-02 ENCOUNTER — EVALUATION (OUTPATIENT)
Dept: PHYSICAL THERAPY | Facility: CLINIC | Age: 34
End: 2024-10-02
Payer: COMMERCIAL

## 2024-10-02 DIAGNOSIS — M54.2 NECK PAIN: Primary | ICD-10-CM

## 2024-10-02 DIAGNOSIS — M54.2 NECK PAIN: ICD-10-CM

## 2024-10-02 PROCEDURE — 97161 PT EVAL LOW COMPLEX 20 MIN: CPT | Performed by: PHYSICAL MEDICINE & REHABILITATION

## 2024-10-02 PROCEDURE — 72052 X-RAY EXAM NECK SPINE 6/>VWS: CPT

## 2024-10-02 PROCEDURE — 97110 THERAPEUTIC EXERCISES: CPT | Performed by: PHYSICAL MEDICINE & REHABILITATION

## 2024-10-02 NOTE — PROGRESS NOTES
PT Evaluation     Today's date: 10/2/2024  Patient name: Felix Rabago  : 1990  MRN: [id number]  Referring provider: Yael Vogt MD  Dx:   Encounter Diagnosis     ICD-10-CM    1. Neck pain  M54.2 Ambulatory referral to Physical Therapy                     Assessment  Impairments: abnormal muscle firing, abnormal muscle tone, abnormal or restricted ROM, abnormal movement, activity intolerance, lacks appropriate home exercise program, pain with function, poor posture , poor body mechanics, participation limitations, activity limitations and endurance  Symptom irritability: moderate    Assessment details: Felix Rabago is a 34 y.o. male who was referred to physical therapy for management of chronic neck pain secondary to referring dx. Also has ankylosing spondylitis and chronic LBP.  Primary impairments include constant neck pain and stiffness locally, decreased c-spine AROM with pain and tightness, poor postural awareness with FHRS posture, tension HA, increased mm tension/tone/tightness B c-spine/Uts/shoulders, decreased DNF endurance, and suboccipital tension/tightness B.  Consequently, patient has difficulty completing ADLs including lifting, reaching, turning head to drive, working, etc.  Felix would benefit from skilled intervention to address all deficits and improve functional capability.  Patient is a good candidate for therapy, pending compliance with HEP and consistent participation in physical therapy.  Thank you for the referral and please do not hesitate to contact me with any questions or concerns regarding Felix's care!      Plan  Frequency:1-2x/week   Duration in weeks: 6-8 weeks   POC start date: 10/2/2024  POC end date: 24  Therapeutic exercise/activity, neuromuscular reeducation, manual therapy, and modalities.   Patient understands and agrees to plan of care.    Goals  Short Term--4 weeks  1. Patient will demonstrate 2 point decrease in pain levels.  2. Patient will  "demonstrate 1/2 point increase in all deficient MMT scores.  3. Pt will demonstrate increased neck lateral flexion and rotation R/L by at least 5-10* to improve ability to turn head at work and when driving/riding.   4. Pt will report at least 50% reduction in HA frequency since SOC.     Long Term--By Discharge  1. Patient will achieve expected FOTO score.  2. Pt will be I with HEP.   3. Pt will demonstrate WFL AROM c-spine without increased pain/sx.    4. Pt will I be able to manage neck pain and tension HA.     Patient's Goal: decrease pain, decrease stiffness               Understanding of Dx/Px/POC: good     Prognosis: fair  Prognosis details: Chronic neck/LBP    Plan  Patient would benefit from: skilled physical therapy  Planned modality interventions: cryotherapy and thermotherapy: hydrocollator packs    Planned therapy interventions: manual therapy, joint mobilization, motor coordination training, neuromuscular re-education, patient education, postural training, self care, stretching, therapeutic activities, strengthening, therapeutic exercise, home exercise program, graded exercise, behavior modification, body mechanics training, functional ROM exercises and flexibility    Frequency: 2x week  Treatment plan discussed with: patient  Plan details: Modalities PRN to address pain/sx and improve mobility, movement, and function       Subjective Evaluation    History of Present Illness  Mechanism of injury: Per recent MD visit: \"Plan:  34-year-old male with history of ankylosing spondylitis, referred by PCP, here for evaluation of chronic pain symptoms including neck and low back pain.  Patient notes neck pain is his most significant concern at this time.  Symptoms are axial, without radicular pain, paresthesias, weakness, balance or gait issues.  No relevant cervical spine imaging available for review.  He was previously following with Dr. Enamorado for low back pain symptoms and has undergone several interventions " "including TITO without relief.  He is also tried and failed medication management with several agents including duloxetine, NSAIDs, muscle relaxers.  He is currently on buprenorphine 8 mg bid per PCP.  Neck pain symptoms likely in the setting of myofascial pain, DDD, underlying ankylosing spondylitis.     Obtain cervical spine x-ray for further evaluation.     Discussed option of trigger point injections for symptom management.     Recommend course of physical therapy for neck strengthening exercises, optimizing pain-free ROM.     Follow-up in 1 month after injection.\"     PT IE:   Pt notes many year hx of neck and back pain. No relief with medications trialed to date. Hx of L/S injections but no neck injections to this point; injections c-spine upcoming per pt with PM. No other tx to date for his neck other than medications.   Pt notes the neck pain is always there but gets worse at times. Pain is 1* in central c-spine and proximal mms R/L c-spine; no sx to the shoulders or down the upper back. No sx into Ues Constant dull pain c-spine; does not go away. Pain is worse with turning the head a times; other times turning does not hurt. 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 Notes increased neck pain with sitting long periods using phone, computer, driving, etc. Had episode last month after lifting a box at home; elevated pain in neck and back for 2 weeks; unable to walk/move for 2 weeks per pt. Pain with end range neck rotation R/L. Tightness with neck ROM. Constant mm tightness/tension B c-spine/UTs. Gets HA often with the neck pain; HA across front of head vs R side/L side; takes Aspirin without relief of Ha/neck pain. Pain is better with ice > heat. Pain is better with laying with head/neck supported. Taking gabapentin for anxiety ; has not helped the neck. No relief with medications to date. Notes he has constant n/t in both hands for the past year; " gradually worsening; worse with gripping, riding motor cycle etc; getting EMG today B [facility].  Occasional weakness in his arms; off/on; not worsening/progressing or constant; occasionally drops things. No other n/t. No LE weakness. Occasional imbalance; not worsening;no gait change. No dizziness, syncope, difficulty breathing/swallowing, vision change, speech difficulty, etc; no other sx. No recent trauma. No recent imaging; has Rx for Xray; will go today per pt. Chronic LBP. Was working on Motorcycle this morning. Wants to have less pain and be able to move easier. Pt notes he goes to gym; does arm/leg exercises; usually does not increase sx; soreness with UT exercises so he stopped doing those.     Quality of life: excellent    Patient Goals  Patient goals for therapy: decreased pain and increased motion  Patient's goals regarding treatment: less tightness/tension, feel better.  Pain  Current pain ratin  At best pain ratin  At worst pain rating: 10  Location: B central c-spine, c-spine PVMs, UTs  Quality: tight, dull ache, discomfort and sharp (tense/tight)  Alleviating factors: see above.  Exacerbated by: see above.  Progression: worsening    Social Support    Employment status: working ()  Hand dominance: right      Diagnostic Tests  No diagnostic tests performed    FCE comments: Has order for xray; going to go to day after PT Treatments  Previous treatment: medication  Current treatment: medication and physical therapy      Objective     Postural Observations  Seated posture: fair  Standing posture: fair  Correction of posture: has no consistent effect    Additional Postural Observation Details  Forward head/rounded shoulders  Increased thoracic kyphosis   B scapular depression and protraction with mild winging   PPT       Tenderness     Additional Tenderness Details  Increased mm tension /tone B Uts, levator scap, c-spine PVMs, suboccipital Mms     Neurological Testing     Sensation    Cervical/Thoracic   Left   Intact: light touch    Right   Intact: light touch    Reflexes   Left   Biceps (C5/C6): normal (2+)  Brachioradialis (C6): normal (2+)  Triceps (C7): normal (2+)  Daily's reflex: negative    Right   Biceps (C5/C6): normal (2+)  Brachioradialis (C6): normal (2+)  Triceps (C7): normal (2+)  Daily's reflex: negative    Active Range of Motion   Cervical/Thoracic Spine       Cervical    Flexion: 50 (pain central and R/L c-spine last 10-15* AROM) degrees  with pain  Extension: 30 (pain central and R/L c-spine last 10-15* AROM) degrees     with pain  Left lateral flexion: 25 (Pain R c-spine/UT > L t/o ROM per pt) degrees     with pain  Right lateral flexion: 45 (pain L c-spine/UT > R per pt t/o ROM) degrees     with pain  Left rotation: 45 (pain R/L c-spine and UT t/o ROM) degrees with pain  Right rotation: 60 (pain L > R UT/c-spine t/o ROM) degrees    with pain    Additional Active Range of Motion Details  No radicular pain/sx with/following AROM c-spine all planes; local sx only  B shoulder flex 75% AROM ; shoulder stiffness only; notes reduced neck tension/soreness with end range shoulder flex  B shoulder IR/ER AROM 75% with shoulder stiffness only; no neck pain/sx  Elbow/wrist AROM WFL without pain/sx      Passive Range of Motion     Additional Passive Range of Motion Details  TBA    Strength/Myotome Testing   Cervical Spine     Left   Interossei strength (t1): 4 and 4+    Right   Interossei strength (t1): 4+ and 4    Left Shoulder     Planes of Motion   Flexion: 4+   Abduction: 4+   External rotation at 0°: 4   Internal rotation at 0°: 4+     Right Shoulder     Planes of Motion   Flexion: 4+   Abduction: 4+   External rotation at 0°: 4   Internal rotation at 0°: 4+     Left Elbow   Flexion: 4  Extension: 4    Right Elbow   Flexion: 4+  Extension: 4+    Left Wrist/Hand   Wrist extension: 4+  Wrist flexion: 4+  Thumb extension: 4    Right Wrist/Hand   Wrist extension: 4+  Wrist flexion:  "4+  Thumb extension: 4    Tests   Cervical   Negative VBI.     Left   Negative Spurling's Test A.     Right   Negative Spurling's Test A.     Additional Tests Details  Will cont to assess upcoming sessions             Precautions: asthma, anxiety/depression, chronic neck/back pain, AS       Re-eval Date:  11/13    Date 10/2/2024        Visit Count 1       FOTO 10/2/2024        Pain In See IE       Pain Out See IE           Manuals 10/2/2024        Gentle SOR, gentle c-spine manual traction/distraction stretch, STM/DTM R/L c-spine PVMs, suboccpitials, Uts         Consider GIASTM R/L UT and c-spine as sarah                         Neuro Re-Ed        DNF training        Per scapular mm training :  Prone pivots standing at wall-endurance      Scapular wall wipers                                                 Ther Ex        UT stretch      Levator stretch R/L 3-4x30\" ea reviewed HEP    3-4x30\" ea R/L  Reviewed HEP        Chin tucks supine      Snags with towel R/L rotation c-spine        Seated thoracic extension in chair with roll     Thread the needle t-spine Rotation R/L       Pec/t-spine ext stretch over tball vs roll         DNF endurance         Necks level         B ER     Scapular clocks with tband                         Ther Activity pt education regarding pathophysiology/pathoanatomy of present pain/sx and condition, role of PT in improving pain/sx and function, pt education regarding activity modification to avoid exacerbation of sx and delayed recovery                           Gait Training                        Modalities Prn                              10/2/2024  - HEP was issued and reviewed this date for above noted exercises. Pt demonstrated understanding without incident and without questions/concerns. Will continue to update upcoming.            "

## 2024-10-08 ENCOUNTER — TELEPHONE (OUTPATIENT)
Dept: OBGYN CLINIC | Facility: CLINIC | Age: 34
End: 2024-10-08

## 2024-10-08 NOTE — TELEPHONE ENCOUNTER
MADDI that he should reschedule his appointment today with Dr. Campbell for after he has his EMG's done.

## 2024-10-30 ENCOUNTER — PROCEDURE VISIT (OUTPATIENT)
Dept: PAIN MEDICINE | Facility: CLINIC | Age: 34
End: 2024-10-30
Payer: COMMERCIAL

## 2024-10-30 VITALS — SYSTOLIC BLOOD PRESSURE: 148 MMHG | DIASTOLIC BLOOD PRESSURE: 92 MMHG | HEART RATE: 90 BPM

## 2024-10-30 DIAGNOSIS — M79.18 MYOFASCIAL PAIN SYNDROME: Primary | ICD-10-CM

## 2024-10-30 PROCEDURE — 76942 ECHO GUIDE FOR BIOPSY: CPT | Performed by: STUDENT IN AN ORGANIZED HEALTH CARE EDUCATION/TRAINING PROGRAM

## 2024-10-30 PROCEDURE — 20553 NJX 1/MLT TRIGGER POINTS 3/>: CPT | Performed by: STUDENT IN AN ORGANIZED HEALTH CARE EDUCATION/TRAINING PROGRAM

## 2024-10-30 RX ORDER — METHYLPREDNISOLONE ACETATE 40 MG/ML
20 INJECTION, SUSPENSION INTRA-ARTICULAR; INTRALESIONAL; INTRAMUSCULAR; SOFT TISSUE
Status: COMPLETED | OUTPATIENT
Start: 2024-10-30 | End: 2024-10-30

## 2024-10-30 RX ORDER — BUPIVACAINE HYDROCHLORIDE 2.5 MG/ML
5 INJECTION, SOLUTION EPIDURAL; INFILTRATION; INTRACAUDAL
Status: COMPLETED | OUTPATIENT
Start: 2024-10-30 | End: 2024-10-30

## 2024-10-30 RX ADMIN — BUPIVACAINE HYDROCHLORIDE 5 ML: 2.5 INJECTION, SOLUTION EPIDURAL; INFILTRATION; INTRACAUDAL at 10:00

## 2024-10-30 RX ADMIN — METHYLPREDNISOLONE ACETATE 20 MG: 40 INJECTION, SUSPENSION INTRA-ARTICULAR; INTRALESIONAL; INTRAMUSCULAR; SOFT TISSUE at 10:00

## 2024-10-30 NOTE — PROGRESS NOTES
" Universal Protocol:  Consent: Written consent obtained.  Risks and benefits: risks, benefits and alternatives were discussed  Consent given by: patient  Time out: Immediately prior to procedure a \"time out\" was called to verify the correct patient, procedure, equipment, support staff and site/side marked as required.  Patient understanding: patient states understanding of the procedure being performed  Patient consent: the patient's understanding of the procedure matches consent given  Procedure consent: procedure consent matches procedure scheduled  Site marked: the operative site was marked  Patient identity confirmed: verbally with patient  Supporting Documentation  Indications: pain   Trigger Point Injections: multiple trigger points: 3 or more muscle groups    Injection site identified by: ultrasound  Procedure Details  Location(s):    Neck/Upper Back: L upper trapezius and R upper trapezius     Lower Back: L lumbar paraspinals and R lumbar paraspinals     Prep: patient was prepped and draped in usual sterile fashion  Needle size: 25 G  Medications: 5 mL bupivacaine (PF) 0.25 %; 20 mg methylPREDNISolone acetate 40 mg/mL  Patient tolerance: patient tolerated the procedure well with no immediate complications          "

## 2024-11-13 ENCOUNTER — TELEPHONE (OUTPATIENT)
Dept: PAIN MEDICINE | Facility: CLINIC | Age: 34
End: 2024-11-13

## 2024-11-28 DIAGNOSIS — K21.9 GASTROESOPHAGEAL REFLUX DISEASE, UNSPECIFIED WHETHER ESOPHAGITIS PRESENT: ICD-10-CM

## 2024-12-02 ENCOUNTER — TELEPHONE (OUTPATIENT)
Age: 34
End: 2024-12-02

## 2024-12-02 RX ORDER — ESCITALOPRAM OXALATE 5 MG/1
TABLET ORAL
Refills: 0 | OUTPATIENT
Start: 2024-12-02

## 2024-12-02 RX ORDER — PANTOPRAZOLE SODIUM 40 MG/1
40 TABLET, DELAYED RELEASE ORAL DAILY
Qty: 60 TABLET | Refills: 0 | Status: SHIPPED | OUTPATIENT
Start: 2024-12-02

## 2024-12-02 RX ORDER — ESCITALOPRAM OXALATE 10 MG/1
10 TABLET ORAL DAILY
Qty: 30 TABLET | Refills: 0 | OUTPATIENT
Start: 2024-12-02

## 2024-12-02 NOTE — TELEPHONE ENCOUNTER
PA for pantoprazole (PROTONIX) 40 mg tablet  DENIED    Reason:(Screenshot if applicable)        Message sent to office clinical pool Yes    Denial letter scanned into Media Yes    Appeal started No (Provider will need to decide if appeal is warranted and send clinical documentation to Prior Authorization Team for initiation.)    **Please follow up with your patient regarding denial and next steps**

## 2024-12-02 NOTE — TELEPHONE ENCOUNTER
PA for pantoprazole (PROTONIX) 40 mg tablet SUBMITTED to     via    []CMM-KEY:   [x]Surescripts-Case ID # [id number]   []Availity-Auth ID # NDC #   []Faxed to plan   []Other website   []Phone call Case ID #     [x]PA sent as URGENT    All office notes, labs and other pertaining documents and studies sent. Clinical questions answered. Awaiting determination from insurance company.     Turnaround time for your insurance to make a decision on your Prior Authorization can take 7-21 business days.

## 2024-12-11 ENCOUNTER — NURSE TRIAGE (OUTPATIENT)
Age: 34
End: 2024-12-11

## 2024-12-11 DIAGNOSIS — R39.198 DIFFICULTY URINATING: Primary | ICD-10-CM

## 2024-12-11 NOTE — TELEPHONE ENCOUNTER
Patient last seen in the [place] office called in with concerns of having difficulty urinating. States this has been going on for 3 weeks. States he has to strain to void. Reports having pelvic pressure despite drinking plenty of water. Offered patient an appointment tomorrow for PVR but states he can only do Saturdays or Wednesdays due to work schedule. States he is able to do next available on 12/18 in [place]. Strict ED precautions and advised patient to go to ED if symptoms continue. Verbalized understanding.     Reason for Disposition   The patient has difficulty urinating and does not feel bladder is empty or is unable to urinate during office hours    Answer Assessment - Initial Assessment Questions  1. When was the last time you voided?   About 6 hours ago  2. Are you straining to void?   Yes   3. Do you have any abdominal pain? If yes, can you please describe it? Do you have suprapubic or pelvic pressure?   Pelvic pressure   4. Do you have other urinary symptoms such as frequency, urgency, incontinence, dysuria?   No  5. Are your bowel movements regular?   Yes   6. Are you taking any pain medication, or have you taken any allergy or cold medication?   No    Protocols used: Urology-Difficulty / Unable To Void-ADULT-[place]

## 2024-12-12 NOTE — TELEPHONE ENCOUNTER
Called and LMOM for patient that NAIMA Hernandez placed orders for urine testing to rule out infection. Advised that patient can provide a urine sample at any [facility] and office will call with results. Patient was scheduled for a follow up appointment with David on 12/18 in the [place] office. Asked patient to return call if unable to make the follow up.

## 2024-12-12 NOTE — TELEPHONE ENCOUNTER
Orders placed for urine testing to exclude infection.  Please schedule follow-up visit to assess.  Patient has not been seen for over 2 years difficult to determine potential cause without physical exam.

## 2024-12-18 ENCOUNTER — OFFICE VISIT (OUTPATIENT)
Dept: UROLOGY | Facility: CLINIC | Age: 34
End: 2024-12-18
Payer: COMMERCIAL

## 2024-12-18 VITALS
OXYGEN SATURATION: 99 % | TEMPERATURE: 98 F | DIASTOLIC BLOOD PRESSURE: 94 MMHG | HEIGHT: 68 IN | HEART RATE: 68 BPM | WEIGHT: 232.6 LBS | BODY MASS INDEX: 35.25 KG/M2 | SYSTOLIC BLOOD PRESSURE: 138 MMHG

## 2024-12-18 DIAGNOSIS — R39.198 DIFFICULTY URINATING: Primary | ICD-10-CM

## 2024-12-18 DIAGNOSIS — N35.911 STRICTURE OF URETHRAL MEATUS IN MALE, UNSPECIFIED STRICTURE TYPE: ICD-10-CM

## 2024-12-18 DIAGNOSIS — Z87.442 HISTORY OF KIDNEY STONES: ICD-10-CM

## 2024-12-18 DIAGNOSIS — E29.1 HYPOGONADISM IN MALE: ICD-10-CM

## 2024-12-18 DIAGNOSIS — F52.32 ANORGASMIA OF MALE: ICD-10-CM

## 2024-12-18 LAB
POST-VOID RESIDUAL VOLUME, ML POC: 65 ML
SL AMB  POCT GLUCOSE, UA: NEGATIVE
SL AMB LEUKOCYTE ESTERASE,UA: NEGATIVE
SL AMB POCT BILIRUBIN,UA: NEGATIVE
SL AMB POCT BLOOD,UA: NEGATIVE
SL AMB POCT CLARITY,UA: CLEAR
SL AMB POCT COLOR,UA: YELLOW
SL AMB POCT KETONES,UA: NEGATIVE
SL AMB POCT NITRITE,UA: NEGATIVE
SL AMB POCT PH,UA: 6
SL AMB POCT SPECIFIC GRAVITY,UA: 1.01
SL AMB POCT URINE PROTEIN: NEGATIVE
SL AMB POCT UROBILINOGEN: 0.2

## 2024-12-18 PROCEDURE — 51798 US URINE CAPACITY MEASURE: CPT

## 2024-12-18 PROCEDURE — 81002 URINALYSIS NONAUTO W/O SCOPE: CPT

## 2024-12-18 PROCEDURE — 99213 OFFICE O/P EST LOW 20 MIN: CPT

## 2024-12-18 RX ORDER — ALFUZOSIN HYDROCHLORIDE 10 MG/1
10 TABLET, EXTENDED RELEASE ORAL DAILY
Qty: 30 TABLET | Refills: 6 | Status: SHIPPED | OUTPATIENT
Start: 2024-12-18

## 2024-12-18 NOTE — PROGRESS NOTES
12/18/2024    No chief complaint on file.      Assessment and Plan    34 y.o. male manage by AP Team    1.  Difficulty urinating  2.  Anorgasmia  3.  Meatal stenosis  4.  History of kidney stones  Exact etiology is unclear and I suspect he likely has multiple factors contributing to this. He has used testosterone cypionate in the past with last use about 6 months ago. He stopped this after his total testosterone was 1342 in May. He has however continued to use over the counter testosterone supplement from [employer] as well as creatine supplements for weight lifting. I suspect this may be contributing in some way to the acute onset of symptoms. Possibly causing inflammation to the prostate. I have recommended that he stop all supplements. I am also recommending an update testosterone level. He is adamant about not using any other additional supplements other than what he has purchased from [employer].  He does have meatal stenosis on exam today which per his report has been normal for him his entire life.  I doubt this is contributing to his acute symptoms.  Cannot entirely exclude possible urethral stricture although he denies any recent trauma or need for catheters in the past.  He has a history of kidney stones but denies any gross hematuria and urine testing is negative for infection or blood today.  Given complaints of suprapubic fullness and pressure I am recommending CT to exclude possible acute obstructing stone.  He may also have a possible component of pelvic floor dysfunction and may benefit from pelvic floor physical therapy.  He is not able to tolerate Flomax because it causes GI upset.  I am recommending alfuzosin instead.  If he has GI upset from this we could consider possible Cialis 5 mg daily.  Rectal exam was benign without gross prostatomegaly.  Finally I am recommending cystoscopy evaluation.  I did offer to perform meatal dilation but he declined today.            Interval HPI:    He presents today  for evaluation of difficulty with urination.  This started approxi-1 month ago and he has been having difficulty with hesitancy.  It can take upwards of 15 minutes for him to urinate.  He also reports nocturia every hour during the nighttime.  In addition he also reports inability to achieve an orgasm over this time.  He denies any pain or bothersome urinary symptoms.  No reports of gross hematuria.  Feels a pressure fullness in the lower abdomen making it difficult for him to urinate.    He has a history of hypogonadism and has used testosterone cypionate in the past but this was never formally prescribed to him and he had been getting it online.  He last used testosterone cypionate approximately 6 months ago.  It was noted that his total testosterone was 1342 on 5/29/2024.  This prompted him to stop using testosterone cypionate.  He is not sure what dose he had been using at the time and reports that he was administering 1 mL.  He has continued to use over-the-counter testosterone supplements as well as creatine supplements.  He is taking testosterone X from [employer] as well as [employer] with amino acids.     He has a history of kidney stones but denies any flank pain.  Urine dip today is negative for infection or blood.  He is emptying well with a PVR of 64 mL.  Renal ultrasound in May 2024 showed multiple bilateral minimally complex renal cyst.  Questional shadowing calculi on the right no calculi on the left.  He denies any recent changes to any medications.        History of Present Illness  Felix Rabago is a 34 y.o. male here for evaluation of difficulty urinating     Established patient not seen since initial consultation in May 2022 by NAIMA De Anda at which time he was seen for nephrolithiasis and bladder wall thickening.  He had CT imaging in May 2022 due to complaints of suprapubic pain.  Both exams revealed stable punctate bilateral nonobstructing renal calculi.  His initial exam revealed  diffuse bladder wall thickening consistent with sequela of chronic cystitis and/or chronic outlet obstruction.  Urine testing at time was unremarkable.  He reported weight loss of 22 pounds around that time and was being worked up by GI.  He had been on both duloxetine and sertraline at the same time x 4 months, these were stopped abruptly in early May. He was then only taking suboxone.     He reports drinking a gallon of water a day. His urine however looks very dark yellow in color. He denies soda intake. He reports he was having some difficulty urinating but this has improved. Denies recurrent UTI. Denies gross hematuria. Denies STI hx.             Review of Systems   Constitutional:  Negative for chills and fever.   HENT:  Negative for congestion and sore throat.    Respiratory:  Negative for cough and shortness of breath.    Cardiovascular:  Negative for chest pain and leg swelling.   Gastrointestinal:  Negative for abdominal pain, constipation and diarrhea.   Genitourinary:  Positive for difficulty urinating. Negative for dysuria, frequency, hematuria and urgency.        Hesitancy, anorgasmia   Musculoskeletal:  Negative for back pain and gait problem.   Skin:  Negative for wound.   Allergic/Immunologic: Negative for immunocompromised state.   Hematological:  Does not bruise/bleed easily.               Vitals  There were no vitals filed for this visit.    Physical Exam  Vitals reviewed.   Constitutional:       General: He is not in acute distress.     Appearance: Normal appearance. He is not ill-appearing or toxic-appearing.   HENT:      Head: Normocephalic and atraumatic.   Eyes:      General: No scleral icterus.     Conjunctiva/sclera: Conjunctivae normal.   Cardiovascular:      Rate and Rhythm: Normal rate.   Pulmonary:      Effort: Pulmonary effort is normal. No respiratory distress.   Abdominal:      Tenderness: There is no right CVA tenderness or left CVA tenderness.      Hernia: No hernia is present.    Genitourinary:     Comments: Meatal stenosis noted otherwise normal exam.  Rectal exam reveals smooth symmetric prostate without palpable nodules bilaterally.  Estimated gland size 30 g.  Musculoskeletal:      Cervical back: Normal range of motion.      Right lower leg: No edema.      Left lower leg: No edema.   Skin:     General: Skin is warm and dry.      Coloration: Skin is not jaundiced or pale.   Neurological:      General: No focal deficit present.      Mental Status: He is alert and oriented to person, place, and time. Mental status is at baseline.      Gait: Gait normal.   Psychiatric:         Mood and Affect: Mood normal.         Behavior: Behavior normal.         Thought Content: Thought content normal.         Judgment: Judgment normal.         Past History  Past Medical History:   Diagnosis Date    Asthma     Depressed     Substance abuse (HCC)      Social History     Socioeconomic History    Marital status: Single     Spouse name: Not on file    Number of children: Not on file    Years of education: Not on file    Highest education level: Not on file   Occupational History    Not on file   Tobacco Use    Smoking status: Former     Current packs/day: 1.00     Average packs/day: 1 pack/day for 10.0 years (10.0 ttl pk-yrs)     Types: Cigarettes    Smokeless tobacco: Never   Vaping Use    Vaping status: Never Used   Substance and Sexual Activity    Alcohol use: Not Currently    Drug use: No    Sexual activity: Yes     Partners: Female   Other Topics Concern    Not on file   Social History Narrative    Not on file     Social Drivers of Health     Financial Resource Strain: Not on file   Food Insecurity: Not on file   Transportation Needs: Not on file   Physical Activity: Not on file   Stress: Not on file   Social Connections: Not on file   Intimate Partner Violence: Not on file   Housing Stability: Not on file     Social History     Tobacco Use   Smoking Status Former    Current packs/day: 1.00    Average  packs/day: 1 pack/day for 10.0 years (10.0 ttl pk-yrs)    Types: Cigarettes   Smokeless Tobacco Never     Family History   Problem Relation Age of Onset    No Known Problems Mother     No Known Problems Father        The following portions of the patient's history were reviewed and updated as appropriate allergies, current medications, past medical history, past social history, past surgical history and problem list    [email or web address]    RENAL ULTRASOUND WITH PVR     INDICATION: R39.11: Hesitancy of micturition.     COMPARISON: CT performed May 16, 2022. Ultrasound performed May 25, 2022.     TECHNIQUE: Ultrasound of the retroperitoneum was performed with a curvilinear transducer utilizing volumetric sweeps and still imaging techniques.     FINDINGS:     KIDNEYS:  Symmetric and normal size.  Right kidney: 11.9 x 7.0 x 6.6 cm. Volume 284.8 mL  Left kidney: 11.2 x 6.3 x 5.8 cm. Volume 212.8 mL     Right kidney  Normal echogenicity and contour.  Multiple cysts, many with thin septations, largest in the interpolar right kidney measuring 1.8 cm and slightly increased from 1.6 cm in greatest dimension on May 25, 2022. None of the cysts are significantly changed in sonographic character from   previous exam.  No hydronephrosis.  Echogenic foci with posterior acoustic shadowing suspicious for either nonobstructing 2 mm lower pole right renal calculi or perhaps milk of calcium within tiny cortical microcysts.  No perinephric fluid collections.     Left kidney  Normal echogenicity and contour.  Multiple cysts, some with thin septations or layering debris but none with suspicious associated solid soft tissue detected, largest in the interpolar left kidney measuring 1.6 cm in greatest dimension, slightly increased from 1.0 cm in May 2022. None of   the cysts are significantly changed in sonographic character since previous exam.  No hydronephrosis.  No shadowing calculi.  No perinephric fluid collections.      URETERS:  Nonvisualized.     BLADDER:  Normally distended.  No focal thickening or mass lesions.  Bilateral ureteral jets detected.  Prevoid: 94.8  No significant post void volume. Measured post void volume in mL: 7.2        IMPRESSION:     Multiple small minimally complex bilateral renal cysts, some slightly increased in size since May 2022 but not significantly changed in sonographic character since that examination.     No hydronephrosis.     Insignificant postvoid urinary bladder residual volume.    Results  No results found for this or any previous visit (from the past hour).]  Lab Results   Component Value Date    PSA 0.88 05/29/2024    PSA 1.14 07/03/2023     Lab Results   Component Value Date    CALCIUM 9.3 05/29/2024    K 4.1 05/29/2024    CO2 29 05/29/2024     05/29/2024    BUN 10 05/29/2024    CREATININE 0.90 05/29/2024     Lab Results   Component Value Date    WBC 7.81 05/29/2024    HGB 17.5 (H) 05/29/2024    HCT 55.4 (H) 05/29/2024    MCV 84 05/29/2024     05/29/2024       Please Note:  Voice dictation software has been used to create this document. There may be inadvertent transcriptions errors.     NAIMA Burks 12/18/24

## 2025-01-22 ENCOUNTER — OFFICE VISIT (OUTPATIENT)
Dept: FAMILY MEDICINE CLINIC | Facility: CLINIC | Age: 35
End: 2025-01-22
Payer: COMMERCIAL

## 2025-01-22 VITALS
HEIGHT: 68 IN | HEART RATE: 93 BPM | WEIGHT: 233 LBS | OXYGEN SATURATION: 99 % | TEMPERATURE: 98.5 F | SYSTOLIC BLOOD PRESSURE: 136 MMHG | DIASTOLIC BLOOD PRESSURE: 80 MMHG | BODY MASS INDEX: 35.31 KG/M2

## 2025-01-22 DIAGNOSIS — R53.83 OTHER FATIGUE: ICD-10-CM

## 2025-01-22 DIAGNOSIS — E78.1 HYPERTRIGLYCERIDEMIA: ICD-10-CM

## 2025-01-22 DIAGNOSIS — F11.20 CONTINUOUS OPIOID DEPENDENCE (HCC): ICD-10-CM

## 2025-01-22 DIAGNOSIS — R06.83 SNORING: ICD-10-CM

## 2025-01-22 DIAGNOSIS — J45.30 MILD PERSISTENT ASTHMA, UNSPECIFIED WHETHER COMPLICATED: ICD-10-CM

## 2025-01-22 DIAGNOSIS — I10 PRIMARY HYPERTENSION: ICD-10-CM

## 2025-01-22 DIAGNOSIS — R39.198 DIFFICULTY URINATING: ICD-10-CM

## 2025-01-22 DIAGNOSIS — M45.0 ANKYLOSING SPONDYLITIS OF MULTIPLE SITES IN SPINE (HCC): ICD-10-CM

## 2025-01-22 DIAGNOSIS — Z23 ENCOUNTER FOR IMMUNIZATION: ICD-10-CM

## 2025-01-22 DIAGNOSIS — Z13.29 SCREENING FOR THYROID DISORDER: ICD-10-CM

## 2025-01-22 DIAGNOSIS — Z00.00 ANNUAL PHYSICAL EXAM: Primary | ICD-10-CM

## 2025-01-22 LAB
BILIRUB UR QL STRIP: NEGATIVE
CLARITY UR: CLEAR
COLOR UR: YELLOW
GLUCOSE UR STRIP-MCNC: ABNORMAL MG/DL
HGB UR QL STRIP.AUTO: NEGATIVE
KETONES UR STRIP-MCNC: NEGATIVE MG/DL
LEUKOCYTE ESTERASE UR QL STRIP: NEGATIVE
NITRITE UR QL STRIP: NEGATIVE
PH UR STRIP.AUTO: 7 [PH]
PROT UR STRIP-MCNC: NEGATIVE MG/DL
SP GR UR STRIP.AUTO: 1.02 (ref 1–1.03)
UROBILINOGEN UR STRIP-ACNC: 2 MG/DL

## 2025-01-22 PROCEDURE — 99395 PREV VISIT EST AGE 18-39: CPT | Performed by: NURSE PRACTITIONER

## 2025-01-22 PROCEDURE — 99214 OFFICE O/P EST MOD 30 MIN: CPT | Performed by: NURSE PRACTITIONER

## 2025-01-22 RX ORDER — FLUTICASONE PROPIONATE AND SALMETEROL 250; 50 UG/1; UG/1
1 POWDER RESPIRATORY (INHALATION) 2 TIMES DAILY
Qty: 60 BLISTER | Refills: 0 | Status: SHIPPED | OUTPATIENT
Start: 2025-01-22 | End: 2025-02-21

## 2025-01-22 RX ORDER — ALBUTEROL SULFATE 90 UG/1
2 INHALANT RESPIRATORY (INHALATION) EVERY 6 HOURS PRN
Qty: 18 G | Refills: 5 | Status: SHIPPED | OUTPATIENT
Start: 2025-01-22

## 2025-01-22 NOTE — ASSESSMENT & PLAN NOTE
Orders:  •  Fluticasone-Salmeterol (Advair Diskus) 250-50 mcg/dose inhaler; Inhale 1 puff 2 (two) times a day Rinse mouth after use.

## 2025-01-22 NOTE — PROGRESS NOTES
Adult Annual Physical  Name: Fleix Rabago      : 1990      MRN: [id number]  Encounter Provider: NAIMA Moon  Encounter Date: 2025   Encounter department: [facility]    Assessment & Plan  Annual physical exam         Encounter for immunization    Orders:  •  Pneumococcal Conjugate Vaccine 20-valent (Pcv20)  •  influenza vaccine preservative-free 0.5 mL IM (Fluzone, Afluria, Fluarix, Flulaval)    Mild persistent asthma, unspecified whether complicated  States that over the last few months, he has been having increase in asthma symptoms. He is feeling short of breath, wheezing and chest tightness. He has been off his albuterol and Advair for awhile now. Will restart both. Rinse mouth after Advair use.   Orders:  •  albuterol (Ventolin HFA) 90 mcg/act inhaler; Inhale 2 puffs every 6 (six) hours as needed for wheezing  •  Fluticasone-Salmeterol (Advair Diskus) 250-50 mcg/dose inhaler; Inhale 1 puff 2 (two) times a day Rinse mouth after use.    Hypertriglyceridemia    Orders:  •  CBC and differential; Future  •  Comprehensive metabolic panel; Future  •  Lipid panel; Future    Screening for thyroid disorder    Orders:  •  TSH, 3rd generation with Free T4 reflex; Future    Other fatigue  Feels tired all day, not sleeping well, can not stay asleep but he is not waking up at night anxious. He states he just can not stay asleep. Wife states that he snores loudly. He feels like he is not refreshed when he wakes in the morning and he could fall asleep at any time during the day.   Neck circ- 18 in.   STOP BANG score is 5.   Orders:  •  Ambulatory Referral to Sleep Medicine; Future    Snoring    Orders:  •  Ambulatory Referral to Sleep Medicine; Future    Difficulty urinating  Requesting urine test as his wife keeps getting BV And yeast infections, likely not related to UTI but will check for patient due to his request.   Orders:  •  UA w Reflex to Microscopic w Reflex to  Culture; Future    Ankylosing spondylitis of multiple sites in spine ([facility])         Continuous opioid dependence ([facility])         Primary hypertension  Controlled without medication at this time, will continue to monitor. Have sleep study.        Immunizations and preventive care screenings were discussed with patient today. Appropriate education was printed on patient's after visit summary.    Counseling:  Dental Health: discussed importance of regular tooth brushing, flossing, and dental visits.  Exercise: the importance of regular exercise/physical activity was discussed. Recommend exercise 3-5 times per week for at least 30 minutes.          History of Present Illness     Adult Annual Physical:  Patient presents for annual physical.     Diet and Physical Activity:  - Diet/Nutrition: consuming 3-5 servings of fruits/vegetables daily, frequent junk food and well balanced diet.  - Exercise: walking.    Depression Screening:  - PHQ-2 Score: 0    General Health:  - Sleep: 4-6 hours of sleep on average and sleeps well.  - Hearing: normal hearing right ear.  - Vision: wears glasses, goes for regular eye exams and wears contacts.  - Dental: regular dental visits and brushes teeth twice daily.    Review of Systems   Constitutional:  Negative for chills and fever.   HENT:  Negative for ear pain and sore throat.    Eyes:  Negative for pain and visual disturbance.   Respiratory:  Positive for chest tightness, shortness of breath and wheezing. Negative for cough.    Cardiovascular:  Negative for chest pain and palpitations.   Gastrointestinal:  Negative for abdominal pain, constipation, diarrhea, nausea and vomiting.   Genitourinary:  Negative for dysuria, enuresis, frequency, hematuria and urgency.   Musculoskeletal:  Negative for arthralgias and back pain.   Skin:  Negative for color change and rash.   Neurological:  Negative for dizziness, seizures, syncope and headaches.   All other systems reviewed and are  "negative.        Objective   /80   Pulse 93   Temp 98.5 °F (36.9 °C)   Ht 5' 8\" (1.727 m)   Wt 106 kg (233 lb)   SpO2 99%   BMI 35.43 kg/m²     Physical Exam  Constitutional:       General: He is not in acute distress.     Appearance: Normal appearance. He is not ill-appearing or toxic-appearing.   HENT:      Head: Normocephalic and atraumatic.      Right Ear: Tympanic membrane, ear canal and external ear normal. There is no impacted cerumen.      Left Ear: Tympanic membrane, ear canal and external ear normal. There is no impacted cerumen.      Nose: Nose normal. No congestion or rhinorrhea.      Mouth/Throat:      Mouth: Mucous membranes are moist.      Pharynx: Oropharynx is clear. No oropharyngeal exudate or posterior oropharyngeal erythema.   Eyes:      General: No scleral icterus.        Right eye: No discharge.         Left eye: No discharge.      Conjunctiva/sclera: Conjunctivae normal.      Pupils: Pupils are equal, round, and reactive to light.   Cardiovascular:      Rate and Rhythm: Normal rate and regular rhythm.      Pulses: Normal pulses.      Heart sounds: Normal heart sounds. No murmur heard.     No friction rub. No gallop.   Pulmonary:      Effort: Pulmonary effort is normal. No respiratory distress.      Breath sounds: Normal breath sounds. No stridor. No wheezing, rhonchi or rales.   Abdominal:      General: Abdomen is flat. Bowel sounds are normal. There is no distension.      Palpations: Abdomen is soft. There is no mass.      Tenderness: There is no abdominal tenderness.   Musculoskeletal:      Cervical back: Normal range of motion and neck supple. No rigidity. No muscular tenderness.   Lymphadenopathy:      Cervical: No cervical adenopathy.   Skin:     General: Skin is warm and dry.      Capillary Refill: Capillary refill takes less than 2 seconds.   Neurological:      General: No focal deficit present.      Mental Status: He is alert and oriented to person, place, and time. Mental " status is at baseline.      Sensory: No sensory deficit.      Motor: No weakness.      Gait: Gait normal.   Psychiatric:         Mood and Affect: Mood normal.         Behavior: Behavior normal.         Thought Content: Thought content normal.         Judgment: Judgment normal.

## 2025-01-23 ENCOUNTER — RESULTS FOLLOW-UP (OUTPATIENT)
Dept: FAMILY MEDICINE CLINIC | Facility: CLINIC | Age: 35
End: 2025-01-23

## 2025-01-23 DIAGNOSIS — R82.2 BILIRUBIN IN URINE: ICD-10-CM

## 2025-01-23 DIAGNOSIS — R81 GLUCOSURIA: Primary | ICD-10-CM

## 2025-02-10 ENCOUNTER — TELEPHONE (OUTPATIENT)
Age: 35
End: 2025-02-10

## 2025-02-10 NOTE — PROGRESS NOTES
UROLOGY PROGRESS NOTE   [facility]  [address] [place] [address] [place]  [address]  [place] [address]  [phone]  Fax:[phone]  [email or web address]      NAME: Felix Rabago  AGE: 34 y.o. SEX: male  : 1990   MRN: [id number]    DATE: 2025  TIME: 8:56 AM    Assessment and Plan:    Urinary hesitancy, frequency, weak stream and nocturia-pelvic floor dysfunction certainly is a factor here.  I referred him to pelvic floor physical therapy.  I think this is the best thing for him.    Anorgasmia, delayed ejaculation, erectile dysfunction-probably due to his medications, and the medications are helping him with depression.  Obviously stopping these would help with the sexual function but depression is also a serious consideration in stopping the medications.  Will start him on Cialis 5 mg daily which can help facilitate his voiding and also can help with erections.  I will check his testosterone and estrogen levels and send him the results.    Meatal stenosis-I think this is more of a problem with me placing the scope rather than actually affecting his voiding.  However we will see how he does with the dilation.  He will let me know if he has any problems.  Follow-up with me as needed.               Chief Complaint   No chief complaint on file.      History of Present Illness   33 y/o man, established pt but new to me- seen by Mr Esquivel 23 for difficulty urinating, anorgasmia, history of T replacement last use 2024, hx of nephrolithiasis, pelvic floor dysfunction, couldn't tolerate flomax and was changed to alfuzosin. Set up for cysto to evaluate all of this.  Being on the testosterone in the past did not help with his erectile dysfunction and anorgasmia.  Alfuzosin has not helped him at all.  He still takes sometimes up to 5 minutes to urinate.  Also has a history of meatal stenosis.    He has intercourse with his wife twice a week.  During intercourse he often is unable to complete with  losing hardness and simply becoming exhausted prior to orgasm.  I explained to him that delayed ejaculation/anorgasmia is often a result of medication the same he is on several medications that he had sensation and normal sexual function.  The medications seem to be working quite well for his depression so I told him to keep that in mind that his depression is under control with the medications and he has to weigh the risks and benefits.  He has not tried Cialis or Viagra.  He noticed no difference with testosterone replacement in terms of this condition.  Lowest testosterone that I see was about 232 and it was stopped after it went up to 1342.  Nothing more recent.       Cystoscopy     Date/Time  2/11/2025 8:30 AM     Performed by  Luis Jasso MD   Authorized by  NAIMA Burks     Universal Protocol:  Consent: Verbal consent obtained. Written consent obtained.      Procedure Details:  Procedure type: dilation of urethral stricture    Additional Procedure Details: Cystoscopy Procedure Note        Pre-operative Diagnosis: Urinary hesitancy, nocturia, frequency and pelvic floor dysfunction    Post-operative Diagnosis: Same, with meatal stenosis    Procedure: Flexible cystoscopy with initial urethral dilation with [place], male    Surgeon: Luis Jasso MD    Anesthesia: 1% Xylocaine per urethra    EBL: Minimal    Complications: none    Procedure Details   The risks, benefits, complications, treatment options, and expected outcomes were discussed with the patient. The patient concurred with the proposed plan, giving informed consent.    Cystoscopy was performed today under local anesthesia, using sterile technique. The patient was placed in the supine position, prepped with Betadine, and draped in the usual sterile fashion. The flexible cystocope was used to inspect both the urethra and bladder    Findings:  Urethra: Urethra was open but too small for my scope.  I therefore dilated him to 24 Qatari from  "12 Sudanese with Ellerbe sounds up to the anterior urethra and the urethral meatus..  Otherwise normal without stricture.  Prostate nonocclusive.  Hypertonic sphincter.    Bladder:  Smooth, not trabeculated and there were no stones tumors or other lesions.  The orifices were orthotopic and intact.           Specimens: None                 Complications:  None           Disposition: To home            Condition:  Stable              The following portions of the patient's history were reviewed and updated as appropriate: allergies, current medications, past family history, past medical history, past social history, past surgical history and problem list.  Past Medical History:   Diagnosis Date    Asthma     Depressed     Substance abuse ([facility])      Past Surgical History:   Procedure Laterality Date    APPENDECTOMY      DENTAL SURGERY  03/25/2022     shoulder  Review of Systems   Review of Systems   Genitourinary:         As per [facility]       Active Problem List     Patient Active Problem List   Diagnosis    Intervertebral disc disorder with radiculopathy of thoracic region    Closed wedge compression fracture of T11 vertebra with routine healing    Class 1 obesity due to excess calories without serious comorbidity with body mass index (BMI) of 30.0 to 30.9 in adult    Social anxiety disorder    Insomnia due to other mental disorder    Abnormal blood findings    Bilateral high scrotal testicles    Nausea    Nephrolithiasis    Bladder wall thickening    HSV-1 infection    Ankylosing spondylitis of multiple sites in spine ([facility])    Continuous opioid dependence ([facility])    BROWN (dyspnea on exertion)    Idiopathic scoliosis    Heartburn    Primary hypertension       Objective   /92   Pulse 87   Temp 97.7 °F (36.5 °C)   Ht 5' 8\" (1.727 m)   Wt 103 kg (227 lb)   SpO2 96%   BMI 34.52 kg/m²     Physical Exam  Constitutional:       Appearance: Normal appearance. He is normal weight.   HENT:      Head: Normocephalic and " atraumatic.   Eyes:      Extraocular Movements: Extraocular movements intact.   Pulmonary:      Effort: Pulmonary effort is normal.   Genitourinary:     Penis: Normal.    Musculoskeletal:         General: No swelling or tenderness. Normal range of motion.      Cervical back: Normal range of motion.   Skin:     General: Skin is warm and dry.      Coloration: Skin is not jaundiced or pale.   Neurological:      General: No focal deficit present.      Mental Status: He is alert and oriented to person, place, and time.   Psychiatric:         Mood and Affect: Mood normal.         Behavior: Behavior normal.         Thought Content: Thought content normal.         Judgment: Judgment normal.             Current Medications     Current Outpatient Medications:     cephalexin (KEFLEX) 500 mg capsule, Take 1 capsule (500 mg total) by mouth 1 (one) time for 1 dose Take after procedure, Disp: 1 capsule, Rfl: 0    tadalafil (CIALIS) 5 MG tablet, Take 1 tablet (5 mg total) by mouth in the morning, Disp: 30 tablet, Rfl: 11    albuterol (Ventolin HFA) 90 mcg/act inhaler, Inhale 2 puffs every 6 (six) hours as needed for wheezing, Disp: 18 g, Rfl: 5    Blood Glucose Monitoring Suppl (OneTouch Verio Reflect) w/Device KIT, Check blood sugars once daily. Please substitute with appropriate alternative as covered by patient's insurance. Dx: E11.65, Disp: 1 kit, Rfl: 0    buprenorphine (SUBUTEX) 2 mg, 2 mg daily, Disp: , Rfl:     buprenorphine (SUBUTEX) 8 mg, 8 mg 2 (two) times a day, Disp: , Rfl:     buPROPion (WELLBUTRIN SR) 150 mg 12 hr tablet, , Disp: , Rfl:     escitalopram (LEXAPRO) 10 mg tablet, Take 10 mg by mouth 2 (two) times a day, Disp: , Rfl:     Fluticasone-Salmeterol (Advair Diskus) 250-50 mcg/dose inhaler, Inhale 1 puff 2 (two) times a day Rinse mouth after use., Disp: 60 blister, Rfl: 0    gabapentin (NEURONTIN) 800 mg tablet, Take 800 mg by mouth 3 (three) times a day as needed, Disp: , Rfl:     methocarbamol (ROBAXIN) 500  mg tablet, Take 1 tablet (500 mg total) by mouth 3 (three) times a day as needed for muscle spasms (muscle tension), Disp: 60 tablet, Rfl: 0    pantoprazole (PROTONIX) 40 mg tablet, Take 1 tablet (40 mg total) by mouth daily, Disp: 60 tablet, Rfl: 0        Luis Jasso MD

## 2025-02-10 NOTE — TELEPHONE ENCOUNTER
Pt called to confirm Cysto appt with Dr. Jasso on 2/11/25.  Pt also questioning if he would be able to keep the appt since he will not have the copay for the appt.  Informed pt, as per office clinical staff, he can still come to the appt, and he will be billed for the copay.  Pt was appreciaitive and verbalized understanding.  Pt had no other questions or concerns at this time.

## 2025-02-11 ENCOUNTER — PROCEDURE VISIT (OUTPATIENT)
Dept: UROLOGY | Facility: CLINIC | Age: 35
End: 2025-02-11
Payer: COMMERCIAL

## 2025-02-11 ENCOUNTER — TELEPHONE (OUTPATIENT)
Dept: UROLOGY | Facility: CLINIC | Age: 35
End: 2025-02-11

## 2025-02-11 VITALS
BODY MASS INDEX: 34.4 KG/M2 | HEIGHT: 68 IN | OXYGEN SATURATION: 96 % | TEMPERATURE: 97.7 F | SYSTOLIC BLOOD PRESSURE: 148 MMHG | HEART RATE: 87 BPM | WEIGHT: 227 LBS | DIASTOLIC BLOOD PRESSURE: 92 MMHG

## 2025-02-11 DIAGNOSIS — M62.89 PELVIC FLOOR DYSFUNCTION: ICD-10-CM

## 2025-02-11 DIAGNOSIS — N35.911 STRICTURE OF URETHRAL MEATUS IN MALE, UNSPECIFIED STRICTURE TYPE: ICD-10-CM

## 2025-02-11 DIAGNOSIS — F52.32 ANORGASMIA OF MALE: ICD-10-CM

## 2025-02-11 DIAGNOSIS — N52.9 ERECTILE DYSFUNCTION, UNSPECIFIED ERECTILE DYSFUNCTION TYPE: ICD-10-CM

## 2025-02-11 DIAGNOSIS — Z87.442 HISTORY OF KIDNEY STONES: ICD-10-CM

## 2025-02-11 DIAGNOSIS — E29.1 HYPOGONADISM IN MALE: ICD-10-CM

## 2025-02-11 DIAGNOSIS — R39.198 DIFFICULTY URINATING: Primary | ICD-10-CM

## 2025-02-11 LAB
SL AMB  POCT GLUCOSE, UA: NORMAL
SL AMB LEUKOCYTE ESTERASE,UA: NORMAL
SL AMB POCT BILIRUBIN,UA: NORMAL
SL AMB POCT BLOOD,UA: NORMAL
SL AMB POCT CLARITY,UA: CLEAR
SL AMB POCT COLOR,UA: YELLOW
SL AMB POCT KETONES,UA: NORMAL
SL AMB POCT NITRITE,UA: NORMAL
SL AMB POCT PH,UA: 6
SL AMB POCT SPECIFIC GRAVITY,UA: 1.01
SL AMB POCT URINE PROTEIN: NORMAL
SL AMB POCT UROBILINOGEN: 0.2

## 2025-02-11 PROCEDURE — 81002 URINALYSIS NONAUTO W/O SCOPE: CPT | Performed by: UROLOGY

## 2025-02-11 PROCEDURE — 52281 CYSTOSCOPY AND TREATMENT: CPT | Performed by: UROLOGY

## 2025-02-11 PROCEDURE — 99214 OFFICE O/P EST MOD 30 MIN: CPT | Performed by: UROLOGY

## 2025-02-11 RX ORDER — TADALAFIL 5 MG/1
5 TABLET ORAL DAILY
Qty: 30 TABLET | Refills: 11 | Status: SHIPPED | OUTPATIENT
Start: 2025-02-11

## 2025-02-11 RX ORDER — CEPHALEXIN 500 MG/1
500 CAPSULE ORAL ONCE
Qty: 1 CAPSULE | Refills: 0 | Status: SHIPPED | OUTPATIENT
Start: 2025-02-11 | End: 2025-02-11

## 2025-02-11 NOTE — TELEPHONE ENCOUNTER
----- Message from Luis Jasso MD sent at 2/11/2025  9:00 AM EST -----  Please let patient know that Cialis was sent to his pharmacy.  It is too expensive let me know and I will send it to [facility] in [place]

## 2025-02-11 NOTE — TELEPHONE ENCOUNTER
Called and LMOM for patient that Cialis script was sent to his pharmacy. Advised patient can get medication by utilizing [facility] instead of running it through his insurance if medication is too expensive, or we can send medication to an alternative pharmacy if needed. Asked patient to return call with any questions.

## 2025-02-11 NOTE — LETTER
2025     NAIMA Moon  [address]  [place] [address]    Patient: Felix Rabago   YOB: 1990   Date of Visit: 2025         Hello-I saw Harvey in the office today and I scoped him.  Overall things looked okay, I had to dilate his urethral meatus a bit to accommodate my scope.  I have sent for pelvic floor physical therapy to help with his pelvic floor dysfunction and started on daily Cialis.  I am also checking a testosterone estrogen levels.         Sincerely,        Luis Jasso MD        CC: No Recipients    Luis Jasso MD  2025  8:59 AM  Sign when Signing Visit  UROLOGY PROGRESS NOTE   [facility]  [address] [place] [address] [place]  [address]  [place] [address]  [phone]  Fax:[phone]  [email or web address]      NAME: Felix Rabago  AGE: 34 y.o. SEX: male  : 1990   MRN: [id number]    DATE: 2025  TIME: 8:56 AM    Assessment and Plan:    Urinary hesitancy, frequency, weak stream and nocturia-pelvic floor dysfunction certainly is a factor here.  I referred him to pelvic floor physical therapy.  I think this is the best thing for him.    Anorgasmia, delayed ejaculation, erectile dysfunction-probably due to his medications, and the medications are helping him with depression.  Obviously stopping these would help with the sexual function but depression is also a serious consideration in stopping the medications.  Will start him on Cialis 5 mg daily which can help facilitate his voiding and also can help with erections.  I will check his testosterone and estrogen levels and send him the results.    Meatal stenosis-I think this is more of a problem with me placing the scope rather than actually affecting his voiding.  However we will see how he does with the dilation.  He will let me know if he has any problems.  Follow-up with me as needed.               Chief Complaint   No chief complaint on file.      History of Present Illness   34  y/o man, established pt but new to me- seen by Mr Esquivel 12/18/24 for difficulty urinating, anorgasmia, history of T replacement last use 5/2024, hx of nephrolithiasis, pelvic floor dysfunction, couldn't tolerate flomax and was changed to alfuzosin. Set up for cysto to evaluate all of this.  Being on the testosterone in the past did not help with his erectile dysfunction and anorgasmia.  Alfuzosin has not helped him at all.  He still takes sometimes up to 5 minutes to urinate.  Also has a history of meatal stenosis.    He has intercourse with his wife twice a week.  During intercourse he often is unable to complete with losing hardness and simply becoming exhausted prior to orgasm.  I explained to him that delayed ejaculation/anorgasmia is often a result of medication the same he is on several medications that he had sensation and normal sexual function.  The medications seem to be working quite well for his depression so I told him to keep that in mind that his depression is under control with the medications and he has to weigh the risks and benefits.  He has not tried Cialis or Viagra.  He noticed no difference with testosterone replacement in terms of this condition.  Lowest testosterone that I see was about 232 and it was stopped after it went up to 1342.  Nothing more recent.       Cystoscopy     Date/Time  2/11/2025 8:30 AM     Performed by  Luis Jasso MD   Authorized by  NAIMA Burks     Universal Protocol:  Consent: Verbal consent obtained. Written consent obtained.      Procedure Details:  Procedure type: dilation of urethral stricture    Additional Procedure Details: Cystoscopy Procedure Note        Pre-operative Diagnosis: Urinary hesitancy, nocturia, frequency and pelvic floor dysfunction    Post-operative Diagnosis: Same, with meatal stenosis    Procedure: Flexible cystoscopy with initial urethral dilation with Armond sounds, male    Surgeon: Luis Jasso MD    Anesthesia: 1% Xylocaine per  urethra    EBL: Minimal    Complications: none    Procedure Details   The risks, benefits, complications, treatment options, and expected outcomes were discussed with the patient. The patient concurred with the proposed plan, giving informed consent.    Cystoscopy was performed today under local anesthesia, using sterile technique. The patient was placed in the supine position, prepped with Betadine, and draped in the usual sterile fashion. The flexible cystocope was used to inspect both the urethra and bladder    Findings:  Urethra: Urethra was open but too small for my scope.  I therefore dilated him to 24 Samoan from 12 Samoan with Armond sounds up to the anterior urethra and the urethral meatus..  Otherwise normal without stricture.  Prostate nonocclusive.  Hypertonic sphincter.    Bladder:  Smooth, not trabeculated and there were no stones tumors or other lesions.  The orifices were orthotopic and intact.           Specimens: None                 Complications:  None           Disposition: To home            Condition:  Stable              The following portions of the patient's history were reviewed and updated as appropriate: allergies, current medications, past family history, past medical history, past social history, past surgical history and problem list.  Past Medical History:   Diagnosis Date   • Asthma    • Depressed    • Substance abuse ([facility])      Past Surgical History:   Procedure Laterality Date   • APPENDECTOMY     • DENTAL SURGERY  03/25/2022     shoulder  Review of Systems   Review of Systems   Genitourinary:         As per HPI       Active Problem List     Patient Active Problem List   Diagnosis   • Intervertebral disc disorder with radiculopathy of thoracic region   • Closed wedge compression fracture of T11 vertebra with routine healing   • Class 1 obesity due to excess calories without serious comorbidity with body mass index (BMI) of 30.0 to 30.9 in adult   • Social anxiety disorder   •  "Insomnia due to other mental disorder   • Abnormal blood findings   • Bilateral high scrotal testicles   • Nausea   • Nephrolithiasis   • Bladder wall thickening   • HSV-1 infection   • Ankylosing spondylitis of multiple sites in spine (HCC)   • Continuous opioid dependence (HCC)   • BROWN (dyspnea on exertion)   • Idiopathic scoliosis   • Heartburn   • Primary hypertension       Objective   /92   Pulse 87   Temp 97.7 °F (36.5 °C)   Ht 5' 8\" (1.727 m)   Wt 103 kg (227 lb)   SpO2 96%   BMI 34.52 kg/m²     Physical Exam  Constitutional:       Appearance: Normal appearance. He is normal weight.   HENT:      Head: Normocephalic and atraumatic.   Eyes:      Extraocular Movements: Extraocular movements intact.   Pulmonary:      Effort: Pulmonary effort is normal.   Genitourinary:     Penis: Normal.    Musculoskeletal:         General: No swelling or tenderness. Normal range of motion.      Cervical back: Normal range of motion.   Skin:     General: Skin is warm and dry.      Coloration: Skin is not jaundiced or pale.   Neurological:      General: No focal deficit present.      Mental Status: He is alert and oriented to person, place, and time.   Psychiatric:         Mood and Affect: Mood normal.         Behavior: Behavior normal.         Thought Content: Thought content normal.         Judgment: Judgment normal.             Current Medications     Current Outpatient Medications:   •  cephalexin (KEFLEX) 500 mg capsule, Take 1 capsule (500 mg total) by mouth 1 (one) time for 1 dose Take after procedure, Disp: 1 capsule, Rfl: 0  •  tadalafil (CIALIS) 5 MG tablet, Take 1 tablet (5 mg total) by mouth in the morning, Disp: 30 tablet, Rfl: 11  •  albuterol (Ventolin HFA) 90 mcg/act inhaler, Inhale 2 puffs every 6 (six) hours as needed for wheezing, Disp: 18 g, Rfl: 5  •  Blood Glucose Monitoring Suppl (OneTouch Verio Reflect) w/Device KIT, Check blood sugars once daily. Please substitute with appropriate alternative " as covered by patient's insurance. Dx: E11.65, Disp: 1 kit, Rfl: 0  •  buprenorphine (SUBUTEX) 2 mg, 2 mg daily, Disp: , Rfl:   •  buprenorphine (SUBUTEX) 8 mg, 8 mg 2 (two) times a day, Disp: , Rfl:   •  buPROPion (WELLBUTRIN SR) 150 mg 12 hr tablet, , Disp: , Rfl:   •  escitalopram (LEXAPRO) 10 mg tablet, Take 10 mg by mouth 2 (two) times a day, Disp: , Rfl:   •  Fluticasone-Salmeterol (Advair Diskus) 250-50 mcg/dose inhaler, Inhale 1 puff 2 (two) times a day Rinse mouth after use., Disp: 60 blister, Rfl: 0  •  gabapentin (NEURONTIN) 800 mg tablet, Take 800 mg by mouth 3 (three) times a day as needed, Disp: , Rfl:   •  methocarbamol (ROBAXIN) 500 mg tablet, Take 1 tablet (500 mg total) by mouth 3 (three) times a day as needed for muscle spasms (muscle tension), Disp: 60 tablet, Rfl: 0  •  pantoprazole (PROTONIX) 40 mg tablet, Take 1 tablet (40 mg total) by mouth daily, Disp: 60 tablet, Rfl: 0        Luis Jasso MD

## 2025-06-08 DIAGNOSIS — M62.89 PELVIC FLOOR DYSFUNCTION: ICD-10-CM

## 2025-06-08 DIAGNOSIS — N52.9 ERECTILE DYSFUNCTION, UNSPECIFIED ERECTILE DYSFUNCTION TYPE: ICD-10-CM

## 2025-06-08 DIAGNOSIS — R39.198 DIFFICULTY URINATING: ICD-10-CM

## 2025-06-08 DIAGNOSIS — F52.32 ANORGASMIA OF MALE: ICD-10-CM

## 2025-06-09 RX ORDER — TADALAFIL 5 MG/1
5 TABLET ORAL DAILY
Qty: 30 TABLET | Refills: 5 | Status: SHIPPED | OUTPATIENT
Start: 2025-06-09

## 2025-06-09 RX ORDER — GABAPENTIN 800 MG/1
800 TABLET ORAL 3 TIMES DAILY PRN
Refills: 0 | OUTPATIENT
Start: 2025-06-09

## 2025-06-09 RX ORDER — BUPROPION HYDROCHLORIDE 150 MG/1
TABLET, EXTENDED RELEASE ORAL
Refills: 0 | OUTPATIENT
Start: 2025-06-09

## 2025-06-09 RX ORDER — BUPRENORPHINE 2 MG/1
2 TABLET SUBLINGUAL DAILY
Refills: 0 | OUTPATIENT
Start: 2025-06-09

## 2025-06-09 RX ORDER — ESCITALOPRAM OXALATE 10 MG/1
10 TABLET ORAL 2 TIMES DAILY
Refills: 0 | OUTPATIENT
Start: 2025-06-09

## 2025-06-09 RX ORDER — BUPRENORPHINE 8 MG/1
8 TABLET SUBLINGUAL 2 TIMES DAILY
Refills: 0 | OUTPATIENT
Start: 2025-06-09

## 2025-07-21 ENCOUNTER — HOSPITAL ENCOUNTER (EMERGENCY)
Facility: HOSPITAL | Age: 35
Discharge: HOME | End: 2025-07-21
Attending: EMERGENCY MEDICINE | Admitting: EMERGENCY MEDICINE
Payer: COMMERCIAL

## 2025-07-21 ENCOUNTER — APPOINTMENT (EMERGENCY)
Dept: CT IMAGING | Facility: HOSPITAL | Age: 35
End: 2025-07-21
Payer: COMMERCIAL

## 2025-07-21 VITALS
SYSTOLIC BLOOD PRESSURE: 155 MMHG | BODY MASS INDEX: 34.21 KG/M2 | WEIGHT: 225 LBS | RESPIRATION RATE: 16 BRPM | OXYGEN SATURATION: 95 % | DIASTOLIC BLOOD PRESSURE: 85 MMHG | HEART RATE: 78 BPM | TEMPERATURE: 98.7 F

## 2025-07-21 DIAGNOSIS — R51.9 HEADACHE: ICD-10-CM

## 2025-07-21 DIAGNOSIS — I10 HYPERTENSION: Primary | ICD-10-CM

## 2025-07-21 LAB
2HR DELTA HS TROPONIN: 0 NG/L
ALBUMIN SERPL BCG-MCNC: 4.6 G/DL (ref 3.5–5)
ALP SERPL-CCNC: 48 U/L (ref 34–104)
ALT SERPL W P-5'-P-CCNC: 39 U/L (ref 7–52)
ANION GAP SERPL CALCULATED.3IONS-SCNC: 5 MMOL/L (ref 4–13)
AST SERPL W P-5'-P-CCNC: 26 U/L (ref 13–39)
ATRIAL RATE: 89 BPM
BASOPHILS # BLD AUTO: 0.03 THOUSANDS/ÂΜL (ref 0–0.1)
BASOPHILS NFR BLD AUTO: 1 % (ref 0–1)
BILIRUB SERPL-MCNC: 0.77 MG/DL (ref 0.2–1)
BUN SERPL-MCNC: 8 MG/DL (ref 5–25)
CALCIUM SERPL-MCNC: 9.2 MG/DL (ref 8.4–10.2)
CARDIAC TROPONIN I PNL SERPL HS: 15 NG/L (ref ?–50)
CARDIAC TROPONIN I PNL SERPL HS: 15 NG/L (ref ?–50)
CHLORIDE SERPL-SCNC: 104 MMOL/L (ref 96–108)
CO2 SERPL-SCNC: 30 MMOL/L (ref 21–32)
CREAT SERPL-MCNC: 1.18 MG/DL (ref 0.6–1.3)
EOSINOPHIL # BLD AUTO: 0.12 THOUSAND/ÂΜL (ref 0–0.61)
EOSINOPHIL NFR BLD AUTO: 2 % (ref 0–6)
ERYTHROCYTE [DISTWIDTH] IN BLOOD BY AUTOMATED COUNT: 13.3 % (ref 11.6–15.1)
GFR SERPL CREATININE-BSD FRML MDRD: 78 ML/MIN/1.73SQ M
GLUCOSE SERPL-MCNC: 92 MG/DL (ref 65–140)
HCT VFR BLD AUTO: 55.4 % (ref 36.5–49.3)
HGB BLD-MCNC: 17.3 G/DL (ref 12–17)
IMM GRANULOCYTES # BLD AUTO: 0.01 THOUSAND/UL (ref 0–0.2)
IMM GRANULOCYTES NFR BLD AUTO: 0 % (ref 0–2)
LYMPHOCYTES # BLD AUTO: 2.24 THOUSANDS/ÂΜL (ref 0.6–4.47)
LYMPHOCYTES NFR BLD AUTO: 39 % (ref 14–44)
MCH RBC QN AUTO: 25.8 PG (ref 26.8–34.3)
MCHC RBC AUTO-ENTMCNC: 31.2 G/DL (ref 31.4–37.4)
MCV RBC AUTO: 83 FL (ref 82–98)
MONOCYTES # BLD AUTO: 0.43 THOUSAND/ÂΜL (ref 0.17–1.22)
MONOCYTES NFR BLD AUTO: 8 % (ref 4–12)
NEUTROPHILS # BLD AUTO: 2.89 THOUSANDS/ÂΜL (ref 1.85–7.62)
NEUTS SEG NFR BLD AUTO: 50 % (ref 43–75)
NRBC BLD AUTO-RTO: 0 /100 WBCS
P AXIS: 69 DEGREES
PLATELET # BLD AUTO: 245 THOUSANDS/UL (ref 149–390)
PMV BLD AUTO: 9.3 FL (ref 8.9–12.7)
POTASSIUM SERPL-SCNC: 4 MMOL/L (ref 3.5–5.3)
PR INTERVAL: 128 MS
PROT SERPL-MCNC: 6.8 G/DL (ref 6.4–8.4)
QRS AXIS: 17 DEGREES
QRSD INTERVAL: 90 MS
QT INTERVAL: 330 MS
QTC INTERVAL: 401 MS
RBC # BLD AUTO: 6.7 MILLION/UL (ref 3.88–5.62)
SODIUM SERPL-SCNC: 139 MMOL/L (ref 135–147)
T WAVE AXIS: 26 DEGREES
TSH SERPL DL<=0.05 MIU/L-ACNC: 1.76 UIU/ML (ref 0.45–4.5)
VENTRICULAR RATE: 89 BPM
WBC # BLD AUTO: 5.72 THOUSAND/UL (ref 4.31–10.16)

## 2025-07-21 PROCEDURE — 96366 THER/PROPH/DIAG IV INF ADDON: CPT

## 2025-07-21 PROCEDURE — 99285 EMERGENCY DEPT VISIT HI MDM: CPT | Performed by: EMERGENCY MEDICINE

## 2025-07-21 PROCEDURE — 70496 CT ANGIOGRAPHY HEAD: CPT

## 2025-07-21 PROCEDURE — 84484 ASSAY OF TROPONIN QUANT: CPT

## 2025-07-21 PROCEDURE — 93005 ELECTROCARDIOGRAM TRACING: CPT

## 2025-07-21 PROCEDURE — 96365 THER/PROPH/DIAG IV INF INIT: CPT

## 2025-07-21 PROCEDURE — 36415 COLL VENOUS BLD VENIPUNCTURE: CPT

## 2025-07-21 PROCEDURE — 85025 COMPLETE CBC W/AUTO DIFF WBC: CPT

## 2025-07-21 PROCEDURE — 99284 EMERGENCY DEPT VISIT MOD MDM: CPT

## 2025-07-21 PROCEDURE — 93010 ELECTROCARDIOGRAM REPORT: CPT | Performed by: INTERNAL MEDICINE

## 2025-07-21 PROCEDURE — 70498 CT ANGIOGRAPHY NECK: CPT

## 2025-07-21 PROCEDURE — 84443 ASSAY THYROID STIM HORMONE: CPT

## 2025-07-21 PROCEDURE — 96375 TX/PRO/DX INJ NEW DRUG ADDON: CPT

## 2025-07-21 PROCEDURE — 80053 COMPREHEN METABOLIC PANEL: CPT

## 2025-07-21 RX ORDER — ACETAMINOPHEN 325 MG/1
975 TABLET ORAL ONCE
Status: COMPLETED | OUTPATIENT
Start: 2025-07-21 | End: 2025-07-21

## 2025-07-21 RX ORDER — METOCLOPRAMIDE HYDROCHLORIDE 5 MG/ML
10 INJECTION INTRAMUSCULAR; INTRAVENOUS ONCE
Status: COMPLETED | OUTPATIENT
Start: 2025-07-21 | End: 2025-07-21

## 2025-07-21 RX ORDER — KETOROLAC TROMETHAMINE 30 MG/ML
15 INJECTION, SOLUTION INTRAMUSCULAR; INTRAVENOUS ONCE
Status: COMPLETED | OUTPATIENT
Start: 2025-07-21 | End: 2025-07-21

## 2025-07-21 RX ORDER — MAGNESIUM SULFATE HEPTAHYDRATE 40 MG/ML
2 INJECTION, SOLUTION INTRAVENOUS ONCE
Status: COMPLETED | OUTPATIENT
Start: 2025-07-21 | End: 2025-07-21

## 2025-07-21 RX ADMIN — KETOROLAC TROMETHAMINE 15 MG: 30 INJECTION, SOLUTION INTRAMUSCULAR at 09:54

## 2025-07-21 RX ADMIN — SODIUM CHLORIDE 1000 ML: 0.9 INJECTION, SOLUTION INTRAVENOUS at 08:30

## 2025-07-21 RX ADMIN — IOHEXOL 75 ML: 350 INJECTION, SOLUTION INTRAVENOUS at 08:53

## 2025-07-21 RX ADMIN — METOCLOPRAMIDE 10 MG: 5 INJECTION, SOLUTION INTRAMUSCULAR; INTRAVENOUS at 08:24

## 2025-07-21 RX ADMIN — ACETAMINOPHEN 975 MG: 325 TABLET ORAL at 08:24

## 2025-07-21 RX ADMIN — MAGNESIUM SULFATE HEPTAHYDRATE 2 G: 40 INJECTION, SOLUTION INTRAVENOUS at 08:24

## 2025-07-21 NOTE — DISCHARGE INSTRUCTIONS
Symptoms today likely due to hypertension in the setting of your TRT usage.  It is recommended that you follow-up with your PCP and start on a antihypertensive medication at this time.  If you have any new concerns please come back to the hospital immediately.

## 2025-07-21 NOTE — ED PROVIDER NOTES
ED Disposition       None          Assessment & Plan       Medical Decision Making  Amount and/or Complexity of Data Reviewed  Labs: ordered. Decision-making details documented in ED Course.  Radiology: ordered.    Risk  OTC drugs.  Prescription drug management.      35-year-old male with a past medical history for hypertension not on medication comes in for evaluation after having new onset unsteadiness on his feet headache and weakness.  The patient states that this started yesterday.  He says that his headache is associated with mild nausea.  He does not have any visual abnormalities.  The patient states that he feels like the weakness is worse in his lower extremity.  He denies any recent tick bites.  The patient states that he has never felt like this in the past.  The patient does state that he has been told that he has had numerous cyst on his kidneys in the past but was not ever diagnosed with polycystic kidney disease.  The patient states that he has not had any fever or chills chest pain shortness of breath or abdominal pain.  He denies any recent rashes.  Patient states that he took Aleve without any relief.    On examination, the patient cranial nerves II through XII intact 5 out of 5 strength in all peripheral extremities. Lower extremity ataxia present. Patient has pupils that are equal and reactive.  No nystagmus present.  The patient's heart and lungs clear to auscultation abdomen soft and nontender    Impression hypertension with ataxia and headaches.  Rule out intra cranial vascular abnormalities with CTA head and neck.  EKG, troponin, CBC CMP.    The patient's initial troponin of 15 did not increase at the 2-hour delta troponin.  Patient's symptoms improved with supportive treatment.  Patient recommended to avoid caffeine nicotine and TRT.  Was recommended to follow-up with PCP for hypertension medication to control his vitals.  Return precautions given      ED Course as of 07/21/25 1223   Mon  Jul 21, 2025 0816 EKG: NSR Normal Axis. NO st changes. Normal intervals.   0824 Hemoglobin(!): 17.3  Patient is on TRT   0844 GFR, Calculated: 78   0924 CT Brain: No acute intracranial CT abnormality     CT Angiography: Unremarkable CTA neck and brain.        0936 Spoke to Radiologist about CT with R posterior hypolucency. At this time not concerning for sdh and appears to be dural venous sinus that is right dominant   1111 Delta 2hr hsTnI: 0       Medications   magnesium sulfate 2 g/50 mL IVPB (premix) 2 g (2 g Intravenous New Bag 7/21/25 0824)   sodium chloride 0.9 % bolus 1,000 mL (1,000 mL Intravenous New Bag 7/21/25 0830)   acetaminophen (TYLENOL) tablet 975 mg (975 mg Oral Given 7/21/25 0824)   metoclopramide (REGLAN) injection 10 mg (10 mg Intravenous Given 7/21/25 0824)   iohexol (OMNIPAQUE) 350 MG/ML injection (MULTI-DOSE) 75 mL (75 mL Intravenous Given 7/21/25 0853)       ED Risk Strat Scores         Stroke Assessment       Row Name 07/21/25 0920             NIH Stroke Scale    Interval --      Level of Consciousness (1a.) 0      LOC Questions (1b.) 0      LOC Commands (1c.) 0      Best Gaze (2.) 0      Visual (3.) 0      Facial Palsy (4.) 0      Motor Arm, Left (5a.) 0      Motor Arm, Right (5b.) 0      Motor Leg, Left (6a.) 0      Motor Leg, Right (6b.) 0      Limb Ataxia (7.) 1      Sensory (8.) 0      Best Language (9.) 0      Dysarthria (10.) 0      Extinction and Inattention (11.) (Formerly Neglect) 0      Total 1                              No data recorded                            History of Present Illness       Chief Complaint   Patient presents with    Hypertension    Headache       Past Medical History[1]   Past Surgical History[2]   Family History[3]   Social History[4]   E-Cigarette/Vaping    E-Cigarette Use Never User       E-Cigarette/Vaping Substances    Nicotine No     THC No     CBD No     Flavoring No     Other No     Unknown No       I have reviewed and agree with the history as  documented.     35-year-old male comes in for evaluation of new onset unsteadiness on his feet associated with headache and hypertension        Review of Systems   Constitutional:  Negative for chills and fever.   HENT:  Negative for ear pain and sore throat.    Eyes:  Negative for pain and visual disturbance.   Respiratory:  Negative for cough and shortness of breath.    Cardiovascular:  Negative for chest pain and palpitations.   Gastrointestinal:  Positive for nausea. Negative for abdominal pain and vomiting.   Genitourinary:  Negative for dysuria and hematuria.   Musculoskeletal:  Negative for arthralgias and back pain.   Skin:  Negative for color change and rash.   Neurological:  Positive for dizziness, weakness and headaches. Negative for seizures and syncope.   All other systems reviewed and are negative.          Objective       ED Triage Vitals [07/21/25 0754]   Temperature Pulse Blood Pressure Respirations SpO2 Patient Position - Orthostatic VS   98.8 °F (37.1 °C) 96 (!) 168/109 20 96 % Lying      Temp Source Heart Rate Source BP Location FiO2 (%) Pain Score    Temporal Monitor Left arm -- 4      Vitals      Date and Time Temp Pulse SpO2 Resp BP Pain Score FACES Pain Rating User   07/21/25 0922 -- -- -- -- 161/77 -- -- NR   07/21/25 0815 -- 93 97 % -- 167/104 -- -- JW   07/21/25 0754 98.8 °F (37.1 °C) 96 96 % 20 168/109 4 -- JCS            Physical Exam  Vitals and nursing note reviewed.   Constitutional:       General: He is not in acute distress.     Appearance: He is well-developed.   HENT:      Head: Normocephalic and atraumatic.     Eyes:      Extraocular Movements: Extraocular movements intact.      Conjunctiva/sclera: Conjunctivae normal.      Pupils: Pupils are equal, round, and reactive to light.     Neck:      Vascular: No carotid bruit.     Cardiovascular:      Rate and Rhythm: Normal rate and regular rhythm.      Heart sounds: No murmur heard.  Pulmonary:      Effort: Pulmonary effort is  normal. No respiratory distress.      Breath sounds: Normal breath sounds. No wheezing.   Abdominal:      Palpations: Abdomen is soft.      Tenderness: There is no abdominal tenderness.     Musculoskeletal:         General: No swelling.      Cervical back: Neck supple. No rigidity or tenderness.   Lymphadenopathy:      Cervical: No cervical adenopathy.     Skin:     General: Skin is warm and dry.      Capillary Refill: Capillary refill takes less than 2 seconds.     Neurological:      Mental Status: He is alert and oriented to person, place, and time. Mental status is at baseline.      Cranial Nerves: No cranial nerve deficit.      Motor: No weakness.      Coordination: Coordination normal.      Deep Tendon Reflexes: Reflexes normal.     Psychiatric:         Mood and Affect: Mood normal.         Results Reviewed       Procedure Component Value Units Date/Time    TSH, 3rd generation with Free T4 reflex [[id number]]  (Normal) Collected: 07/21/25 0813    Lab Status: Final result Specimen: Blood from Arm, Right Updated: 07/21/25 0850     TSH 3RD GENERATION 1.763 uIU/mL     HS Troponin I 2hr [[id number]]     Lab Status: No result Specimen: Blood     HS Troponin 0hr (reflex protocol) [[id number]]  (Normal) Collected: 07/21/25 0813    Lab Status: Final result Specimen: Blood from Arm, Right Updated: 07/21/25 0842     hs TnI 0hr 15 ng/L     Comprehensive metabolic panel [[id number]] Collected: 07/21/25 0813    Lab Status: Final result Specimen: Blood from Arm, Right Updated: 07/21/25 0835     Sodium 139 mmol/L      Potassium 4.0 mmol/L      Chloride 104 mmol/L      CO2 30 mmol/L      ANION GAP 5 mmol/L      BUN 8 mg/dL      Creatinine 1.18 mg/dL      Glucose 92 mg/dL      Calcium 9.2 mg/dL      AST 26 U/L      ALT 39 U/L      Alkaline Phosphatase 48 U/L      Total Protein 6.8 g/dL      Albumin 4.6 g/dL      Total Bilirubin 0.77 mg/dL      eGFR 78 ml/min/1.73sq m     Narrative:      National Kid[employer] guidelines  for Chronic Kidney Disease (CKD):     Stage 1 with normal or high GFR (GFR > 90 mL/min/1.73 square meters)    Stage 2 Mild CKD (GFR = 60-89 mL/min/1.73 square meters)    Stage 3A Moderate CKD (GFR = 45-59 mL/min/1.73 square meters)    Stage 3B Moderate CKD (GFR = 30-44 mL/min/1.73 square meters)    Stage 4 Severe CKD (GFR = 15-29 mL/min/1.73 square meters)    Stage 5 End Stage CKD (GFR <15 mL/min/1.73 square meters)  Note: GFR calculation is accurate only with a steady state creatinine    CBC and differential [[id number]]  (Abnormal) Collected: 07/21/25 0813    Lab Status: Final result Specimen: Blood from Arm, Right Updated: 07/21/25 0824     WBC 5.72 Thousand/uL      RBC 6.70 Million/uL      Hemoglobin 17.3 g/dL      Hematocrit 55.4 %      MCV 83 fL      MCH 25.8 pg      MCHC 31.2 g/dL      RDW 13.3 %      MPV 9.3 fL      Platelets 245 Thousands/uL      nRBC 0 /100 WBCs      Segmented % 50 %      Immature Grans % 0 %      Lymphocytes % 39 %      Monocytes % 8 %      Eosinophils Relative 2 %      Basophils Relative 1 %      Absolute Neutrophils 2.89 Thousands/µL      Absolute Immature Grans 0.01 Thousand/uL      Absolute Lymphocytes 2.24 Thousands/µL      Absolute Monocytes 0.43 Thousand/µL      Eosinophils Absolute 0.12 Thousand/µL      Basophils Absolute 0.03 Thousands/µL             CTA head and neck with and without contrast    (Results Pending)       Procedures    ED Medication and Procedure Management   Prior to Admission Medications   Prescriptions Last Dose Informant Patient Reported? Taking?   Blood Glucose Monitoring Suppl (OneTouch Verio Reflect) w/Device KIT  Self No No   Sig: Check blood sugars once daily. Please substitute with appropriate alternative as covered by patient's insurance. Dx: E11.65   Fluticasone-Salmeterol (Advair Diskus) 250-50 mcg/dose inhaler   No No   Sig: Inhale 1 puff 2 (two) times a day Rinse mouth after use.   albuterol (Ventolin HFA) 90 mcg/act inhaler   No No   Sig: Inhale 2  puffs every 6 (six) hours as needed for wheezing   buPROPion (WELLBUTRIN SR) 150 mg 12 hr tablet  Self Yes No   buprenorphine (SUBUTEX) 2 mg  Self Yes No   Si mg daily   buprenorphine (SUBUTEX) 8 mg  Self Yes No   Si mg 2 (two) times a day   escitalopram (LEXAPRO) 10 mg tablet  Self Yes No   Sig: Take 10 mg by mouth 2 (two) times a day   gabapentin (NEURONTIN) 800 mg tablet  Self Yes No   Sig: Take 800 mg by mouth 3 (three) times a day as needed   methocarbamol (ROBAXIN) 500 mg tablet Not Taking Self No No   Sig: Take 1 tablet (500 mg total) by mouth 3 (three) times a day as needed for muscle spasms (muscle tension)   Patient not taking: Reported on 2025   pantoprazole (PROTONIX) 40 mg tablet Not Taking  No No   Sig: Take 1 tablet (40 mg total) by mouth daily   Patient not taking: Reported on 2025   tadalafil (CIALIS) 5 MG tablet   No No   Sig: Take 1 tablet (5 mg total) by mouth in the morning      Facility-Administered Medications: None     Patient's Medications   Discharge Prescriptions    No medications on file     No discharge procedures on file.  ED SEPSIS DOCUMENTATION                [1]   Past Medical History:  Diagnosis Date    Asthma     Depressed     Substance abuse (HCC)    [2]   Past Surgical History:  Procedure Laterality Date    APPENDECTOMY      DENTAL SURGERY  2022   [3]   Family History  Problem Relation Name Age of Onset    No Known Problems Mother      No Known Problems Father     [4]   Social History  Tobacco Use    Smoking status: Former     Current packs/day: 1.00     Average packs/day: 1 pack/day for 10.0 years (10.0 ttl pk-yrs)     Types: Cigarettes    Smokeless tobacco: Never   Vaping Use    Vaping status: Never Used   Substance Use Topics    Alcohol use: Not Currently    Drug use: No        Joaquin Baldwin MD  [phone]

## 2025-07-21 NOTE — ED ATTENDING ATTESTATION
"7/21/2025  I, Devin Desai DO, saw and evaluated the patient. I have discussed the patient with the resident/non-physician practitioner and agree with the resident's/non-physician practitioner's findings, Plan of Care, and MDM as documented in the resident's/non-physician practitioner's note, except where noted. All available labs and Radiology studies were reviewed.  I was present for key portions of any procedure(s) performed by the resident/non-physician practitioner and I was immediately available to provide assistance.       At this point I agree with the current assessment done in the Emergency Department.  I have conducted an independent evaluation of this patient a history and physical is as follows:    Hx of intermittent HTN, not on meds.  Patient presents for evaluation of a headache and high blood pressure. Headache int he front of head, bilateral.  Patient has had similar headaches in the past.  Gradual in nature. Says that he has been having some generalized weakness, nausea and \"unsteadiness on his feet.\"  Patient able to ambulate on his own without difficulty.     Physical Exam  Vitals and nursing note reviewed.   Constitutional:       General: He is not in acute distress.     Appearance: He is well-developed.   HENT:      Head: Normocephalic and atraumatic.      Right Ear: External ear normal.      Left Ear: External ear normal.     Eyes:      Conjunctiva/sclera: Conjunctivae normal.      Pupils: Pupils are equal, round, and reactive to light.       Cardiovascular:      Rate and Rhythm: Normal rate and regular rhythm.      Heart sounds: Normal heart sounds. No murmur heard.     No friction rub. No gallop.   Pulmonary:      Effort: Pulmonary effort is normal. No respiratory distress.      Breath sounds: Normal breath sounds. No wheezing or rales.   Abdominal:      General: Bowel sounds are normal. There is no distension.      Palpations: Abdomen is soft.      Tenderness: There is no abdominal " tenderness. There is no guarding.     Musculoskeletal:         General: No tenderness or deformity. Normal range of motion.      Cervical back: Normal range of motion.   Lymphadenopathy:      Cervical: No cervical adenopathy.     Skin:     General: Skin is warm and dry.     Neurological:      General: No focal deficit present.      Mental Status: He is alert and oriented to person, place, and time. Mental status is at baseline.      Cranial Nerves: No cranial nerve deficit.      Sensory: No sensory deficit.      Motor: No weakness or abnormal muscle tone.     Psychiatric:         Behavior: Behavior normal.        Believe symptoms are likely secondary to undiagnosed high blood pressure.  Will obtain CBC, BMP.  Headache gradual in nature intermittent, similar to previous headaches, did not believe headache is secondary to SAH.  Will give IV fluids.  Given soft history of cystic kidneys will obtain CTA.    Labs within normal limits.  Patient was to follow-up with family doctor in regards to his blood pressure.  Will not start on medication now.  ED Course         Critical Care Time  Procedures

## 2025-07-21 NOTE — ED NOTES
Dr. Monterroso at bedside.     Divya Alvarado, GISELLA  07/21/25 0851     Is This A New Presentation, Or A Follow-Up?: Skin Lesion

## 2025-07-21 NOTE — Clinical Note
Felix Rabago was seen and treated in our emergency department on 7/21/2025.                Diagnosis:     Felix  .    He may return on this date: 07/22/2025         If you have any questions or concerns, please don't hesitate to call.      Joaquin Baldwin MD    ______________________________           _______________          _______________  Hospital Representative                              Date                                Time

## 2025-07-23 ENCOUNTER — OFFICE VISIT (OUTPATIENT)
Dept: FAMILY MEDICINE CLINIC | Facility: CLINIC | Age: 35
End: 2025-07-23
Payer: COMMERCIAL

## 2025-07-23 VITALS
OXYGEN SATURATION: 97 % | WEIGHT: 229 LBS | DIASTOLIC BLOOD PRESSURE: 108 MMHG | SYSTOLIC BLOOD PRESSURE: 162 MMHG | TEMPERATURE: 98 F | HEART RATE: 92 BPM | HEIGHT: 68 IN | BODY MASS INDEX: 34.71 KG/M2

## 2025-07-23 DIAGNOSIS — I10 PRIMARY HYPERTENSION: Primary | ICD-10-CM

## 2025-07-23 PROCEDURE — 99213 OFFICE O/P EST LOW 20 MIN: CPT

## 2025-07-23 RX ORDER — LISINOPRIL 5 MG/1
5 TABLET ORAL DAILY
Qty: 30 TABLET | Refills: 0 | Status: SHIPPED | OUTPATIENT
Start: 2025-07-23

## 2025-07-23 NOTE — PATIENT INSTRUCTIONS
Blood Pressure Log    Name:_______________________________ : ______________________ Goal BP: ______________________    Sitting   Date AM PM Pulse Notes (Med changes/Feeling Unwell)                                                                                                                                                 Please monitor and record your blood pressure readings for seven days in a row and bring to your appointment  Take one reading, both sitting and standing in the morning, 30 minutes to one hour before medications  Take one reading, both sitting and standing, between 5-10 PM before medications   When you take your blood pressure sit comfortably and quietly for 3-5 minutes, at a table with your arm resting at heart level, both feet on the floor and back supported and with an empty bladder   Don't cross your legs, drink caffeinated beverages or smoke within 30 minutes of taking your blood pressure  Make sure the cuff is properly positioned on your arm  Make sure the cuff is the appropriate size  Have your blood pressure monitor checked at the doctor's office at least once a year        Name:_____________________________ : ______________________ Goal BP: ______________________    Standing  Date AM PM Pulse Notes (Med changes/Feeling Unwell)                                                                                                                                                  Please monitor and record your blood pressure readings for seven days in a row and bring to your appointment  Take one reading, both sitting and standing in the morning, 30 minutes to one hour before medications  Take one reading, both sitting and standing, between 5-10 PM before medications   When you take your blood pressure sit comfortably and quietly for 3-5 minutes, at a table with your arm resting at heart level, both feet on the floor and back supported and with an empty bladder   Don't cross your  legs, drink caffeinated beverages or smoke within 30 minutes of taking your blood pressure  Make sure the cuff is properly positioned on your arm  Make sure the cuff is the appropriate size  Have your blood pressure monitor checked at the doctor's office at least once a year

## 2025-07-23 NOTE — ASSESSMENT & PLAN NOTE
Seen in ER 7/21/25 for symptomatic HTN  CTA unremarkable; ECG stable; labs WNL    Hx of periodically elevated BPs however overall controlled per documentation    Reports BP has been 180s/80s at work  States he felt dizzy last week and needed to stop working    Denies significant stress or environmental changes  Denies recent illness    Denies CP or SOB; denies palpitations, numbness, weakness, speech change, visual change    At this time, recommend initiating antihypertensive given BPs are staying consistently elevated & patient is symptomatic    Counseled on ACEi  1 week BP recheck  Check labs in 1 week  F/u as scheduled in 2 weeks    Call our office with any side effects, highs or low - patient verbalized understanding of this  ER precautions provided    Patient also reminded to obtain sleep study that was previously recommended to him by PCP - verbalized understanding    The benefits, risks and alternatives to the treatment plan were discussed at this visit. Patient was advised of common adverse effects of any medical therapies prescribed. All questions were answered and discussed with the patient and any accompanying family members or caretakers.     Orders:    lisinopril (ZESTRIL) 5 mg tablet; Take 1 tablet (5 mg total) by mouth daily    Basic metabolic panel; Future

## 2025-07-23 NOTE — PROGRESS NOTES
Name: Felix Rabago      : 1990      MRN: [id number]  Encounter Provider: NAIMA Callahan  Encounter Date: 2025   Encounter department: [facility]  :  Assessment & Plan  Primary hypertension    Seen in ER 25 for symptomatic HTN  CTA unremarkable; ECG stable; labs WNL    Hx of periodically elevated BPs however overall controlled per documentation    Reports BP has been 180s/80s at work  States he felt dizzy last week and needed to stop working    Denies significant stress or environmental changes  Denies recent illness    Denies CP or SOB; denies palpitations, numbness, weakness, speech change, visual change    At this time, recommend initiating antihypertensive given BPs are staying consistently elevated & patient is symptomatic    Counseled on ACEi  1 week BP recheck  Check labs in 1 week  F/u as scheduled in 2 weeks    Call our office with any side effects, highs or low - patient verbalized understanding of this  ER precautions provided    Patient also reminded to obtain sleep study that was previously recommended to him by PCP - verbalized understanding    The benefits, risks and alternatives to the treatment plan were discussed at this visit. Patient was advised of common adverse effects of any medical therapies prescribed. All questions were answered and discussed with the patient and any accompanying family members or caretakers.     Orders:    lisinopril (ZESTRIL) 5 mg tablet; Take 1 tablet (5 mg total) by mouth daily    Basic metabolic panel; Future          Depression Screening and Follow-up Plan: Patient was screened for depression during today's encounter. They screened negative with a PHQ-2 score of 0.        History of Present Illness     Review of Systems   Constitutional:  Negative for chills, fatigue and fever.   HENT:  Negative for congestion, ear discharge, ear pain, facial swelling, rhinorrhea, sinus pressure, sinus pain, sore throat and  "trouble swallowing.    Eyes:  Negative for photophobia, pain and visual disturbance.   Respiratory:  Negative for cough, chest tightness, shortness of breath and wheezing.    Cardiovascular:  Negative for chest pain, palpitations and leg swelling.   Gastrointestinal:  Negative for abdominal pain, diarrhea, nausea and vomiting.   Genitourinary:  Negative for dysuria, flank pain and hematuria.   Musculoskeletal:  Negative for arthralgias, back pain, myalgias and neck pain.   Skin:  Negative for pallor and wound.   Neurological:  Negative for dizziness, syncope, weakness, numbness and headaches.   Psychiatric/Behavioral:  Negative for confusion and sleep disturbance.    All other systems reviewed and are negative.      Objective   BP (!) 162/108   Pulse 92   Temp 98 °F (36.7 °C)   Ht 5' 8\" (1.727 m)   Wt 104 kg (229 lb)   SpO2 97%   BMI 34.82 kg/m²      Physical Exam  Vitals and nursing note reviewed.   Constitutional:       General: He is not in acute distress.     Appearance: Normal appearance. He is well-developed. He is not ill-appearing.   HENT:      Head: Normocephalic and atraumatic.      Jaw: There is normal jaw occlusion.      Right Ear: Tympanic membrane normal.      Left Ear: Tympanic membrane normal.      Nose: Nose normal.      Mouth/Throat:      Pharynx: Oropharynx is clear. No oropharyngeal exudate or posterior oropharyngeal erythema.     Eyes:      Extraocular Movements: Extraocular movements intact.      Conjunctiva/sclera: Conjunctivae normal.     Neck:      Thyroid: No thyroid mass.      Vascular: No carotid bruit or JVD.      Trachea: Trachea and phonation normal.     Cardiovascular:      Rate and Rhythm: Normal rate and regular rhythm.      Heart sounds: S1 normal and S2 normal. No murmur heard.  Pulmonary:      Effort: Pulmonary effort is normal. No tachypnea or respiratory distress.      Breath sounds: Normal breath sounds and air entry. No stridor. No decreased breath sounds.   Chest:      " Chest wall: No tenderness.   Abdominal:      General: Bowel sounds are normal. There is no distension.      Palpations: Abdomen is soft.      Tenderness: There is no abdominal tenderness. There is no right CVA tenderness or left CVA tenderness.     Musculoskeletal:         General: No swelling.      Cervical back: Normal range of motion and neck supple.      Right lower leg: No edema.      Left lower leg: No edema.   Lymphadenopathy:      Cervical: No cervical adenopathy.     Skin:     General: Skin is warm and dry.      Capillary Refill: Capillary refill takes less than 2 seconds.      Findings: No rash.     Neurological:      General: No focal deficit present.      Mental Status: He is alert and oriented to person, place, and time.      Cranial Nerves: Cranial nerves 2-12 are intact.      Sensory: Sensation is intact.      Motor: Motor function is intact.      Coordination: Coordination is intact.      Gait: Gait is intact.     Psychiatric:         Mood and Affect: Mood normal.         Behavior: Behavior is cooperative.

## 2025-08-21 ENCOUNTER — PATIENT MESSAGE (OUTPATIENT)
Age: 35
End: 2025-08-21

## 2025-08-21 DIAGNOSIS — R39.198 DIFFICULTY URINATING: ICD-10-CM

## 2025-08-21 DIAGNOSIS — F52.32 ANORGASMIA OF MALE: ICD-10-CM

## 2025-08-21 DIAGNOSIS — M62.89 PELVIC FLOOR DYSFUNCTION: ICD-10-CM

## 2025-08-21 DIAGNOSIS — I10 PRIMARY HYPERTENSION: ICD-10-CM

## 2025-08-21 DIAGNOSIS — N52.9 ERECTILE DYSFUNCTION, UNSPECIFIED ERECTILE DYSFUNCTION TYPE: ICD-10-CM

## 2025-08-21 RX ORDER — LISINOPRIL 5 MG/1
5 TABLET ORAL DAILY
Qty: 30 TABLET | Refills: 5 | Status: SHIPPED | OUTPATIENT
Start: 2025-08-21

## 2025-08-21 RX ORDER — TADALAFIL 5 MG/1
5 TABLET ORAL DAILY
Qty: 30 TABLET | Refills: 0 | OUTPATIENT
Start: 2025-08-21